# Patient Record
Sex: MALE | Race: BLACK OR AFRICAN AMERICAN | NOT HISPANIC OR LATINO | Employment: OTHER | URBAN - METROPOLITAN AREA
[De-identification: names, ages, dates, MRNs, and addresses within clinical notes are randomized per-mention and may not be internally consistent; named-entity substitution may affect disease eponyms.]

---

## 2017-08-22 ENCOUNTER — LAB VISIT (OUTPATIENT)
Dept: LAB | Facility: HOSPITAL | Age: 52
End: 2017-08-22
Attending: INTERNAL MEDICINE
Payer: MEDICARE

## 2017-08-22 ENCOUNTER — OFFICE VISIT (OUTPATIENT)
Dept: NEPHROLOGY | Facility: CLINIC | Age: 52
End: 2017-08-22
Payer: MEDICARE

## 2017-08-22 VITALS
WEIGHT: 315 LBS | SYSTOLIC BLOOD PRESSURE: 158 MMHG | DIASTOLIC BLOOD PRESSURE: 110 MMHG | HEIGHT: 74 IN | BODY MASS INDEX: 40.43 KG/M2 | OXYGEN SATURATION: 98 % | HEART RATE: 79 BPM

## 2017-08-22 DIAGNOSIS — I15.9 SECONDARY HYPERTENSION: ICD-10-CM

## 2017-08-22 DIAGNOSIS — N18.30 CHRONIC KIDNEY DISEASE, STAGE III (MODERATE): Primary | ICD-10-CM

## 2017-08-22 DIAGNOSIS — N19 RENAL FAILURE, UNSPECIFIED CHRONICITY: ICD-10-CM

## 2017-08-22 DIAGNOSIS — R73.09 BLOOD GLUCOSE ABNORMAL: ICD-10-CM

## 2017-08-22 DIAGNOSIS — I10 HTN (HYPERTENSION), MALIGNANT: ICD-10-CM

## 2017-08-22 DIAGNOSIS — N18.30 CHRONIC KIDNEY DISEASE, STAGE III (MODERATE): ICD-10-CM

## 2017-08-22 DIAGNOSIS — Z13.1 SCREENING FOR DIABETES MELLITUS: ICD-10-CM

## 2017-08-22 LAB
25(OH)D3+25(OH)D2 SERPL-MCNC: 35 NG/ML
ALBUMIN SERPL BCP-MCNC: 3.5 G/DL
ALP SERPL-CCNC: 67 U/L
ALT SERPL W/O P-5'-P-CCNC: 23 U/L
ANION GAP SERPL CALC-SCNC: 8 MMOL/L
AST SERPL-CCNC: 26 U/L
BASOPHILS # BLD AUTO: 0.02 K/UL
BASOPHILS NFR BLD: 0.3 %
BILIRUB SERPL-MCNC: 0.4 MG/DL
BUN SERPL-MCNC: 26 MG/DL
CALCIUM SERPL-MCNC: 9.6 MG/DL
CHLORIDE SERPL-SCNC: 103 MMOL/L
CO2 SERPL-SCNC: 33 MMOL/L
CREAT SERPL-MCNC: 2.4 MG/DL
DIFFERENTIAL METHOD: ABNORMAL
EOSINOPHIL # BLD AUTO: 0.4 K/UL
EOSINOPHIL NFR BLD: 5.4 %
ERYTHROCYTE [DISTWIDTH] IN BLOOD BY AUTOMATED COUNT: 13.2 %
EST. GFR  (AFRICAN AMERICAN): 35.3 ML/MIN/1.73 M^2
EST. GFR  (NON AFRICAN AMERICAN): 30.5 ML/MIN/1.73 M^2
FERRITIN SERPL-MCNC: 249 NG/ML
GLUCOSE SERPL-MCNC: 107 MG/DL
HCT VFR BLD AUTO: 41.9 %
HGB BLD-MCNC: 14.3 G/DL
IRON SERPL-MCNC: 79 UG/DL
LYMPHOCYTES # BLD AUTO: 3.4 K/UL
LYMPHOCYTES NFR BLD: 50.3 %
MAGNESIUM SERPL-MCNC: 1.9 MG/DL
MCH RBC QN AUTO: 32.9 PG
MCHC RBC AUTO-ENTMCNC: 34.1 G/DL
MCV RBC AUTO: 97 FL
MONOCYTES # BLD AUTO: 0.6 K/UL
MONOCYTES NFR BLD: 9 %
NEUTROPHILS # BLD AUTO: 2.3 K/UL
NEUTROPHILS NFR BLD: 34.9 %
PHOSPHATE SERPL-MCNC: 3.1 MG/DL
PLATELET # BLD AUTO: 218 K/UL
PMV BLD AUTO: 10.6 FL
POTASSIUM SERPL-SCNC: 3.8 MMOL/L
PROT SERPL-MCNC: 7.4 G/DL
PTH-INTACT SERPL-MCNC: 78 PG/ML
RBC # BLD AUTO: 4.34 M/UL
SATURATED IRON: 21 %
SODIUM SERPL-SCNC: 144 MMOL/L
TOTAL IRON BINDING CAPACITY: 380 UG/DL
TRANSFERRIN SERPL-MCNC: 257 MG/DL
URATE SERPL-MCNC: 7.7 MG/DL
WBC # BLD AUTO: 6.7 K/UL

## 2017-08-22 PROCEDURE — 82306 VITAMIN D 25 HYDROXY: CPT

## 2017-08-22 PROCEDURE — 84550 ASSAY OF BLOOD/URIC ACID: CPT

## 2017-08-22 PROCEDURE — 83735 ASSAY OF MAGNESIUM: CPT

## 2017-08-22 PROCEDURE — 80053 COMPREHEN METABOLIC PANEL: CPT

## 2017-08-22 PROCEDURE — 85025 COMPLETE CBC W/AUTO DIFF WBC: CPT

## 2017-08-22 PROCEDURE — 83540 ASSAY OF IRON: CPT

## 2017-08-22 PROCEDURE — 36415 COLL VENOUS BLD VENIPUNCTURE: CPT

## 2017-08-22 PROCEDURE — 82728 ASSAY OF FERRITIN: CPT

## 2017-08-22 PROCEDURE — 83970 ASSAY OF PARATHORMONE: CPT

## 2017-08-22 PROCEDURE — 82088 ASSAY OF ALDOSTERONE: CPT

## 2017-08-22 PROCEDURE — 99999 PR PBB SHADOW E&M-NEW PATIENT-LVL III: CPT | Mod: PBBFAC,,, | Performed by: INTERNAL MEDICINE

## 2017-08-22 PROCEDURE — 84466 ASSAY OF TRANSFERRIN: CPT

## 2017-08-22 PROCEDURE — 99204 OFFICE O/P NEW MOD 45 MIN: CPT | Mod: S$PBB,,, | Performed by: INTERNAL MEDICINE

## 2017-08-22 PROCEDURE — 83036 HEMOGLOBIN GLYCOSYLATED A1C: CPT

## 2017-08-22 PROCEDURE — 84100 ASSAY OF PHOSPHORUS: CPT

## 2017-08-22 RX ORDER — HYDRALAZINE HYDROCHLORIDE 50 MG/1
50 TABLET, FILM COATED ORAL 3 TIMES DAILY
COMMUNITY
End: 2018-02-19 | Stop reason: SDUPTHER

## 2017-08-22 RX ORDER — HYDROCHLOROTHIAZIDE 25 MG/1
25 TABLET ORAL DAILY
COMMUNITY
End: 2017-10-18

## 2017-08-22 RX ORDER — AMLODIPINE BESYLATE 10 MG/1
10 TABLET ORAL DAILY
COMMUNITY
End: 2017-09-12 | Stop reason: SDUPTHER

## 2017-08-22 RX ORDER — OLMESARTAN MEDOXOMIL 40 MG/1
40 TABLET ORAL DAILY
COMMUNITY
End: 2017-10-16 | Stop reason: SDUPTHER

## 2017-08-22 NOTE — PROGRESS NOTES
Subjective:       Patient ID: Fausto Pandey is a 51 y.o. Black or  male who presents for new evaluation of CKD    HPI   52 y/o M with HTN who presents for new evaluation of CKD. No labs to review, but per patient, he went to ED last week c/o left leg pain and workup revealed a GFR of 33.  He does not exercise much. He denies any chest pain, shortness of breath on mild to moderate activity. His appetite is good and denies any nausea, vomiting, diarrhea, abdominal pain, melena or bright red bleeding per rectum. His bowel movements are regular and his weight is stable. He urinates regularly and denies any frequency, urgency or hematuria. He does not check his BP.       Past Medical History:   Diagnosis Date    Hypertension        Review of Systems    Constitutional: Negative for fatigue.   Eyes: Negative for discharge.   Respiratory: Negative for cough, shortness of breath and wheezing.   Cardiovascular: Negative for chest pain and palpitations.   Gastrointestinal: Negative for nausea, vomiting, abdominal pain and diarrhea.   Genitourinary: Negative for dysuria, urgency, frequency and hematuria.   Skin: Negative for color change and rash.   Psychiatric/Behavioral: Negative for confusion.    Objective:      Physical Exam    Gen: AAOx3, NAD  HEENT: mmm  Neck: no bruit, no JVD  CV: RRR, no m/r  Resp: CTAx2, normal effort  GI: soft, ND, NTTP, +BS  Extr: no LE edema  Neuro: normal reflexes, no focal deficits    Assessment:       1. Chronic kidney disease, stage III (moderate)    2. Secondary hypertension    3. Renal failure, unspecified chronicity    4. Screening for diabetes mellitus    5. Blood glucose abnormal    6. HTN (hypertension), malignant        Plan:         1. CKD: per patient, GFR 33 on labs done in the ER (not available to review). Will get labs, renal US with doppler.     No results found for: CREATININE  Protein Creatinine Ratios: check UPC    No results found for: UTPCR    Acid-Base: No  results found for: NA, K, CO2 checking labs    2. HTN: Blood pressures not controlled on current meds. Will check renin, dylon, renal US with doppler. Increased hydralazine to 50mg TID    3. Renal osteodystrophy: last PTH No results found for: PTH, CALCIUM, CAION, PHOS, will check PTH, vit D    4. Anemia: will check cbc    Lab Results   Component Value Date    HGB 14.3 08/22/2017        5. DM:  Last HbA1C No results found for: HGBA1C    6. Lipid management: will check lipid panel  No results found for: LDLCALC      Follow up in 2 weeks

## 2017-08-23 LAB
ESTIMATED AVG GLUCOSE: 91 MG/DL
HBA1C MFR BLD HPLC: 4.8 %

## 2017-08-24 LAB — ALDOST SERPL-MCNC: 11.6 NG/DL

## 2017-08-25 ENCOUNTER — HOSPITAL ENCOUNTER (OUTPATIENT)
Dept: RADIOLOGY | Facility: HOSPITAL | Age: 52
Discharge: HOME | End: 2017-08-25
Attending: INTERNAL MEDICINE
Payer: MEDICARE

## 2017-08-25 DIAGNOSIS — N19 RENAL FAILURE, UNSPECIFIED CHRONICITY: ICD-10-CM

## 2017-08-25 DIAGNOSIS — N18.30 CHRONIC KIDNEY DISEASE, STAGE III (MODERATE): ICD-10-CM

## 2017-08-25 DIAGNOSIS — I15.9 SECONDARY HYPERTENSION: ICD-10-CM

## 2017-08-25 LAB — RENIN PLAS-CCNC: 3.9 NG/ML/H

## 2017-08-25 PROCEDURE — 76770 US EXAM ABDO BACK WALL COMP: CPT | Mod: 26,59,GC, | Performed by: RADIOLOGY

## 2017-08-25 PROCEDURE — 76770 US EXAM ABDO BACK WALL COMP: CPT | Mod: TC

## 2017-08-25 PROCEDURE — 93975 VASCULAR STUDY: CPT | Mod: 26,GC,, | Performed by: RADIOLOGY

## 2017-08-29 ENCOUNTER — HOSPITAL ENCOUNTER (OUTPATIENT)
Dept: RADIOLOGY | Facility: HOSPITAL | Age: 52
Discharge: HOME | End: 2017-08-29
Attending: NURSE PRACTITIONER
Payer: MEDICARE

## 2017-08-29 ENCOUNTER — TELEPHONE (OUTPATIENT)
Dept: ORTHOPEDICS | Facility: CLINIC | Age: 52
End: 2017-08-29

## 2017-08-29 ENCOUNTER — OFFICE VISIT (OUTPATIENT)
Dept: ORTHOPEDICS | Facility: CLINIC | Age: 52
End: 2017-08-29
Payer: MEDICARE

## 2017-08-29 VITALS — BODY MASS INDEX: 40.43 KG/M2 | WEIGHT: 315 LBS | HEIGHT: 74 IN

## 2017-08-29 DIAGNOSIS — M25.562 ACUTE PAIN OF LEFT KNEE: Primary | ICD-10-CM

## 2017-08-29 DIAGNOSIS — M25.562 ACUTE BILATERAL KNEE PAIN: ICD-10-CM

## 2017-08-29 DIAGNOSIS — M25.561 ACUTE BILATERAL KNEE PAIN: ICD-10-CM

## 2017-08-29 DIAGNOSIS — M25.562 ACUTE BILATERAL KNEE PAIN: Primary | ICD-10-CM

## 2017-08-29 DIAGNOSIS — M25.561 ACUTE BILATERAL KNEE PAIN: Primary | ICD-10-CM

## 2017-08-29 PROCEDURE — 99203 OFFICE O/P NEW LOW 30 MIN: CPT | Mod: 25,S$PBB,, | Performed by: NURSE PRACTITIONER

## 2017-08-29 PROCEDURE — 73564 X-RAY EXAM KNEE 4 OR MORE: CPT | Mod: 26,50,, | Performed by: RADIOLOGY

## 2017-08-29 PROCEDURE — 99213 OFFICE O/P EST LOW 20 MIN: CPT | Mod: PBBFAC,25,PO | Performed by: NURSE PRACTITIONER

## 2017-08-29 PROCEDURE — 99999 PR PBB SHADOW E&M-EST. PATIENT-LVL III: CPT | Mod: PBBFAC,,, | Performed by: NURSE PRACTITIONER

## 2017-08-29 PROCEDURE — 73564 X-RAY EXAM KNEE 4 OR MORE: CPT | Mod: TC,50

## 2017-08-29 PROCEDURE — 20610 DRAIN/INJ JOINT/BURSA W/O US: CPT | Mod: S$PBB,LT,, | Performed by: NURSE PRACTITIONER

## 2017-08-29 PROCEDURE — 20610 DRAIN/INJ JOINT/BURSA W/O US: CPT | Mod: PBBFAC,PO | Performed by: NURSE PRACTITIONER

## 2017-08-29 RX ORDER — TRIAMCINOLONE ACETONIDE 40 MG/ML
40 INJECTION, SUSPENSION INTRA-ARTICULAR; INTRAMUSCULAR
Status: COMPLETED | OUTPATIENT
Start: 2017-08-29 | End: 2017-08-29

## 2017-08-29 RX ADMIN — TRIAMCINOLONE ACETONIDE 40 MG: 40 INJECTION, SUSPENSION INTRA-ARTICULAR; INTRAMUSCULAR at 07:08

## 2017-08-30 NOTE — PROGRESS NOTES
CC: Pain and Swelling of the Left Knee      HPI: Pt with left knee pain for the past month. The pain started spontaneously and is aching. It is global with radiation to the lateral thigh. He had some associated muscle spasms, but those have resolved with a muscle relaxer. He is unable to take nsaids due to creatinine 2.4. He is currently being worked up by nephrology. 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 There is no catching or locking. He is ambulating independently. There is not a limp.    ROS  General: denies fever and chills  Resp: no c/o sob  CVS: no c/o cp  MSK: c/o left knee pain and swelling worse with activity    PE  General: AAOx3, pleasant and cooperative  Resp: respirations even and unlabored  MSK: left knee exam  0 degrees extension  120 degrees flexion  No warmth or erythema   - effusion    Xray:  Ordered and reviewed by me: joint spaces well preserved with no fracture or dislocation noted    Assessment:  Left knee pain    Plan:  Pain described consistent with arthritis, but xray is normal. Due to patients age and spontaneous onset of pain, will obtain an MRI for further evaluation  Cortisone injection left knee today for pain  RICE  F/u results of MRI by phone    Knee Injection Procedure Note    Pre-operative Diagnosis: left knee paint    Post-operative Diagnosis: same    Indications: left knee pain    Anesthesia: none    Procedure Details     Verbal consent was obtained for the procedure. The injection site was identified and the skin was prepared with alcohol. The left knee was injected from an anterolateral approach with 1 ml of Kenalog and 5 ml Lidocaine under sterile technique using a 22 gauge needle. The needle was removed and the area cleansed and dressed.    Complications:  None; patient tolerated the procedure well.    he was advised to rest the knee today, using ice and elevation as needed for comfort and swelling. he did  receive immediate relief of the knee pain. he was told this would be short lived and is secondary to the lidocaine. he may have an increase in discomfort tonight followed by steady improvement over the next several days. It may take 1-3 weeks following the injection to get the full benefit of the medication.

## 2017-08-31 ENCOUNTER — PATIENT MESSAGE (OUTPATIENT)
Dept: NEPHROLOGY | Facility: CLINIC | Age: 52
End: 2017-08-31

## 2017-09-08 ENCOUNTER — HOSPITAL ENCOUNTER (EMERGENCY)
Facility: OTHER | Age: 52
Discharge: HOME | End: 2017-09-08
Attending: EMERGENCY MEDICINE | Admitting: EMERGENCY MEDICINE
Payer: MEDICARE

## 2017-09-08 VITALS
TEMPERATURE: 98 F | WEIGHT: 315 LBS | OXYGEN SATURATION: 98 % | HEART RATE: 75 BPM | DIASTOLIC BLOOD PRESSURE: 111 MMHG | RESPIRATION RATE: 16 BRPM | BODY MASS INDEX: 40.43 KG/M2 | SYSTOLIC BLOOD PRESSURE: 195 MMHG | HEIGHT: 74 IN

## 2017-09-08 DIAGNOSIS — I10 HYPERTENSION, UNCONTROLLED: ICD-10-CM

## 2017-09-08 DIAGNOSIS — M25.562 ACUTE PAIN OF LEFT KNEE: Primary | ICD-10-CM

## 2017-09-08 PROCEDURE — 25000003 PHARM REV CODE 250: Performed by: PHYSICIAN ASSISTANT

## 2017-09-08 PROCEDURE — 99283 EMERGENCY DEPT VISIT LOW MDM: CPT

## 2017-09-08 RX ORDER — OLMESARTAN MEDOXOMIL AND HYDROCHLOROTHIAZIDE 40/25 40; 25 MG/1; MG/1
1 TABLET ORAL DAILY
Qty: 30 TABLET | Refills: 0 | Status: SHIPPED | OUTPATIENT
Start: 2017-09-08 | End: 2017-09-12 | Stop reason: SDUPTHER

## 2017-09-08 RX ORDER — HYDROCODONE BITARTRATE AND ACETAMINOPHEN 7.5; 325 MG/1; MG/1
1 TABLET ORAL EVERY 4 HOURS PRN
Qty: 5 TABLET | Refills: 0 | Status: SHIPPED | OUTPATIENT
Start: 2017-09-08 | End: 2017-09-08

## 2017-09-08 RX ORDER — HYDROCODONE BITARTRATE AND ACETAMINOPHEN 5; 325 MG/1; MG/1
1 TABLET ORAL
Status: COMPLETED | OUTPATIENT
Start: 2017-09-08 | End: 2017-09-08

## 2017-09-08 RX ORDER — KETOROLAC TROMETHAMINE 30 MG/ML
30 INJECTION, SOLUTION INTRAMUSCULAR; INTRAVENOUS
Status: DISCONTINUED | OUTPATIENT
Start: 2017-09-08 | End: 2017-09-08

## 2017-09-08 RX ORDER — HYDROCODONE BITARTRATE AND ACETAMINOPHEN 7.5; 325 MG/1; MG/1
1 TABLET ORAL EVERY 4 HOURS PRN
Qty: 10 TABLET | Refills: 0 | Status: SHIPPED | OUTPATIENT
Start: 2017-09-08 | End: 2017-09-13 | Stop reason: SDUPTHER

## 2017-09-08 RX ORDER — METHOCARBAMOL 500 MG/1
1000 TABLET, FILM COATED ORAL
Status: COMPLETED | OUTPATIENT
Start: 2017-09-08 | End: 2017-09-08

## 2017-09-08 RX ORDER — OLMESARTAN MEDOXOMIL AND HYDROCHLOROTHIAZIDE 40/25 40; 25 MG/1; MG/1
1 TABLET ORAL DAILY
Qty: 30 TABLET | Refills: 0 | Status: SHIPPED | OUTPATIENT
Start: 2017-09-08 | End: 2017-09-08

## 2017-09-08 RX ADMIN — HYDROCODONE BITARTRATE AND ACETAMINOPHEN 1 TABLET: 5; 325 TABLET ORAL at 10:09

## 2017-09-08 RX ADMIN — METHOCARBAMOL 1000 MG: 500 TABLET ORAL at 10:09

## 2017-09-09 NOTE — ED PROVIDER NOTES
Encounter Date: 9/8/2017       History     Chief Complaint   Patient presents with    Knee Pain     Patient stated that he received a steroid shot to his left knee 2 weeks ago and has been having pain ever since.       Patient is a 50 y/o male with hypertension who presents to the ED with right knee pain. He states this pain began after he received a steroid shot to the right knee on Aug 29. He states he received the shot for his arthritis but denies experiencing any knee pain prior to the injection. He did not get back in contact with the Orthopedist to let her know he is experiencing pain. Upon reviewing these medical records, she did not feel comfortable giving him NSAIDs due to elevated creatinine.    The history is provided by the patient.     Review of patient's allergies indicates:  No Known Allergies  Past Medical History:   Diagnosis Date    Hypertension      History reviewed. No pertinent surgical history.  History reviewed. No pertinent family history.  Social History   Substance Use Topics    Smoking status: Current Every Day Smoker     Packs/day: 0.50     Types: Cigarettes    Smokeless tobacco: Never Used    Alcohol use Yes     Review of Systems   Constitutional: Negative for chills and fever.   HENT: Negative for congestion and sore throat.    Eyes: Negative for pain.   Respiratory: Negative for shortness of breath.    Cardiovascular: Negative for chest pain.   Gastrointestinal: Negative for abdominal pain, diarrhea, nausea and vomiting.   Genitourinary: Negative for dysuria.   Musculoskeletal: Positive for arthralgias and gait problem. Negative for back pain and joint swelling.   Skin: Negative for rash.   Neurological: Negative for headaches.       Physical Exam     Initial Vitals [09/08/17 2143]   BP Pulse Resp Temp SpO2   (!) 217/110 85 16 98.2 °F (36.8 °C) 97 %      MAP       145.67         Vitals:    09/08/17 2143 09/08/17 2256   BP: (!) 217/110 (!) 195/111   Pulse: 85 75   Resp: 16 16  "  Temp: 98.2 °F (36.8 °C)    TempSrc: Oral    SpO2: 97% 98%   Weight: (!) 144.7 kg (319 lb)    Height: 6' 2" (1.88 m)        Physical Exam    Constitutional: He is cooperative.   Obese male in no acute distress accompanied in the ED with his wife. In no acute distress   HENT:   Head: Normocephalic and atraumatic.   Eyes: EOM are normal. Pupils are equal, round, and reactive to light.   Neck: Normal range of motion. Neck supple.   Cardiovascular: Normal rate, regular rhythm and intact distal pulses.   Pulmonary/Chest: Breath sounds normal. He has no wheezes.   Abdominal: Soft. Bowel sounds are normal. There is no tenderness.   Musculoskeletal: Normal range of motion. He exhibits no edema.   No patellar laxity, or effusion. Negative valgus/varus stress, negative anterior and posterior drawer. Pain to anterolateral aspect of left knee with extension of the leg.    Neurological: He is alert. No cranial nerve deficit. GCS eye subscore is 4. GCS verbal subscore is 5. GCS motor subscore is 6.   Skin: Skin is warm and dry. No rash noted.   Psychiatric: He has a normal mood and affect. His behavior is normal.         ED Course   Procedures  Labs Reviewed - No data to display          Medical Decision Making:   Initial Assessment:   Urgent evaluation of a 51 y.o. male with a medical history of HTN, presenting to the emergency department complaining of knee pain. Patient is afebrile, nontoxic appearing and hemodynamically stable.  ED Management:  Given patients symptoms, physical exam, and recent imaging, I do not believe any imaging is warranted at this time. Will give pain meds and recheck blood pressure. Patient's pain decreased on Norco and BP dropped to 195/111. Patient is asymptomatic and there is no evidence of hypertensive urgency/emergency. Will refill Benicar and provide short course of pain meds.   Patient understands he needs to follow up with orthopedist  I have discussed the patient with the attending thoroughly " and he/she agrees to the treatment and plan  Other:   I have discussed this case with another health care provider.       <> Summary of the Discussion: Dr. Coreas              Attending Attestation:     Physician Attestation Statement for NP/PA:   I have conducted a face to face encounter with this patient in addition to the NP/PA, due to NP/PA Request    Other NP/PA Attestation Additions:    History of Present Illness: 51-year-old male presents with complaint of left knee pain since receiving steroid injection a few weeks ago.  There is been no new trauma.   Physical Exam: Left knee has no erythema warmth or induration.  There is full range of motion.  Joint is stable.   Medical Decision Making: No evidence of septic joint.  Although patient reports there was no pain prior to the injection, the injection was given for pain.  Unclear if this has worsened, but no evidence of any emergent issue, and no trauma to necessitate emergent imaging.  He is treated with analgesics with improvement, encouraged close follow-up with orthopedics.  Additionally, he did have significant elevation of blood pressure and admits being out of medication.  We did refill antihypertensives, and review recent labs with borderline kidney insufficiency.  He is advised to follow-up with PCP.  He is asymptomatic from this and it did improve with pain control.  I do not think this necessitates emergent workup at this time.                 ED Course      Clinical Impression:   The encounter diagnosis was Acute pain of left knee.  Hypertension, uncontrolled.                           Salbador Hurst PA-C  09/08/17 9328       Felicia Coreas MD  09/10/17 0260

## 2017-09-09 NOTE — ED TRIAGE NOTES
"+ left knee pain after "they put that shot in my knee on the 29th of August. Since then, I have to bend my knee all the time or it hurts". Denies numbness/tingling to affected extremity. No fever or chills reported.  "

## 2017-09-09 NOTE — ED NOTES
Two patient identifiers have been checked and are correct.      Appearance: Pt awake, alert & oriented to person, place & time. Pt in no acute distress at present time. Pt is clean and well groomed with clothes appropriately fastened.   Skin: Skin warm, dry & intact. Color consistent with ethnicity. Mucous membranes moist. No breakdown or brusing noted.   Musculoskeletal: Patient moving all extremities well, no obvious swelling or deformities noted. + intermittent increased left sided knee pain rated 5/10 on pain scale, pt reports + increased pain upon extension, denies numbness/tingling to site. No swelling or bruising noted.   Respiratory: Respirations spontaneous, even, and non-labored. Visible chest rise noted. Airway is open and patent. No accessory muscle use noted.   Neurologic: Sensation is intact. Speech is clear and appropriate. Eyes open spontaneously, behavior appropriate to situation, follows commands, facial expression symmetrical, bilateral hand grasp equal and even, purposeful motor response noted.  Cardiac: All peripheral pulses present. No Bilateral lower extremity edema. Cap refill is <3 seconds.

## 2017-09-11 ENCOUNTER — TELEPHONE (OUTPATIENT)
Dept: ORTHOPEDICS | Facility: CLINIC | Age: 52
End: 2017-09-11

## 2017-09-11 NOTE — TELEPHONE ENCOUNTER
----- Message from Jess Delaney sent at 9/11/2017  9:44 AM CDT -----  Contact: self/home  Pt went to the ED for lt knee pain and was told by the ED physician that a nerve was hit at the time of his injection. Pt would like to speak with Rubi regarding another appt.

## 2017-09-11 NOTE — TELEPHONE ENCOUNTER
Spoke to pt and he states he received cortisone inj on 8/29 with no relief. He went to ED on 9/8 and is wanting a follow up appt with Rubi. Pt was scheduled 9/13 at 145p. Pt verbalized understanding.

## 2017-09-12 ENCOUNTER — OFFICE VISIT (OUTPATIENT)
Dept: NEPHROLOGY | Facility: CLINIC | Age: 52
End: 2017-09-12
Payer: MEDICARE

## 2017-09-12 VITALS
HEART RATE: 88 BPM | SYSTOLIC BLOOD PRESSURE: 150 MMHG | BODY MASS INDEX: 40.43 KG/M2 | DIASTOLIC BLOOD PRESSURE: 98 MMHG | WEIGHT: 315 LBS | OXYGEN SATURATION: 95 % | HEIGHT: 74 IN

## 2017-09-12 DIAGNOSIS — N18.30 CHRONIC KIDNEY DISEASE, STAGE III (MODERATE): Primary | ICD-10-CM

## 2017-09-12 PROCEDURE — 99213 OFFICE O/P EST LOW 20 MIN: CPT | Mod: S$PBB,,, | Performed by: INTERNAL MEDICINE

## 2017-09-12 PROCEDURE — 99999 PR PBB SHADOW E&M-EST. PATIENT-LVL III: CPT | Mod: PBBFAC,,, | Performed by: INTERNAL MEDICINE

## 2017-09-12 PROCEDURE — 99213 OFFICE O/P EST LOW 20 MIN: CPT | Mod: PBBFAC | Performed by: INTERNAL MEDICINE

## 2017-09-12 RX ORDER — AMLODIPINE BESYLATE 10 MG/1
10 TABLET ORAL DAILY
Qty: 30 TABLET | Refills: 6 | Status: SHIPPED | OUTPATIENT
Start: 2017-09-12 | End: 2018-09-14 | Stop reason: SDUPTHER

## 2017-09-12 RX ORDER — OLMESARTAN MEDOXOMIL AND HYDROCHLOROTHIAZIDE 40/25 40; 25 MG/1; MG/1
1 TABLET ORAL DAILY
Qty: 30 TABLET | Refills: 6 | Status: SHIPPED | OUTPATIENT
Start: 2017-09-12 | End: 2017-10-12 | Stop reason: SDUPTHER

## 2017-09-12 RX ORDER — SPIRONOLACTONE 25 MG/1
25 TABLET ORAL DAILY
Qty: 30 TABLET | Refills: 6 | Status: SHIPPED | OUTPATIENT
Start: 2017-09-12 | End: 2018-08-16 | Stop reason: SDUPTHER

## 2017-09-12 NOTE — PROGRESS NOTES
Subjective:       Patient ID: Fausto Pandey is a 51 y.o. Black or  male who presents for f/u evaluation of CKD    HPI -initial   52 y/o M with HTN who presents for new evaluation of CKD. No labs to review, but per patient, he went to ED last week c/o left leg pain and workup revealed a GFR of 33.  He does not exercise much. He denies any chest pain, shortness of breath on mild to moderate activity. His appetite is good and denies any nausea, vomiting, diarrhea, abdominal pain, melena or bright red bleeding per rectum. His bowel movements are regular and his weight is stable. He urinates regularly and denies any frequency, urgency or hematuria. He does not check his BP.     Interval Hx:  Creatinine 2.4, GFR 35. UPC ratio 0.2. Patient continues to complain of left leg pain, saw ortho who gave steroid injection a couple of weeks ago with no significant improvement. Now c/o of left knee pain. Have f/u appointment tomorrow. His BP is not controlled and reports running out of meds recently. Renal US doppler was limited 2/2 patient body habitus. Aldost/renin ratio normal.       Past Medical History:   Diagnosis Date    Hypertension        Review of Systems    Constitutional: Negative for fatigue.   Eyes: Negative for discharge.   Respiratory: Negative for cough, shortness of breath and wheezing.   Cardiovascular: Negative for chest pain and palpitations.   Gastrointestinal: Negative for nausea, vomiting, abdominal pain and diarrhea.   Genitourinary: Negative for dysuria, urgency, frequency and hematuria.   Skin: Negative for color change and rash.   Psychiatric/Behavioral: Negative for confusion.    Objective:      Physical Exam    Gen: AAOx3, NAD  HEENT: mmm  Neck: no bruit, no JVD  CV: RRR, no m/r  Resp: CTAx2, normal effort  GI: soft, ND, NTTP, +BS  Extr: no LE edema  Neuro: normal reflexes, no focal deficits    Assessment:       1. Chronic kidney disease, stage III (moderate)        Plan:         1. CKD  III: likely secondary to uncontrolled HTN. Creatinine 2.4. Renal US doppler limited 2/2 body habitus.        Lab Results   Component Value Date    CREATININE 2.4 (H) 08/22/2017     Protein Creatinine Ratios: will trend    Prot/Creat Ratio, Ur   Date Value Ref Range Status   08/22/2017 0.22 (H) 0.00 - 0.20 Final       Acid-Base: metabolic alkalosis, possibly 2/2 chronic CO2 retention. Will monitor  Lab Results   Component Value Date     08/22/2017    K 3.8 08/22/2017    CO2 33 (H) 08/22/2017    checking labs    2. HTN: Blood pressures not controlled on current meds. Chalo/renin normal in this obese man on ACEi, etc. Renal US with doppler limited but no obvious evidence of CARI. On Benicar/HCTZ/Amlodipine/Hydralazine. Will start aldactone 25mg Daily. Home BP monitoring.     3. Renal osteodystrophy: last PTH, will monitor  Lab Results   Component Value Date    PTH 78.0 (H) 08/22/2017    CALCIUM 9.6 08/22/2017    PHOS 3.1 08/22/2017       4. Anemia: will check cbc    Lab Results   Component Value Date    HGB 14.3 08/22/2017        6. Lipid management: will check lipid panel  No results found for: LDLCALC      Follow up in 3 months with labs

## 2017-09-13 ENCOUNTER — OFFICE VISIT (OUTPATIENT)
Dept: ORTHOPEDICS | Facility: CLINIC | Age: 52
End: 2017-09-13
Payer: MEDICARE

## 2017-09-13 VITALS — HEIGHT: 74 IN | WEIGHT: 315 LBS | BODY MASS INDEX: 40.43 KG/M2

## 2017-09-13 DIAGNOSIS — M25.569 KNEE PAIN, UNSPECIFIED CHRONICITY, UNSPECIFIED LATERALITY: Primary | ICD-10-CM

## 2017-09-13 PROCEDURE — 99213 OFFICE O/P EST LOW 20 MIN: CPT | Mod: PBBFAC | Performed by: NURSE PRACTITIONER

## 2017-09-13 PROCEDURE — 99999 PR PBB SHADOW E&M-EST. PATIENT-LVL III: CPT | Mod: PBBFAC,,, | Performed by: NURSE PRACTITIONER

## 2017-09-13 PROCEDURE — 99213 OFFICE O/P EST LOW 20 MIN: CPT | Mod: S$PBB,,, | Performed by: NURSE PRACTITIONER

## 2017-09-13 RX ORDER — HYDROCODONE BITARTRATE AND ACETAMINOPHEN 7.5; 325 MG/1; MG/1
1 TABLET ORAL EVERY 6 HOURS PRN
Qty: 30 TABLET | Refills: 0 | Status: SHIPPED | OUTPATIENT
Start: 2017-09-13 | End: 2018-09-14

## 2017-09-16 ENCOUNTER — HOSPITAL ENCOUNTER (OUTPATIENT)
Dept: RADIOLOGY | Facility: OTHER | Age: 52
Discharge: HOME | End: 2017-09-16
Attending: NURSE PRACTITIONER
Payer: MEDICARE

## 2017-09-16 DIAGNOSIS — M25.562 ACUTE PAIN OF LEFT KNEE: ICD-10-CM

## 2017-09-16 PROCEDURE — 73721 MRI JNT OF LWR EXTRE W/O DYE: CPT | Mod: TC,LT

## 2017-09-16 PROCEDURE — 73721 MRI JNT OF LWR EXTRE W/O DYE: CPT | Mod: 26,LT,, | Performed by: RADIOLOGY

## 2017-09-16 NOTE — PROGRESS NOTES
CC: Pain of the Left Knee      HPI: Pt with left knee pain which he reports starting after he had a cortisone injection in the after hours clinic. He presented to clinic 8/29/17 with c/o knee swelling and pain which radiated to the upper thigh. At that time he reported that the pain in the thigh was relieved with a muscle relaxer, but that the knee was still bothering him. 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 He received the injection and now reports that he has had pain in the knee since the injection. He now reports that he wasn't having knee pain when he was seen in the clinic, but only thigh pain. I asked him why he didn't decline the xray and the injection in the knee at the time when he was seen, if he wasn't having knee pain, but he says he doesn't know. He says it feels like the needle is still in his knee. He did not call back to c/o knee pain after the injection, but he reports he cancelled the MRI because he was in too much pain to get it done. On 9/8/17 he reported to the ED with c/o pain in the knee. Evaluation was negative and he was given 10 Norco pills which he reports completely relieved his pain. He requests more Norco pills for the pain today. I explained that the MRI is very important in further evaluating the knee for a cause of his pain. He ambulated to the appointment today without assistive device. There is not a limp. He is accompanied by his wife.    ROS  General: denies fever and chills  Resp: no c/o sob  CVS: no c/o cp  MSK: c/o left knee pain which feels like the needle is still in the knee    PE  General: AAOx3, pleasant and cooperative  Resp: respirations even and unlabored  MSK: left knee exam  0 degrees extension  120 degrees  flexion  No warmth or erythema   - effusion    Assessment:  Left knee pain    Plan:  Patient has agreed to proceed with the MRI for further evaluation  I explained that I can fill a prescription for 7 days of norco only   I will f/u his results by phone

## 2017-09-18 ENCOUNTER — TELEPHONE (OUTPATIENT)
Dept: ORTHOPEDICS | Facility: CLINIC | Age: 52
End: 2017-09-18

## 2017-09-18 NOTE — TELEPHONE ENCOUNTER
Called patient with MRI results. Discussed with Dr. Contreras and he suggested that patient see Dr. Dhaliwal for further evaluation of the synovial cyst. Pt reports that the pain is better. Will schedule an appt with Dr. Dhaliwal for f/u as suggested by Dr. Contreras.

## 2017-10-06 ENCOUNTER — TELEPHONE (OUTPATIENT)
Dept: ORTHOPEDICS | Facility: CLINIC | Age: 52
End: 2017-10-06

## 2017-10-06 NOTE — TELEPHONE ENCOUNTER
Left message informing pt that he is unable to get a refill on pain medication. He was only given the medication to hold him over til he sees Dr. Dhaliwal in sports medicine which is Monday, 10/9. Name and number provided for any questions or concerns.

## 2017-10-09 ENCOUNTER — OFFICE VISIT (OUTPATIENT)
Dept: SPORTS MEDICINE | Facility: CLINIC | Age: 52
End: 2017-10-09
Payer: MEDICARE

## 2017-10-09 VITALS
WEIGHT: 315 LBS | SYSTOLIC BLOOD PRESSURE: 144 MMHG | DIASTOLIC BLOOD PRESSURE: 90 MMHG | BODY MASS INDEX: 40.43 KG/M2 | HEART RATE: 110 BPM | HEIGHT: 74 IN

## 2017-10-09 DIAGNOSIS — M23.92 INTERNAL DERANGEMENT OF LEFT KNEE: ICD-10-CM

## 2017-10-09 DIAGNOSIS — M25.562 ACUTE PAIN OF LEFT KNEE: Primary | ICD-10-CM

## 2017-10-09 DIAGNOSIS — M25.562 ARTHRALGIA OF LEFT LOWER LEG: ICD-10-CM

## 2017-10-09 PROCEDURE — 99214 OFFICE O/P EST MOD 30 MIN: CPT | Mod: PBBFAC,PO | Performed by: ORTHOPAEDIC SURGERY

## 2017-10-09 PROCEDURE — 99204 OFFICE O/P NEW MOD 45 MIN: CPT | Mod: S$PBB,,, | Performed by: ORTHOPAEDIC SURGERY

## 2017-10-09 PROCEDURE — 99999 PR PBB SHADOW E&M-EST. PATIENT-LVL IV: CPT | Mod: PBBFAC,,, | Performed by: ORTHOPAEDIC SURGERY

## 2017-10-09 NOTE — PATIENT INSTRUCTIONS
What is Cosamin® ASU?    Cosamin ASU is an advanced proprietary formulation that combines [id number]® avocado/soybean unsaponifiables (ASU) with Cosamin's FCHG49® glucosamine and pharmaceutical-grade OUX159® sodium chondroitin sulfate.    For over a decade, Cosamin DS has served as the premium joint health supplement on the market. By combining the exclusive ingredients found in Cosamin DS with ASU, [employer], [employer] has made the best even better.    Cosamin ASU is a dual synergistic formula: its specific combination of glucosamine and sodium chondroitin sulfate have demonstrated synergy in stimulating cartilage production,1 while ASU also acts synergistically with glucosamine.  What is ASU and how does it work?    ASU (avocado/soybean unsaponifiables) is obtained from avocados and soybeans. A potent ingredient demonstrated to protect cartilage which leads to improved joint function, ASU complements the effects of the other ingredients. While working through their own primary mechanisms of action, ASU, glucosamine and chondroitin sulfate together deliver comprehensive joint health support. Our trademarked glucosamine hydrochloride, chondroitin sulfate, and avocado/soybean unsaponifiables together were shown in cell studies to be better than the combination of glucosamine and chondroitin sulfate at inhibiting expression of several agents involved in cartilage breakdown.    Cosamin ASU is available at leading pharmacies nationwide ([employer]) and online.    How do I take Cosamin® ASU?        Maximum Protection:      Take 4 capsules per day. Capsules may be taken all at once or divided with meals throughout the day.           Maintenance:      Once desired effect is noticed, you may gradually reduce the number of capsules to maintain comfort level.      Some individuals may respond sooner than others depending on the status of their cartilage and joint health. Once response has been seen, the number of  capsules per day may be decreased to maintain comfort level. Some individuals on this lower level may wish to go back to four capsules a day during the weekends or times of increased activity.      The maintenance level can also be used long-term to help maintain healthy joints. At any time, the number of capsules may be increased back to four capsules per day.      Where to Buy Cosamin® ASU?:    Retail Stores:        [employer]      [employer]      [facility] [facility] [facility]      [facility]      [employer]      [facility]      [facility]      [employer]      [facility] with [facility]      [employer]      William Reyes     Online Stores:        [employer].com      BJs.com      Costco.com      CVS.com      drugstore.com      iherb.com      Luckyvitamin.com      NutramaxStore.com      Valleynaturals.com      Vitacost.com      VitaminShoppe.com      [employer]com

## 2017-10-09 NOTE — LETTER
October 9, 2017      Aníbal Contreras MD  [address]  [facility] [address]           [facility]  [address]  [facility] [address]  Phone: [phone]          Patient: Fausto Pandey   MR Number: [id number]   YOB: 1965   Date of Visit: 10/9/2017       Dear Dr. Aníbal Contreras:    Thank you for referring Fausto Pandey to me for evaluation. Attached you will find relevant portions of my assessment and plan of care.    If you have questions, please do not hesitate to call me. I look forward to following Fausto Pandey along with you.    Sincerely,    Saranya Dhaliwal MD    Enclosure  CC:  No Recipients    If you would like to receive this communication electronically, please contact [email or web address] or [phone] to request more information on [facility] access.    For providers and/or their staff who would like to refer a patient to [facility], please contact us through our one-stop-shop provider referral line, [facility] , at [phone].    If you feel you have received this communication in error or would no longer like to receive these types of communications, please e-mail [email or web address]

## 2017-10-09 NOTE — PROGRESS NOTES
Subjective:          Chief Complaint: Fausto Pandey is a 52 y.o. male who had concerns including Pain of the Left Knee.    Patient reports LLE pain beginning ~ 8/19/17 without inciting incident. Was given a corticosteroid injection 8/29/17 in the left knee and has had pain since.  Pain has gotten worse. Patient currently on disability. Has taken Norco for the pain. Has not had Pt. Locates the pain on anterior and medial/lateral knee. Denies mechanical/instability symptoms.            Review of Systems   Constitution: Negative for fever and night sweats.   HENT: Negative for hearing loss.    Eyes: Negative for blurred vision and visual disturbance.   Cardiovascular: Negative for chest pain and leg swelling.   Respiratory: Negative for shortness of breath.    Endocrine: Negative for polyuria.   Hematologic/Lymphatic: Negative for bleeding problem.   Skin: Negative for rash.   Musculoskeletal: Positive for joint pain. Negative for back pain, joint swelling, muscle cramps and muscle weakness.   Gastrointestinal: Negative for melena.   Genitourinary: Negative for hematuria.   Neurological: Negative for loss of balance, numbness and paresthesias.   Psychiatric/Behavioral: Negative for altered mental status.       Pain Related Questions  Over the past 3 days, what was your average pain during activity? (I.e. running, jogging, walking, climbing stairs, getting dressed, ect.): 8  Over the past 3 days, what was your highest pain level?: 8  Over the past 3 days, what was your lowest pain level? : 0    Other  How many nights a week are you awakened by your affected body part?: 5  Was the patient's HEIGHT measured or patient reported?: Patient Reported  Was the patient's WEIGHT measured or patient reported?: Measured      Objective:        General: Fausto is well-developed, well-nourished, appears stated age, in no acute distress, alert and oriented to time, place and person.     General    Vitals  reviewed.  Constitutional: He is oriented to person, place, and time. He appears well-developed and well-nourished. No distress.   HENT:   Mouth/Throat: No oropharyngeal exudate.   Eyes: Right eye exhibits no discharge. Left eye exhibits no discharge.   Neck: Normal range of motion.   Pulmonary/Chest: Effort normal and breath sounds normal. No respiratory distress.   Neurological: He is alert and oriented to person, place, and time. He has normal reflexes. No cranial nerve deficit. Coordination normal.   Psychiatric: He has a normal mood and affect. His behavior is normal. Judgment and thought content normal.     General Musculoskeletal Exam   Gait: normal       Right Knee Exam     Inspection   Erythema: absent  Scars: absent  Swelling: absent  Effusion: effusion  Deformity: deformity  Bruising: absent    Tenderness   The patient is experiencing no tenderness.         Range of Motion   Extension: 0   Right knee flexion: 135.     Tests   Meniscus   Beatriz:  Medial - negative Lateral - negative  Ligament Examination Lachman: normal (-1 to 2mm) PCL-Posterior Drawer: normal (0 to 2mm)     MCL - Valgus: normal (0 to 2mm)  LCL - Varus: normalPivot Shift: normal (Equal)Reverse Pivot Shift: normal (Equal)Dial Test at 30 degrees: normal (< 5 degrees)Dial Test at 90 degrees: normal (< 5 degrees)  Posterior Sag Test: negative  Posterolateral Corner: unstable (>15 degrees difference)  Patella   Patellar Apprehension: negative  Passive Patellar Tilt: neutral  Patellar Tracking: normal  Patellar Glide (quadrants): Lateral - 1   Medial - 2  Q-Angle at 90 degrees: normal  Patellar Grind: negative  J-Sign: none    Other   Meniscal Cyst: absent  Popliteal (Baker's) Cyst: absent  Sensation: normal    Left Knee Exam     Inspection   Erythema: absent  Scars: absent  Swelling: absent  Effusion: absent  Deformity: deformity  Bruising: absent    Tenderness   The patient is experiencing no tenderness.         Range of Motion   Extension:  0   Left knee flexion: 135.     Tests   Meniscus   Beatriz:  Medial - negative Lateral - negative  Stability Lachman: normal (-1 to 2mm) PCL-Posterior Drawer: normal (0 to 2mm)  MCL - Valgus: normal (0 to 2mm)  LCL - Varus: normal (0 to 2mm)Pivot Shift: normal (Equal)Reverse Pivot Shift: normal (Equal)Dial Test at 30 degrees: normal (< 5 degrees)Dial Test at 90 degrees: normal (< 5 degrees)  Posterior Sag Test: negative  Posterolateral Corner: unstable (>15 degrees difference)  Patella   Patellar Apprehension: negative  Passive Patellar Tilt: neutral  Patellar Tracking: normal  Patellar Glide (Quadrants): Lateral - 1 Medial - 2  Q-Angle at 90 degrees: normal  Patellar Grind: negative  J-Sign: J sign absent    Other   Meniscal Cyst: absent  Popliteal (Baker's) Cyst: absent  Sensation: normal    Comments:  +TTP superolateral patella    Right Hip Exam     Tests   Jacqueline: negative  Left Hip Exam     Tests   Jacqueline: negative          Muscle Strength   Right Lower Extremity   Hip Abduction: 5/5   Quadriceps:  5/5   Hamstrin/5   Left Lower Extremity   Hip Abduction: 5/5   Quadriceps:  5/5   Hamstrin/5     Reflexes     Left Side  Quadriceps:  2+  Achilles:  2+    Right Side   Quadriceps:  2+  Achilles:  2+    Vascular Exam     Right Pulses  Dorsalis Pedis:      2+  Posterior Tibial:      2+        Left Pulses  Dorsalis Pedis:      2+  Posterior Tibial:      2+        Narrative      MRI of the left knee without contrast.    Technique: The following sequences were obtained.  Localizer, coronal PD FS   and T2 FS ; sagittal T1 , PD fat sat, T2 FS, axial PD FS.    Comparison: Correlation with radiographs   17    Findings:      Menisci: The lateral and medial menisci are intact.  No tear    Ligaments: The ACL, PCL, MCL and LCL complex appear normal.     Tendons: The quadricep, patellar and popliteus tendon appear normal.    Cartilage:      -Patellofemoral cartilage:The femoral patellar cartilage is intact.    -Medial  tibiofemoral cartilage:The medial knee compartment cartilage is intact.    -Lateral tibiofemoral cartilage:The lateral knee compartment cartilage is intact.    Bone:No bone marrow edema no fracture, no osseous lesions.    Miscellaneous: There is a small joint effusion.  There is a small elongated lobulated fluid collection with septation underneath the insertion of the the patellar tendon medially, extending medially and posteriorly underneath the MCL near its insertion, likely a small elongated ganglion cyst.  There is a small amount of fluid in the infrapatellar bursa.  There is a nonspecific a small cleavage plane within the mid substance of the Hoffa's fat pad with no significant edema of this Hoffa's fat pad, it is of uncertain clinical significance.   Impression         1.There is a small joint effusion.  There is a small cleavage plane within the mid Hoffa fat pad   substance which is of unclear clinical significance.  2.There is an elongated small synovial cyst seen underneath the insertion of the patellar tendon medially extending around the medial aspect of the tibia underneath the MCL insertion.                 Assessment:       Encounter Diagnoses   Name Primary?    Acute pain of left knee Yes    Arthralgia of left lower leg     Internal derangement of left knee           Plan:       1. IKDC, SF-12 and KOOS was filled out today in clinic.     RTC in 4 weeks with Dr. Saranya Dahliwal Patient will not fill out IKDC, SF-12 and KOOS on return.    2. Will order PT    3. Tylenol PRN as pt cannot tolerate NSAIDs                   [facility] patient questionnaires have been collected today.

## 2017-10-12 RX ORDER — OLMESARTAN MEDOXOMIL AND HYDROCHLOROTHIAZIDE 40/25 40; 25 MG/1; MG/1
1 TABLET ORAL DAILY
Qty: 30 TABLET | Refills: 6 | OUTPATIENT
Start: 2017-10-12 | End: 2017-10-18 | Stop reason: ALTCHOICE

## 2017-10-16 ENCOUNTER — PATIENT MESSAGE (OUTPATIENT)
Dept: NEPHROLOGY | Facility: CLINIC | Age: 52
End: 2017-10-16

## 2017-10-16 RX ORDER — OLMESARTAN MEDOXOMIL 40 MG/1
40 TABLET ORAL DAILY
Qty: 30 TABLET | Refills: 11 | Status: SHIPPED | OUTPATIENT
Start: 2017-10-16 | End: 2017-10-18

## 2017-10-17 ENCOUNTER — CLINICAL SUPPORT (OUTPATIENT)
Dept: REHABILITATION | Facility: HOSPITAL | Age: 52
End: 2017-10-17
Attending: ORTHOPAEDIC SURGERY
Payer: MEDICARE

## 2017-10-17 DIAGNOSIS — R52 PAIN: ICD-10-CM

## 2017-10-17 PROCEDURE — G8979 MOBILITY GOAL STATUS: HCPCS | Mod: CK | Performed by: PHYSICAL THERAPIST

## 2017-10-17 PROCEDURE — 97161 PT EVAL LOW COMPLEX 20 MIN: CPT | Performed by: PHYSICAL THERAPIST

## 2017-10-17 PROCEDURE — G8978 MOBILITY CURRENT STATUS: HCPCS | Mod: CL | Performed by: PHYSICAL THERAPIST

## 2017-10-17 RX ORDER — METHOCARBAMOL 500 MG/1
TABLET, FILM COATED ORAL
Refills: 0 | COMMUNITY
Start: 2017-08-21 | End: 2017-10-18

## 2017-10-17 RX ORDER — KETOROLAC TROMETHAMINE 10 MG/1
TABLET, FILM COATED ORAL
Refills: 0 | COMMUNITY
Start: 2017-08-21 | End: 2017-10-18

## 2017-10-17 NOTE — PROGRESS NOTES
"Physician:Saranya Dhaliwal MD  Diagnosis: L knee pain  Encounter Diagnosis   Name Primary?    Pain       Orders:  Physical Therapy evaluate and treat  Treatment start time: 8:20  Treatment end time: 9:00    Subjective:  Pt states he began having L knee pain of insidious onset on 8-19-17 and it worsened after having a cortisone injection on 8-29-17.  He currently c/o much pain with any wt-bearing activities.  He rates pain as 7/10 presently over anterior knee/thigh. He c/o thigh being "tight."    Chief complaint:  pain  Radicular symptoms:  none  Aggravating factors:   walking  Easing factors:  rest     Current functional status:   independent with ADL    Patients structured exercise routine:    none  Exercise routine prior to onset :     none    Special tests:    MRI:    + for small cyst under patella tendon insertion  Xray:    neg    Past treatment includes:  none    Work:     disabled                             Pts goals:  Decrease pain with activity    OBJECTIVE:  Postural examination:  obese     Functional assessment:   - walking:   Independent without a limp             AROM:  WNL     MMT:   4/5 hip abductors, 4+/5 quads and 5/5 hams    Tone:  Decreased quads; FAIR QS ability; no lag with SLR    Flexibility testing:   Tight hams    Special tests:   Neg varus/valgus stresses; neg Lachman and Beatriz's    Palpation:   No TTP    Joint mobility: good    Swelling:  none    Other: sensation intact to light touch      History  Co-morbidities and personal factors that may impact the plan of care Examination  Body Structures and Functions, activity limitations and participation restrictions that may impact the plan of care    Clinical Presentation   Co-morbidities:   high BMI; DM        Personal Factors:   no deficits Body Regions:   lower extremities    Body Systems:    strength            Participation Restrictions:   No heavy lifting or squatting     Activity limitations:   Learning and applying knowledge  no " deficits    General Tasks and Commands  no deficits    Communication  no deficits    Mobility  no deficits    Self care  no deficits    Domestic Life  no deficits    Interactions/Relationships  no deficits    Life Areas  no deficits    Community and Social Life  no deficits         stable and uncomplicated                      low       TREATMENT:    Today's treatment:  HEP    Pt was provided with a written copy of exercises to perform as tolerated, including: SLR x 3 ways, hams curls, QS, AP, GS, HSS and adductor squeeze.    Exercises were reviewed and pt was able to demonstrate them prior to the end of the session.     Pt was provided educational information, including: icing for pain.    Discussed insurance limitations with pt.     ASSESSMENT:  PT diagnosis: L knee pain with weakness   Patient can benefit from outpatient physical therapy and a home program  Prognosis is Fair.    No cultural, environmental or spiritual barriers identified to treatment or learning.     Medical necessity is demonstrated by the following  IMPAIRMENTS:  Pain limits function of effected part for some activities and Weakness    GOALS:    4_   weeks. Pt agrees with goals set.  1. Independent with HEP.  2. Report decreased    L knee    pain  <   / =  4/10 with adls such as walking  3. Increased MMT  for  L LE to 4+/5 to 5/5 with ADL    4.     PLAN:  Outpatient physical therapy 2 times weekly to include: pt ed, hep, therapeutic exercises, neuromuscular re-education/ balance exercises, joint mobilizations, modalities prn, and aquatic therapy.    Cont PT for  6         weeks.   I certify the need for these services   furnished under this plan of treatment and while under my   care.____________________________________ Physician/Referring Practitioner Date   of Signature

## 2017-10-18 ENCOUNTER — OFFICE VISIT (OUTPATIENT)
Dept: FAMILY MEDICINE | Facility: CLINIC | Age: 52
End: 2017-10-18
Attending: FAMILY MEDICINE
Payer: MEDICARE

## 2017-10-18 ENCOUNTER — LAB VISIT (OUTPATIENT)
Dept: LAB | Facility: HOSPITAL | Age: 52
End: 2017-10-18
Attending: FAMILY MEDICINE
Payer: MEDICARE

## 2017-10-18 VITALS
DIASTOLIC BLOOD PRESSURE: 92 MMHG | HEIGHT: 74 IN | WEIGHT: 315 LBS | OXYGEN SATURATION: 98 % | HEART RATE: 81 BPM | BODY MASS INDEX: 40.43 KG/M2 | SYSTOLIC BLOOD PRESSURE: 140 MMHG

## 2017-10-18 DIAGNOSIS — I10 HTN (HYPERTENSION), BENIGN: ICD-10-CM

## 2017-10-18 DIAGNOSIS — I10 HTN (HYPERTENSION), BENIGN: Primary | ICD-10-CM

## 2017-10-18 LAB
ALBUMIN SERPL BCP-MCNC: 3.2 G/DL
ALP SERPL-CCNC: 67 U/L
ALT SERPL W/O P-5'-P-CCNC: 18 U/L
ANION GAP SERPL CALC-SCNC: 9 MMOL/L
AST SERPL-CCNC: 22 U/L
BILIRUB SERPL-MCNC: 0.4 MG/DL
BUN SERPL-MCNC: 31 MG/DL
CALCIUM SERPL-MCNC: 9.5 MG/DL
CHLORIDE SERPL-SCNC: 105 MMOL/L
CO2 SERPL-SCNC: 28 MMOL/L
CREAT SERPL-MCNC: 2.4 MG/DL
EST. GFR  (AFRICAN AMERICAN): 35 ML/MIN/1.73 M^2
EST. GFR  (NON AFRICAN AMERICAN): 30.3 ML/MIN/1.73 M^2
GLUCOSE SERPL-MCNC: 94 MG/DL
POTASSIUM SERPL-SCNC: 4.3 MMOL/L
PROT SERPL-MCNC: 7.6 G/DL
SODIUM SERPL-SCNC: 142 MMOL/L

## 2017-10-18 PROCEDURE — 99213 OFFICE O/P EST LOW 20 MIN: CPT | Mod: S$PBB,,, | Performed by: FAMILY MEDICINE

## 2017-10-18 PROCEDURE — 99213 OFFICE O/P EST LOW 20 MIN: CPT | Mod: PBBFAC,PO | Performed by: FAMILY MEDICINE

## 2017-10-18 PROCEDURE — 99999 PR PBB SHADOW E&M-EST. PATIENT-LVL III: CPT | Mod: PBBFAC,,, | Performed by: FAMILY MEDICINE

## 2017-10-18 PROCEDURE — 36415 COLL VENOUS BLD VENIPUNCTURE: CPT | Mod: PO

## 2017-10-18 PROCEDURE — 80053 COMPREHEN METABOLIC PANEL: CPT

## 2017-10-18 RX ORDER — OLMESARTAN MEDOXOMIL 40 MG/1
40 TABLET ORAL DAILY
Qty: 90 TABLET | Refills: 3 | Status: SHIPPED | OUTPATIENT
Start: 2017-10-18 | End: 2018-09-14

## 2017-10-18 RX ORDER — HYDROCHLOROTHIAZIDE 25 MG/1
25 TABLET ORAL DAILY
Qty: 90 TABLET | Refills: 3 | Status: SHIPPED | OUTPATIENT
Start: 2017-10-18 | End: 2018-02-19 | Stop reason: SDUPTHER

## 2017-10-18 NOTE — PATIENT INSTRUCTIONS
Your test results will be communicated to you via : [facility], Telephone or Letter.   If you have not received your test results in one week, please contact the clinic at [phone].

## 2017-10-20 DIAGNOSIS — Z12.11 COLON CANCER SCREENING: ICD-10-CM

## 2017-10-24 ENCOUNTER — CLINICAL SUPPORT (OUTPATIENT)
Dept: REHABILITATION | Facility: HOSPITAL | Age: 52
End: 2017-10-24
Attending: ORTHOPAEDIC SURGERY
Payer: MEDICARE

## 2017-10-24 DIAGNOSIS — R52 PAIN: ICD-10-CM

## 2017-10-24 PROCEDURE — 97110 THERAPEUTIC EXERCISES: CPT | Performed by: PHYSICAL THERAPIST

## 2017-10-24 NOTE — PROGRESS NOTES
"Subjective:       Patient ID: Fausto Pandey is a 52 y.o. male.    Chief Complaint: Hypertension    HPI   Pt is here for follow up of htn stable on hydralazine and diuretic combo no sob/cp mildly elevated systolic today otherwise pt is well   Review of Systems   Constitutional: Positive for activity change. Negative for chills, fatigue and unexpected weight change.   HENT: Negative for hearing loss, rhinorrhea and trouble swallowing.    Eyes: Negative for discharge and visual disturbance.   Respiratory: Negative for cough, chest tightness and wheezing.    Cardiovascular: Negative for chest pain and palpitations.   Gastrointestinal: Negative for blood in stool, constipation, diarrhea and vomiting.   Endocrine: Negative for polydipsia and polyuria.   Genitourinary: Negative for difficulty urinating, hematuria and urgency.   Musculoskeletal: Positive for arthralgias. Negative for joint swelling and neck pain.   Neurological: Negative for weakness and headaches.   Psychiatric/Behavioral: Negative for confusion and dysphoric mood.       Objective:      Physical Exam   Constitutional: He appears well-developed and well-nourished. No distress.   Cardiovascular: Normal rate and regular rhythm.  Exam reveals no gallop.    Pulmonary/Chest: Effort normal and breath sounds normal. No respiratory distress. He has no rales.   Abdominal: Soft. Bowel sounds are normal. He exhibits no distension and no mass. There is no tenderness.   Musculoskeletal: Normal range of motion. He exhibits no deformity.       Assessment:       1. HTN (hypertension), benign        Plan:     orders cmp lipid  Cont meds  Low salt diet  Graded exercise  Increase water intake rtc 1 month bp check        "This note will not be shared with the patient."   "

## 2017-10-24 NOTE — PROGRESS NOTES
" Outpatient Physical Therapy Progress Note   Time in: 10:45  Time out: 11:40  Ther ex time: 30 min    Visit # 2    Subjective:  Pt states L knee feels "good" at rest and rates pain as 0/10, 8/10 with walking.  States doing HEP as instructed.    Objective:  No swelling.  No TTP.  Tight quads.    Treatment:  There ex and modalities for increased ROM/strength and improved ADL to include:  Bike x 10 min, HSS, SLR x 3 ways with 3#, SAQ with 3#, STM quads with rolling stick x 8 min, manual quad stretch, HEP review and CP x 10 min.    Assessment:  Tolerated exercises well.     Will the patient continue to benefit from skilled PT intervention?     yes    Medical necessity is demonstrated by:   Pain limits function of effected part for all activities  Unable to participate fully in daily activities      Progress towards goals: fair    New/Revised Goals:    Plan:  Continue with established Plan of Care toward PT goals.        "

## 2017-10-27 ENCOUNTER — CLINICAL SUPPORT (OUTPATIENT)
Dept: REHABILITATION | Facility: HOSPITAL | Age: 52
End: 2017-10-27
Attending: ORTHOPAEDIC SURGERY
Payer: MEDICARE

## 2017-10-27 DIAGNOSIS — R52 PAIN: Primary | ICD-10-CM

## 2017-10-27 PROCEDURE — 97140 MANUAL THERAPY 1/> REGIONS: CPT

## 2017-10-27 PROCEDURE — 97110 THERAPEUTIC EXERCISES: CPT

## 2017-10-27 NOTE — PROGRESS NOTES
" Outpatient Physical Therapy Progress Note   Time in: 1020  Time out: 1120  Ther ex time: 30 min    Visit # 3    Subjective:  Pt reporting min pain in L knee when arrived for tx. Pain scale 3/10.  States compliant with HEP     Objective:  No swelling.  No TTP.  Tight quads.    Treatment:  There ex and modalities for increased ROM/strength and improved ADL to include:  Bike x 10 min for increased ROM, manual therapy - L LE -calf, HS, hip flexor stretch and PROM x 10' GSS w/strap 3x/30" HSS w/strap 3x/30", SLR x 3 ways with 3#, Prone L knee flex 3#, SAQ with 3#, STM quads with rolling stick x 5 min, HEP review and CP x 10 min.    Assessment:  Tolerated exercises fair. Min increased L quad pain with therex but decreased with STM roller.  L quad fatigue and weakness after tx. VC/TC for correcting form and technique with exercise and quad activation. Cont to progress as tolerated.      Will the patient continue to benefit from skilled PT intervention?     yes    Medical necessity is demonstrated by:   Pain limits function of effected part for all activities  Unable to participate fully in daily activities      Progress towards goals: fair    New/Revised Goals:    Plan:  Continue with established Plan of Care toward PT goals.        "

## 2017-10-31 ENCOUNTER — CLINICAL SUPPORT (OUTPATIENT)
Dept: REHABILITATION | Facility: HOSPITAL | Age: 52
End: 2017-10-31
Attending: ORTHOPAEDIC SURGERY
Payer: MEDICARE

## 2017-10-31 DIAGNOSIS — R52 PAIN: Primary | ICD-10-CM

## 2017-10-31 PROCEDURE — 97140 MANUAL THERAPY 1/> REGIONS: CPT

## 2017-10-31 PROCEDURE — 97110 THERAPEUTIC EXERCISES: CPT

## 2017-10-31 NOTE — PROGRESS NOTES
" Outpatient Physical Therapy Progress Note   Time in: 0825  Time out: 0925  Ther ex time: 30 min    Visit # 3    Subjective:  Pt reporting "its feeling better".  Min pain in L knee when arrived for tx. Pain scale 2-3/10.  States compliant with HEP     Objective:  No swelling.  No TTP.  Tight quads.    Treatment:  There ex and modalities for increased ROM/strength and improved ADL to include:  Bike x 10 min for increased ROM, manual therapy - L LE -calf, HS, hip flexor stretch and PROM x 10', GSS w/strap 3x/30" HSS w/strap 3x/30", SLR x 3 ways with 3#, Prone L knee flex 3#, SAQ with 3#, STM quads with rolling stick x 5 min, HEP review and CP x 10 min.    Assessment:  Tolerated exercises well.  L quad fatigue and weakness after tx. VC/TC for correcting form and technique with exercise and quad activation. Cont to progress as tolerated.      Will the patient continue to benefit from skilled PT intervention?     yes    Medical necessity is demonstrated by:   Pain limits function of effected part for all activities  Unable to participate fully in daily activities      Progress towards goals: fair    New/Revised Goals:    Plan:  Continue with established Plan of Care toward PT goals.        "

## 2017-11-03 ENCOUNTER — CLINICAL SUPPORT (OUTPATIENT)
Dept: REHABILITATION | Facility: HOSPITAL | Age: 52
End: 2017-11-03
Attending: ORTHOPAEDIC SURGERY
Payer: MEDICARE

## 2017-11-03 DIAGNOSIS — R52 PAIN: Primary | ICD-10-CM

## 2017-11-03 PROCEDURE — 97140 MANUAL THERAPY 1/> REGIONS: CPT

## 2017-11-03 PROCEDURE — 97110 THERAPEUTIC EXERCISES: CPT

## 2017-11-03 NOTE — PROGRESS NOTES
" Outpatient Physical Therapy Progress Note   Time in: 0835  Time out: 0930  Ther ex time: 30 min    Visit # 5    Subjective:  Pt reporting "feeling good".   Min pain in L knee when arrived for tx. Pain scale 2/10.  States compliant with HEP     Objective:  No swelling.  No TTP.  Tight quads.    Treatment:  There ex and modalities for increased ROM/strength and improved ADL to include:  Bike x 10 min for increased ROM, manual therapy - L LE -calf, HS, hip flexor stretch and PROM x 10', GSS w/strap 3x/30" HSS w/strap 3x/30", SLR x 3 ways with 3#, Prone L knee flex 3#, LAQ with 3#, BTB hip abd walk,  STM quads with rolling stick x 5 min np, HEP review and CP x 10 min.    Assessment:  Tolerated exercises well.  L quad fatigue and weakness after tx. VC/TC for correcting form and technique with exercise and quad activation. Cont to progress as tolerated.      Will the patient continue to benefit from skilled PT intervention?     yes    Medical necessity is demonstrated by:   Pain limits function of effected part for all activities  Unable to participate fully in daily activities      Progress towards goals: fair    New/Revised Goals:    Plan:  Continue with established Plan of Care toward PT goals.        "

## 2017-11-15 ENCOUNTER — OFFICE VISIT (OUTPATIENT)
Dept: SPORTS MEDICINE | Facility: CLINIC | Age: 52
End: 2017-11-15
Payer: MEDICARE

## 2017-11-15 VITALS — HEIGHT: 74 IN | WEIGHT: 315 LBS | BODY MASS INDEX: 40.43 KG/M2

## 2017-11-15 DIAGNOSIS — M23.92 INTERNAL DERANGEMENT OF LEFT KNEE: Primary | ICD-10-CM

## 2017-11-15 DIAGNOSIS — M25.562 ARTHRALGIA OF LEFT LOWER LEG: ICD-10-CM

## 2017-11-15 PROCEDURE — 99499 UNLISTED E&M SERVICE: CPT | Mod: S$PBB,,, | Performed by: ORTHOPAEDIC SURGERY

## 2017-11-15 PROCEDURE — 99213 OFFICE O/P EST LOW 20 MIN: CPT | Mod: PBBFAC,PO | Performed by: ORTHOPAEDIC SURGERY

## 2017-11-15 PROCEDURE — 99999 PR PBB SHADOW E&M-EST. PATIENT-LVL III: CPT | Mod: PBBFAC,,, | Performed by: ORTHOPAEDIC SURGERY

## 2017-11-22 ENCOUNTER — PATIENT OUTREACH (OUTPATIENT)
Dept: ADMINISTRATIVE | Facility: HOSPITAL | Age: 52
End: 2017-11-22

## 2017-11-22 NOTE — PROGRESS NOTES
[facility] is committed to your overall health.  To help you get the most out of each of your visits, we will review your information to make sure you are up to date on all of your recommended tests and/or procedures.      Dr. Lynn has found that your chart shows you may be due for:    Health Maintenance Due   Topic Date Due    Lipid Panel  1965    TETANUS VACCINE  09/30/1985    Pneumococcal PPSV23 (Medium Risk) (1) 09/30/1985    Colonoscopy  09/30/2017        If you have had any of the above done at another facility, please bring the records or information with you so that your record at [facility] will be complete. You may also fax your records in advance to [phone].   If you would like to schedule any of these, please contact me.    Cherelle Duran LPN  Clinic Care Coordinator  Internal Medicine  [facility]  [address] [address]  [phone]

## 2018-02-19 ENCOUNTER — PATIENT MESSAGE (OUTPATIENT)
Dept: NEPHROLOGY | Facility: CLINIC | Age: 53
End: 2018-02-19

## 2018-02-19 RX ORDER — HYDRALAZINE HYDROCHLORIDE 50 MG/1
50 TABLET, FILM COATED ORAL EVERY 8 HOURS
Qty: 90 TABLET | Refills: 11 | Status: SHIPPED | OUTPATIENT
Start: 2018-02-19 | End: 2019-12-10 | Stop reason: SDUPTHER

## 2018-02-19 RX ORDER — HYDROCHLOROTHIAZIDE 25 MG/1
25 TABLET ORAL DAILY
Qty: 90 TABLET | Refills: 3 | Status: SHIPPED | OUTPATIENT
Start: 2018-02-19 | End: 2018-12-13 | Stop reason: SDUPTHER

## 2018-08-16 DIAGNOSIS — N18.30 CHRONIC KIDNEY DISEASE, STAGE III (MODERATE): Primary | ICD-10-CM

## 2018-09-04 RX ORDER — SPIRONOLACTONE 25 MG/1
25 TABLET ORAL DAILY
Qty: 30 TABLET | Refills: 6 | Status: SHIPPED | OUTPATIENT
Start: 2018-09-04 | End: 2018-12-13 | Stop reason: SDUPTHER

## 2018-09-14 ENCOUNTER — HOSPITAL ENCOUNTER (OUTPATIENT)
Dept: RADIOLOGY | Facility: OTHER | Age: 53
Discharge: HOME | End: 2018-09-14
Attending: INTERNAL MEDICINE
Payer: MEDICARE

## 2018-09-14 ENCOUNTER — OFFICE VISIT (OUTPATIENT)
Dept: INTERNAL MEDICINE | Facility: CLINIC | Age: 53
End: 2018-09-14
Attending: INTERNAL MEDICINE
Payer: MEDICARE

## 2018-09-14 VITALS
HEART RATE: 90 BPM | HEIGHT: 74 IN | WEIGHT: 315 LBS | DIASTOLIC BLOOD PRESSURE: 82 MMHG | SYSTOLIC BLOOD PRESSURE: 140 MMHG | BODY MASS INDEX: 40.43 KG/M2 | OXYGEN SATURATION: 97 %

## 2018-09-14 DIAGNOSIS — G89.29 CHRONIC BILATERAL LOW BACK PAIN WITHOUT SCIATICA: ICD-10-CM

## 2018-09-14 DIAGNOSIS — I10 ESSENTIAL HYPERTENSION: ICD-10-CM

## 2018-09-14 DIAGNOSIS — M54.50 CHRONIC BILATERAL LOW BACK PAIN WITHOUT SCIATICA: ICD-10-CM

## 2018-09-14 DIAGNOSIS — N18.30 CHRONIC KIDNEY DISEASE, STAGE III (MODERATE): ICD-10-CM

## 2018-09-14 DIAGNOSIS — M54.50 CHRONIC BILATERAL LOW BACK PAIN WITHOUT SCIATICA: Primary | ICD-10-CM

## 2018-09-14 DIAGNOSIS — G89.29 CHRONIC BILATERAL LOW BACK PAIN WITHOUT SCIATICA: Primary | ICD-10-CM

## 2018-09-14 PROCEDURE — 72100 X-RAY EXAM L-S SPINE 2/3 VWS: CPT | Mod: 26,,, | Performed by: RADIOLOGY

## 2018-09-14 PROCEDURE — 72120 X-RAY BEND ONLY L-S SPINE: CPT | Mod: TC,FY

## 2018-09-14 PROCEDURE — 72120 X-RAY BEND ONLY L-S SPINE: CPT | Mod: 26,,, | Performed by: RADIOLOGY

## 2018-09-14 PROCEDURE — 99214 OFFICE O/P EST MOD 30 MIN: CPT | Mod: S$PBB,,, | Performed by: INTERNAL MEDICINE

## 2018-09-14 PROCEDURE — 99999 PR PBB SHADOW E&M-EST. PATIENT-LVL IV: CPT | Mod: PBBFAC,,, | Performed by: INTERNAL MEDICINE

## 2018-09-14 PROCEDURE — 99214 OFFICE O/P EST MOD 30 MIN: CPT | Mod: PBBFAC,25 | Performed by: INTERNAL MEDICINE

## 2018-09-14 RX ORDER — CYCLOBENZAPRINE HCL 10 MG
10 TABLET ORAL 3 TIMES DAILY PRN
Qty: 30 TABLET | Refills: 0 | Status: SHIPPED | OUTPATIENT
Start: 2018-09-14 | End: 2018-09-24

## 2018-09-14 RX ORDER — OLMESARTAN MEDOXOMIL 40 MG/1
40 TABLET ORAL DAILY
Qty: 90 TABLET | Refills: 0 | Status: SHIPPED | OUTPATIENT
Start: 2018-09-14 | End: 2018-12-06 | Stop reason: SDUPTHER

## 2018-09-14 RX ORDER — DICLOFENAC SODIUM 75 MG/1
75 TABLET, DELAYED RELEASE ORAL 2 TIMES DAILY
Qty: 60 TABLET | Refills: 1 | Status: SHIPPED | OUTPATIENT
Start: 2018-09-14 | End: 2019-06-29

## 2018-09-14 RX ORDER — AMLODIPINE BESYLATE 10 MG/1
10 TABLET ORAL DAILY
Qty: 90 TABLET | Refills: 0 | Status: SHIPPED | OUTPATIENT
Start: 2018-09-14 | End: 2018-12-06 | Stop reason: SDUPTHER

## 2018-09-14 RX ORDER — METHYLPREDNISOLONE 4 MG/1
TABLET ORAL
Qty: 1 PACKAGE | Refills: 0 | Status: SHIPPED | OUTPATIENT
Start: 2018-09-14 | End: 2018-12-13

## 2018-09-14 RX ORDER — DICLOFENAC SODIUM 75 MG/1
TABLET, DELAYED RELEASE ORAL
Qty: 180 TABLET | Refills: 1 | OUTPATIENT
Start: 2018-09-14

## 2018-09-14 NOTE — PROGRESS NOTES
"Subjective:       Patient ID: Fausto Pandey is a 52 y.o. male.    Chief Complaint: Tailbone Pain    Here for urgent visit    53 yo gentleman whom is new to me presents with PMHx of HTN, CKD 3, chronic low back pain with hx of sciatica who woke up 1.5 weeks prior with back pain lower than his normal back pain. Pain is 8/10, radidates to bilateral thigh medially and laterally described as a shocking, shooting sensation. Does not radiate past knee. Patient denies numbness//tingling of lower ext, weakness of LE, saddle anesthesia, bowel/bladder incontinence, or F/C. Not taking anything for symptoms.     He is out of 2 of his BP meds today and requesting refill        Review of Systems    Objective:      Vitals:    09/14/18 1411   BP: (!) 140/82   Pulse: 90   SpO2: 97%   Weight: (!) 151.3 kg (333 lb 8.9 oz)   Height: 6' 2" (1.88 m)      Physical Exam   Constitutional: He is oriented to person, place, and time. He appears well-developed and well-nourished. He does not have a sickly appearance. No distress.   HENT:   Head: Normocephalic and atraumatic.   Eyes: Conjunctivae and EOM are normal. Right eye exhibits no discharge. Left eye exhibits no discharge. No scleral icterus.   Pulmonary/Chest: Effort normal. No respiratory distress.   Abdominal: Normal appearance. He exhibits no distension.   Musculoskeletal:        Lumbar back: He exhibits decreased range of motion. He exhibits no tenderness, no bony tenderness and no deformity.   SLR (-) bilaterally. Sensation to light touch intact bilaterally.   Neurological: He is alert and oriented to person, place, and time.   Skin: Skin is warm and dry. He is not diaphoretic.   Psychiatric: He has a normal mood and affect. His speech is normal.       Assessment:       1. Chronic bilateral low back pain without sciatica    2. Chronic kidney disease, stage III (moderate)    3. Essential hypertension        Plan:       Fausto was seen today for tailbone pain.    Diagnoses and " all orders for this visit:    Chronic bilateral low back pain without sciatica  -     cyclobenzaprine (FLEXERIL) 10 MG tablet; Take 1 tablet (10 mg total) by mouth 3 (three) times daily as needed for Muscle spasms.  -     methylPREDNISolone (MEDROL DOSEPACK) 4 mg tablet; use as directed  -     X-Ray Lumbar Spine Ap Lateral w/Flex Ext; Future  -     Ambulatory Referral to [facility]    Chronic kidney disease, stage III (moderate)   -diclofenac ordered in error. Pt instructed not to fill he voiced understanding.     Essential hypertension  -     olmesartan (BENICAR) 40 MG tablet; Take 1 tablet (40 mg total) by mouth once daily.  -     amLODIPine (NORVASC) 10 MG tablet; Take 1 tablet (10 mg total) by mouth once daily.     -f/u in 3-4 weeks for nurse visit for BP check                   Side effects of medication(s) were discussed in detail and patient voiced understanding.  Patient will call back for any issues or complications.

## 2018-09-27 ENCOUNTER — TELEPHONE (OUTPATIENT)
Dept: SPINE | Facility: CLINIC | Age: 53
End: 2018-09-27

## 2018-09-28 ENCOUNTER — OFFICE VISIT (OUTPATIENT)
Dept: SPINE | Facility: CLINIC | Age: 53
End: 2018-09-28
Attending: INTERNAL MEDICINE
Payer: MEDICARE

## 2018-09-28 VITALS
DIASTOLIC BLOOD PRESSURE: 83 MMHG | HEIGHT: 74 IN | HEART RATE: 95 BPM | BODY MASS INDEX: 40.43 KG/M2 | WEIGHT: 315 LBS | SYSTOLIC BLOOD PRESSURE: 137 MMHG

## 2018-09-28 DIAGNOSIS — M51.36 DDD (DEGENERATIVE DISC DISEASE), LUMBAR: ICD-10-CM

## 2018-09-28 DIAGNOSIS — M54.50 ACUTE BILATERAL LOW BACK PAIN WITHOUT SCIATICA: Primary | ICD-10-CM

## 2018-09-28 DIAGNOSIS — M47.816 SPONDYLOSIS OF LUMBAR REGION WITHOUT MYELOPATHY OR RADICULOPATHY: ICD-10-CM

## 2018-09-28 PROCEDURE — 99999 PR PBB SHADOW E&M-EST. PATIENT-LVL IV: CPT | Mod: PBBFAC,,, | Performed by: PHYSICIAN ASSISTANT

## 2018-09-28 PROCEDURE — 99204 OFFICE O/P NEW MOD 45 MIN: CPT | Mod: S$PBB,,, | Performed by: PHYSICIAN ASSISTANT

## 2018-09-28 PROCEDURE — 99214 OFFICE O/P EST MOD 30 MIN: CPT | Mod: PBBFAC | Performed by: PHYSICIAN ASSISTANT

## 2018-09-28 RX ORDER — HYDROCODONE BITARTRATE AND ACETAMINOPHEN 7.5; 325 MG/1; MG/1
1 TABLET ORAL EVERY 8 HOURS PRN
Qty: 21 TABLET | Refills: 0 | Status: SHIPPED | OUTPATIENT
Start: 2018-09-28 | End: 2019-06-29

## 2018-09-28 RX ORDER — TIZANIDINE 4 MG/1
4 TABLET ORAL EVERY 8 HOURS PRN
Qty: 90 TABLET | Refills: 2 | Status: SHIPPED | OUTPATIENT
Start: 2018-09-28 | End: 2019-01-16 | Stop reason: SDUPTHER

## 2018-09-28 NOTE — PROGRESS NOTES
Subjective:     Patient ID:  Fausto Pandey is a 52 y.o. male.    [facility]    Chief Complaint: Low back pain    HPI    Fausto Pandey is a 52 y.o. male who presents with the above CC.  Patient states that about one month ago his kids were playing on him and his back and the next morning he woke up with severe low back pain across the low back and buttock region.  Pain is rated 9/10 that comes and goes and is worse with laying down and better with standing and walking.  No leg pain or paresthesias.    Patient has not had PT or ESIs.  Chiropractic therapy in the past has helped for back pain he has had on and off. No spine surgery.  Patient is currently not taking any medication.  Flexeril gave him a headache and the medrol pack did help.    Patient denies any recent accidents or trauma, no saddle anesthesias, and no bowel or bladder incontinence.      Review of Systems:  Please refer to page three of the spine center intake form for a complete review of systems.      Past Medical History:   Diagnosis Date    Hypertension      History reviewed. No pertinent surgical history.  Current Outpatient Medications on File Prior to Visit   Medication Sig Dispense Refill    amLODIPine (NORVASC) 10 MG tablet Take 1 tablet (10 mg total) by mouth once daily. 90 tablet 0    diclofenac (VOLTAREN) 75 MG EC tablet Take 1 tablet (75 mg total) by mouth 2 (two) times daily. 60 tablet 1    hydrALAZINE (APRESOLINE) 50 MG tablet Take 1 tablet (50 mg total) by mouth every 8 (eight) hours. 90 tablet 11    hydroCHLOROthiazide (HYDRODIURIL) 25 MG tablet Take 1 tablet (25 mg total) by mouth once daily. 90 tablet 3    methylPREDNISolone (MEDROL DOSEPACK) 4 mg tablet use as directed 1 Package 0    olmesartan (BENICAR) 40 MG tablet Take 1 tablet (40 mg total) by mouth once daily. 90 tablet 0    spironolactone (ALDACTONE) 25 MG tablet Take 1 tablet (25 mg total) by mouth once daily. 30 tablet 6     No current  "facility-administered medications on file prior to visit.      Review of patient's allergies indicates:  No Known Allergies  Social History     Socioeconomic History    Marital status: Single     Spouse name: Not on file    Number of children: Not on file    Years of education: Not on file    Highest education level: Not on file   Social Needs    Financial resource strain: Not on file    Food insecurity - worry: Not on file    Food insecurity - inability: Not on file    Transportation needs - medical: Not on file    Transportation needs - non-medical: Not on file   Occupational History    Not on file   Tobacco Use    Smoking status: Current Every Day Smoker     Packs/day: 0.50     Types: Cigarettes    Smokeless tobacco: Never Used   Substance and Sexual Activity    Alcohol use: Yes    Drug use: Not on file    Sexual activity: Not on file   Other Topics Concern    Not on file   Social History Narrative    Not on file     History reviewed. No pertinent family history.    Objective:      Vitals:    09/28/18 0841   BP: 137/83   Pulse: 95   Weight: (!) 151 kg (333 lb)   Height: 6' 2" (1.88 m)   PainSc:   9   PainLoc: Back         Physical Exam:    General:  Fausto Pandey is well-developed, well-nourished, appears stated age, in no acute distress, alert and oriented to person, place, and time.    Pulmonary/Chest:  Respiratory effort normal  Abdominal: Exhibits no distension  Psychiatric:  Normal mood and affect.  Behavior is normal.  Judgement and thought content normal    Musculoskeletal:    Patient arises from a sitting to standing position without difficulty.  Patient walks to the door without evidence of limp, pain, or abnormality of gait.     Lumbar ROM:   Pain in lumbar flexion, extension, right lateral bending, and left lateral bending.  Worse in extension.    Lumbar Spine Inspection:  Normal with no surgical scars and no visible rashes.    Lumbar Spine Palpation:  Moderate tenderness to low " back palpation.    SI Joint Palpation:  Moderate tenderness to bilateral SI Joint palpation.    Straight Leg Raise:  Negative right and left SLR.    Neurological: Alert and oriented to person, place, and time    Muscle strength against resistance:     Right Left   Hip flexion  5 / 5 5 / 5   Hip extension 5 / 5 5 / 5   Hip abduction 5 / 5 5 / 5   Hip adduction  5 / 5 5 / 5   Knee extension  5 / 5 5 / 5   Knee flexion 5 / 5 5 / 5   Dorsiflexion  5 / 5 5 / 5   EHL  5 / 5 5 / 5   Plantar flexion  5 / 5 5 / 5   Inversion of the feet 5 / 5 5 / 5   Eversion of the feet  5 / 5 5 / 5     Reflexes:     Right Left   Patellar 1+ 1+   Achilles 1+ 1+     Clonus:  Negative bilaterally    On gross examination of the bilateral upper extremities, patient has full painfree ROM with no signs of clubbing, cyanosis, edema, or weakness.     XRAY Interpretation:     Lumbar spine ap/lateral/flexion/extension xrays were personally reviewed today.  No fractures.  No movement on flexion and extension.  DDD and spondylosis at L4-5 and L5-S1 worse at L5-S1.      Assessment:          1. Acute bilateral low back pain without sciatica    2. DDD (degenerative disc disease), lumbar    3. Spondylosis of lumbar region without myelopathy or radiculopathy            Plan:          Orders Placed This Encounter    Ambulatory Referral to Physical/Occupational Therapy    tiZANidine (ZANAFLEX) 4 MG tablet    HYDROcodone-acetaminophen (NORCO) 7.5-325 mg per tablet       L4-5, L5-S1 DDD/spondylosis worse at L5-S1 with acute low back pain    -Zanaflex PRN now  -One time RX for Comins  -PT through [facility]  -FU in three months and if no relief will get MRI lumbar spine      Follow-Up:  Follow-up in about 3 months (around 12/28/2018). If there are any questions prior to this, the patient was instructed to contact the office.       Krys Hawkins, ILANA, PA-C  Neurosurgery  [facility]  [facility]

## 2018-09-28 NOTE — LETTER
September 28, 2018      Alvaro Nesbitt MD  [address]  [address]  [facility] [address]           [facility]  [address], [address]  [facility] [address]  Phone: [phone]  Fax: [phone]          Patient: Fausto Pandey   MR Number: [id number]   YOB: 1965   Date of Visit: 9/28/2018       Dear Dr. Alvaro Nesbitt:    Thank you for referring Fausto Pandey to me for evaluation. Attached you will find relevant portions of my assessment and plan of care.    If you have questions, please do not hesitate to call me. I look forward to following Fausto Pandey along with you.    Sincerely,    Krys Hawkins PA-C    Enclosure  CC:  No Recipients    If you would like to receive this communication electronically, please contact [email or web address] or [phone] to request more information on [employer] Link access.    For providers and/or their staff who would like to refer a patient to [facility], please contact us through our one-stop-shop provider referral line, [facility] [place], at [phone].    If you feel you have received this communication in error or would no longer like to receive these types of communications, please e-mail [email or web address]

## 2018-10-22 DIAGNOSIS — Z12.11 COLON CANCER SCREENING: ICD-10-CM

## 2018-12-06 ENCOUNTER — TELEPHONE (OUTPATIENT)
Dept: NEPHROLOGY | Facility: CLINIC | Age: 53
End: 2018-12-06

## 2018-12-06 RX ORDER — AMLODIPINE BESYLATE 10 MG/1
TABLET ORAL
Qty: 90 TABLET | Refills: 0 | Status: SHIPPED | OUTPATIENT
Start: 2018-12-06 | End: 2018-12-13 | Stop reason: SDUPTHER

## 2018-12-06 NOTE — TELEPHONE ENCOUNTER
----- Message from Karson Cody sent at 12/6/2018  3:08 PM CST -----  Contact: Wife/Valencia Birmingham called in regarding the attached patient () and stated patient needs to schedule his annual check up which was due in 9/2018.  System would not allow us to schedule.  Patient call back number is [phone]

## 2018-12-10 RX ORDER — OLMESARTAN MEDOXOMIL 40 MG/1
40 TABLET ORAL DAILY
Qty: 90 TABLET | Refills: 0 | Status: SHIPPED | OUTPATIENT
Start: 2018-12-10 | End: 2019-12-10 | Stop reason: SDUPTHER

## 2018-12-12 DIAGNOSIS — N18.30 CHRONIC KIDNEY DISEASE, STAGE III (MODERATE): Primary | ICD-10-CM

## 2018-12-13 ENCOUNTER — OFFICE VISIT (OUTPATIENT)
Dept: INTERNAL MEDICINE | Facility: CLINIC | Age: 53
End: 2018-12-13
Payer: MEDICARE

## 2018-12-13 VITALS
SYSTOLIC BLOOD PRESSURE: 165 MMHG | OXYGEN SATURATION: 98 % | HEIGHT: 73 IN | WEIGHT: 315 LBS | HEART RATE: 91 BPM | BODY MASS INDEX: 41.75 KG/M2 | DIASTOLIC BLOOD PRESSURE: 101 MMHG

## 2018-12-13 DIAGNOSIS — T78.40XA ALLERGIC REACTION, INITIAL ENCOUNTER: ICD-10-CM

## 2018-12-13 DIAGNOSIS — N18.30 CHRONIC KIDNEY DISEASE, STAGE III (MODERATE): ICD-10-CM

## 2018-12-13 DIAGNOSIS — I10 BENIGN ESSENTIAL HTN: Primary | ICD-10-CM

## 2018-12-13 PROBLEM — M25.562 ARTHRALGIA OF LEFT LOWER LEG: Status: RESOLVED | Noted: 2017-10-09 | Resolved: 2018-12-13

## 2018-12-13 PROBLEM — R52 PAIN: Status: RESOLVED | Noted: 2017-10-17 | Resolved: 2018-12-13

## 2018-12-13 PROBLEM — M25.562 ACUTE PAIN OF LEFT KNEE: Status: RESOLVED | Noted: 2017-10-09 | Resolved: 2018-12-13

## 2018-12-13 PROBLEM — M23.92 INTERNAL DERANGEMENT OF LEFT KNEE: Status: RESOLVED | Noted: 2017-10-09 | Resolved: 2018-12-13

## 2018-12-13 PROCEDURE — 99999 PR PBB SHADOW E&M-EST. PATIENT-LVL IV: CPT | Mod: PBBFAC,,, | Performed by: INTERNAL MEDICINE

## 2018-12-13 PROCEDURE — 99214 OFFICE O/P EST MOD 30 MIN: CPT | Mod: PBBFAC | Performed by: INTERNAL MEDICINE

## 2018-12-13 PROCEDURE — 99214 OFFICE O/P EST MOD 30 MIN: CPT | Mod: S$PBB,,, | Performed by: INTERNAL MEDICINE

## 2018-12-13 RX ORDER — DEXAMETHASONE 4 MG/1
4 TABLET ORAL EVERY 12 HOURS
Qty: 10 TABLET | Refills: 0 | Status: SHIPPED | OUTPATIENT
Start: 2018-12-13 | End: 2018-12-18

## 2018-12-13 RX ORDER — HYDROCHLOROTHIAZIDE 25 MG/1
25 TABLET ORAL DAILY
Qty: 90 TABLET | Refills: 4 | Status: SHIPPED | OUTPATIENT
Start: 2018-12-13 | End: 2019-12-10 | Stop reason: SDUPTHER

## 2018-12-13 RX ORDER — AMLODIPINE BESYLATE 10 MG/1
10 TABLET ORAL DAILY
Qty: 90 TABLET | Refills: 4 | Status: SHIPPED | OUTPATIENT
Start: 2018-12-13 | End: 2019-12-10 | Stop reason: SDUPTHER

## 2018-12-13 RX ORDER — CETIRIZINE HYDROCHLORIDE 10 MG/1
10 TABLET ORAL 2 TIMES DAILY
Qty: 10 TABLET | Refills: 0 | COMMUNITY
Start: 2018-12-13

## 2018-12-13 RX ORDER — SPIRONOLACTONE 25 MG/1
25 TABLET ORAL DAILY
Qty: 90 TABLET | Refills: 4 | Status: SHIPPED | OUTPATIENT
Start: 2018-12-13 | End: 2019-12-10 | Stop reason: SDUPTHER

## 2018-12-13 NOTE — PROGRESS NOTES
INTERNAL MEDICINE CLINIC - SAME DAY APPOINTMENT  Progress Note    PRESENTING HISTORY     PCP: Brisa Lynn MD  Chief Complaint/Reason for Visit:     Chief Complaint   Patient presents with    Rash     and refills      History of Present Illness & ROS : Mr. Fausto Pandey is a 53 y.o. male.      Had hair dye on his beard Monday. First time doing it.  Then he developed swelling to the area with itching.    He also needs refill of BP meds.    PAST HISTORY:     Past Medical History:   Diagnosis Date    Benign essential HTN 8/22/2017    Chronic kidney disease, stage III (moderate) 8/22/2017    Internal derangement of left knee 10/9/2017       No past surgical history on file.    No family history on file.    Social History     Socioeconomic History    Marital status: Single     Spouse name: None    Number of children: None    Years of education: None    Highest education level: None   Social Needs    Financial resource strain: None    Food insecurity - worry: None    Food insecurity - inability: None    Transportation needs - medical: None    Transportation needs - non-medical: None   Occupational History    None   Tobacco Use    Smoking status: Current Every Day Smoker     Packs/day: 0.50     Types: Cigarettes    Smokeless tobacco: Never Used   Substance and Sexual Activity    Alcohol use: Yes    Drug use: None    Sexual activity: None   Other Topics Concern    None   Social History Narrative    None       MEDICATIONS & ALLERGIES:     Current Outpatient Medications on File Prior to Visit   Medication Sig Dispense Refill    diclofenac (VOLTAREN) 75 MG EC tablet Take 1 tablet (75 mg total) by mouth 2 (two) times daily. 60 tablet 1    hydrALAZINE (APRESOLINE) 50 MG tablet Take 1 tablet (50 mg total) by mouth every 8 (eight) hours. 90 tablet 11    HYDROcodone-acetaminophen (NORCO) 7.5-325 mg per tablet Take 1 tablet by mouth every 8 (eight) hours as needed for Pain. 21 tablet 0     olmesartan (BENICAR) 40 MG tablet Take 1 tablet (40 mg total) by mouth once daily. 90 tablet 0    tiZANidine (ZANAFLEX) 4 MG tablet Take 1 tablet (4 mg total) by mouth every 8 (eight) hours as needed. 90 tablet 2     amLODIPine (NORVASC) 10 MG tablet TAKE 1 TABLET(10 MG) BY MOUTH EVERY DAY 90 tablet 0     hydroCHLOROthiazide (HYDRODIURIL) 25 MG tablet Take 1 tablet (25 mg total) by mouth once daily. 90 tablet 3            spironolactone (ALDACTONE) 25 MG tablet Take 1 tablet (25 mg total) by mouth once daily. 30 tablet 6     No current facility-administered medications on file prior to visit.         Review of patient's allergies indicates:  No Known Allergies    Medications Reconciliation:   I have reconciled the patient's home medications with the patient/family. I have updated all changes.  Refer to After-Visit Medication List.    OBJECTIVE:     Vital Signs:  Vitals:    12/13/18 1449   BP: (!) 165/101   Pulse: 91     Wt Readings from Last 1 Encounters:   12/13/18 1449 (!) 151.2 kg (333 lb 5.4 oz)     Body mass index is 43.98 kg/m².     Physical Exam:  General: Well developed, well nourished. No distress.  HEENT: Head is normocephalic, atraumatic;   Eyes: Clear conjunctiva.  Neck: Supple, symmetrical neck; trachea midline.  Lungs: Clear to auscultation bilaterally and normal respiratory effort.  Cardiovascular: Heart with regular rate and rhythm.    Abdomen: Abdomen is soft, non-tender non-distended with normal bowel sounds.  Skin:       Swelling along the bearded area. No evidence of infection.      Laboratory  Lab Results   Component Value Date    WBC 6.70 08/22/2017    HGB 14.3 08/22/2017    HCT 41.9 08/22/2017     08/22/2017    ALT 18 10/18/2017    AST 22 10/18/2017     10/18/2017    K 4.3 10/18/2017     10/18/2017    CREATININE 2.4 (H) 10/18/2017    BUN 31 (H) 10/18/2017    CO2 28 10/18/2017    HGBA1C 4.8 08/22/2017       ASSESSMENT & PLAN:     Benign essential HTN  Chronic kidney  disease, stage III (moderate)  - Refilled:  -     spironolactone (ALDACTONE) 25 MG tablet; Take 1 tablet (25 mg total) by mouth once daily.  Dispense: 90 tablet; Refill: 4  -     amLODIPine (NORVASC) 10 MG tablet; Take 1 tablet (10 mg total) by mouth once daily.  Dispense: 90 tablet; Refill: 4  -     hydroCHLOROthiazide (HYDRODIURIL) 25 MG tablet; Take 1 tablet (25 mg total) by mouth once daily.  Dispense: 90 tablet; Refill: 4    Allergic reaction, initial encounter  - Due to hair color.     Plan:  -     dexamethasone (DECADRON) 4 MG Tab; Take 1 tablet (4 mg total) by mouth every 12 (twelve) hours. for 5 days  Dispense: 10 tablet; Refill: 0  -     cetirizine (ZYRTEC) 10 MG tablet; Take 1 tablet (10 mg total) by mouth 2 (two) times daily.  Dispense: 10 tablet; Refill: 0    Scheduled Follow-up :  Future Appointments   Date Time Provider Department Center   1/4/2019  8:00 AM Krys Hawkins PA-C [facility]   1/7/2019 10:35 AM LAB, APPOINTMENT [facility] VNP [facility]   1/7/2019 10:55 AM SPECIMEN, [facility] SPECLAB [facility]   1/10/2019  9:30 AM Ivan Barger MD [facility]       After Visit Medication List :     Medication List           Accurate as of 12/13/18  3:06 PM. If you have any questions, ask your nurse or doctor.               START taking these medications    cetirizine 10 MG tablet  Commonly known as:  ZYRTEC  Take 1 tablet (10 mg total) by mouth 2 (two) times daily.  Started by:  Enio Sanchez MD     dexamethasone 4 MG Tab  Commonly known as:  DECADRON  Take 1 tablet (4 mg total) by mouth every 12 (twelve) hours. for 5 days  Started by:  Enio Sanchez MD        CHANGE how you take these medications    amLODIPine 10 MG tablet  Commonly known as:  NORVASC  Take 1 tablet (10 mg total) by mouth once daily.  What changed:  See the new instructions.  Changed by:  Enio Sanchez MD        CONTINUE taking these medications    diclofenac 75 MG EC  tablet  Commonly known as:  VOLTAREN  Take 1 tablet (75 mg total) by mouth 2 (two) times daily.     hydrALAZINE 50 MG tablet  Commonly known as:  APRESOLINE  Take 1 tablet (50 mg total) by mouth every 8 (eight) hours.     hydroCHLOROthiazide 25 MG tablet  Commonly known as:  HYDRODIURIL  Take 1 tablet (25 mg total) by mouth once daily.     HYDROcodone-acetaminophen 7.5-325 mg per tablet  Commonly known as:  NORCO  Take 1 tablet by mouth every 8 (eight) hours as needed for Pain.     olmesartan 40 MG tablet  Commonly known as:  BENICAR  Take 1 tablet (40 mg total) by mouth once daily.     spironolactone 25 MG tablet  Commonly known as:  ALDACTONE  Take 1 tablet (25 mg total) by mouth once daily.     tiZANidine 4 MG tablet  Commonly known as:  ZANAFLEX  Take 1 tablet (4 mg total) by mouth every 8 (eight) hours as needed.        STOP taking these medications    methylPREDNISolone 4 mg tablet  Commonly known as:  MEDROL DOSEPACK  Stopped by:  Enio Sanchez MD           Where to Get Your Medications      These medications were sent to [facility] [address] [place] [address] AT SEC OF [address] [address]    Phone:  [phone]   · amLODIPine 10 MG tablet  · dexamethasone 4 MG Tab  · hydroCHLOROthiazide 25 MG tablet  · spironolactone 25 MG tablet     You can get these medications from any pharmacy    You don't need a prescription for these medications  · cetirizine 10 MG tablet         Signing Physician:  Enio Sanchez MD

## 2019-01-03 ENCOUNTER — PATIENT OUTREACH (OUTPATIENT)
Dept: ADMINISTRATIVE | Facility: HOSPITAL | Age: 54
End: 2019-01-03

## 2019-01-03 NOTE — PROGRESS NOTES
Dear Fausto Pandey        [facility] is committed to your overall health and would like to ensure that you are up to date on all of your health maintenance testing. Our records indicate that you are overdue for your colorectal cancer screening. After reviewing your chart, it has been documented that a FIT KIT (colorectal cancer screening kit) was given at a clinic visit or mailed to you, but the lab has yet to receive the kit so that we may update your chart. This is a friendly reminder to complete this test (the instructions will tell you how) and then return your sample in the postage-paid return envelope within 24 hours of collection or return to ANY [facility] Facility . Please remember to put the collection date on your sample!    If your test results are negative, you won't need testing again for another year. If results show you need more testing, we will call you with next steps.  Also, if you have misplaced the FITKIT let us know and we will mail you another one.  Sincerely,        Brisa Lynn MD and your [facility]

## 2019-01-16 RX ORDER — TIZANIDINE 4 MG/1
TABLET ORAL
Qty: 270 TABLET | Refills: 0 | Status: SHIPPED | OUTPATIENT
Start: 2019-01-16 | End: 2021-04-15

## 2019-02-07 ENCOUNTER — OFFICE VISIT (OUTPATIENT)
Dept: INTERNAL MEDICINE | Facility: CLINIC | Age: 54
End: 2019-02-07
Attending: INTERNAL MEDICINE
Payer: MEDICARE

## 2019-02-07 VITALS
DIASTOLIC BLOOD PRESSURE: 94 MMHG | TEMPERATURE: 98 F | BODY MASS INDEX: 40.43 KG/M2 | RESPIRATION RATE: 12 BRPM | WEIGHT: 315 LBS | HEIGHT: 74 IN | SYSTOLIC BLOOD PRESSURE: 162 MMHG | HEART RATE: 85 BPM | OXYGEN SATURATION: 95 %

## 2019-02-07 DIAGNOSIS — I10 BENIGN ESSENTIAL HTN: ICD-10-CM

## 2019-02-07 DIAGNOSIS — J06.9 UPPER RESPIRATORY TRACT INFECTION, UNSPECIFIED TYPE: Primary | ICD-10-CM

## 2019-02-07 DIAGNOSIS — N18.30 CHRONIC KIDNEY DISEASE, STAGE III (MODERATE): ICD-10-CM

## 2019-02-07 PROCEDURE — 99214 PR OFFICE/OUTPT VISIT, EST, LEVL IV, 30-39 MIN: ICD-10-PCS | Mod: S$PBB,,, | Performed by: INTERNAL MEDICINE

## 2019-02-07 PROCEDURE — 99214 OFFICE O/P EST MOD 30 MIN: CPT | Mod: S$PBB,,, | Performed by: INTERNAL MEDICINE

## 2019-02-07 PROCEDURE — 99999 PR PBB SHADOW E&M-EST. PATIENT-LVL IV: ICD-10-PCS | Mod: PBBFAC,,, | Performed by: INTERNAL MEDICINE

## 2019-02-07 PROCEDURE — 99214 OFFICE O/P EST MOD 30 MIN: CPT | Mod: PBBFAC | Performed by: INTERNAL MEDICINE

## 2019-02-07 PROCEDURE — 99999 PR PBB SHADOW E&M-EST. PATIENT-LVL IV: CPT | Mod: PBBFAC,,, | Performed by: INTERNAL MEDICINE

## 2019-02-07 NOTE — PATIENT INSTRUCTIONS
1)Mucinex DM  2)Nasal Steroids (Nasocort, Rhinocort, Flonase)  3)Distilled salt water sinus rinses via neti pots or products such as Isidoro Med Sinus Rinse or Sinugator. Must wash container or device and use bottled water to avoid introducing infection.   You can can use (as directed) any combination of these three things every day of your life if needed in order to treat or control your symptoms. Brand name use of medications is not necessary  Tylenol

## 2019-02-07 NOTE — PROGRESS NOTES
"Subjective:       Patient ID: Fausto Pandey is a 53 y.o. male.    Chief Complaint: Influenza    Here for urgent visit      Approx 36 hrs prior developed rhinorrhea, myalgias, throat discomfort, wheezing, SOB, productive cough. Patient denies F/C, chest tightness, eye pain, severe headache, inability to maintain adequate PO intake, abdominal pain, nausea/vomiting, or diarrhea. BP elevated today. Adherent with BP meds.           Review of Systems   Constitutional: Negative for appetite change, chills, fever and unexpected weight change.   HENT: Positive for congestion and rhinorrhea. Negative for hearing loss, sore throat and trouble swallowing.    Eyes: Negative for visual disturbance.   Respiratory: Positive for cough and shortness of breath. Negative for chest tightness.    Cardiovascular: Negative for chest pain and leg swelling.   Gastrointestinal: Negative for abdominal pain, blood in stool, constipation, diarrhea, nausea and vomiting.   Endocrine: Negative for polydipsia and polyuria.   Genitourinary: Negative for decreased urine volume, difficulty urinating, dysuria, frequency and urgency.   Musculoskeletal: Negative for gait problem.   Skin: Negative for rash.   Neurological: Negative for dizziness and numbness.   Psychiatric/Behavioral: The patient is not nervous/anxious.        Objective:      Vitals:    02/07/19 1425   BP: (!) 162/94   BP Location: Left arm   Patient Position: Sitting   BP Method: Large (Manual)   Pulse: 85   Resp: 12   Temp: 98.1 °F (36.7 °C)   SpO2: 95%   Weight: (!) 151 kg (332 lb 14.3 oz)   Height: 6' 2" (1.88 m)      Physical Exam   Constitutional: He is oriented to person, place, and time. He appears well-developed and well-nourished.   HENT:   Head: Normocephalic and atraumatic.   Right Ear: Tympanic membrane, external ear and ear canal normal.   Left Ear: Tympanic membrane, external ear and ear canal normal.   Nose: No mucosal edema or rhinorrhea.   Mouth/Throat: No " oropharyngeal exudate, posterior oropharyngeal edema or posterior oropharyngeal erythema.   Eyes: Conjunctivae and EOM are normal.   Pulmonary/Chest: Effort normal and breath sounds normal. No respiratory distress. He has no wheezes.   Abdominal: He exhibits no distension.   Musculoskeletal: He exhibits no edema.   Lymphadenopathy:     He has no cervical adenopathy.   Neurological: He is alert and oriented to person, place, and time.   Skin: Skin is warm and dry. No rash noted.       Assessment:       1. Upper respiratory tract infection, unspecified type    2. Benign essential HTN    3. Chronic kidney disease, stage III (moderate)        Plan:       Fausto was seen today for influenza.    Diagnoses and all orders for this visit:    Upper respiratory tract infection, unspecified type  Presumed viral. OTC meds discussed.  Prompts to call office discussed.    Benign essential HTN   Elevated today. Pt with acute infection.   Chronic kidney disease, stage III (moderate)  Avoid NSAIDS or any OTC cold meds that contain NSAIDS               Side effects of medication(s) were discussed in detail and patient voiced understanding.  Patient will call back for any issues or complications.

## 2019-06-25 ENCOUNTER — OFFICE VISIT (OUTPATIENT)
Dept: PODIATRY | Facility: CLINIC | Age: 54
End: 2019-06-25
Payer: MEDICARE

## 2019-06-25 VITALS — DIASTOLIC BLOOD PRESSURE: 89 MMHG | SYSTOLIC BLOOD PRESSURE: 138 MMHG | HEART RATE: 85 BPM

## 2019-06-25 DIAGNOSIS — M79.675 PAIN OF TOE OF LEFT FOOT: ICD-10-CM

## 2019-06-25 DIAGNOSIS — B35.1 DERMATOPHYTOSIS OF NAIL: Primary | ICD-10-CM

## 2019-06-25 PROCEDURE — 99213 OFFICE O/P EST LOW 20 MIN: CPT | Mod: PBBFAC,PN | Performed by: PODIATRIST

## 2019-06-25 PROCEDURE — 99203 PR OFFICE/OUTPT VISIT, NEW, LEVL III, 30-44 MIN: ICD-10-PCS | Mod: S$PBB,25,, | Performed by: PODIATRIST

## 2019-06-25 PROCEDURE — 99999 PR PBB SHADOW E&M-EST. PATIENT-LVL III: CPT | Mod: PBBFAC,,, | Performed by: PODIATRIST

## 2019-06-25 PROCEDURE — 99203 OFFICE O/P NEW LOW 30 MIN: CPT | Mod: S$PBB,25,, | Performed by: PODIATRIST

## 2019-06-25 PROCEDURE — 99999 PR PBB SHADOW E&M-EST. PATIENT-LVL III: ICD-10-PCS | Mod: PBBFAC,,, | Performed by: PODIATRIST

## 2019-06-25 PROCEDURE — 11730 AVULSION NAIL PLATE SIMPLE 1: CPT | Mod: PBBFAC,PN | Performed by: PODIATRIST

## 2019-06-25 PROCEDURE — 11730 NAIL REMOVAL: ICD-10-PCS | Mod: S$PBB,TA,, | Performed by: PODIATRIST

## 2019-06-25 NOTE — PROGRESS NOTES
Chief Complaint   Patient presents with    Ingrown Toenail     left big toe, pain           HPI:   Fausto Pandey is a 53 y.o. male who presents to clinic with complaints of painful thickened left hallux nail, present for a long time.  All nails are mycotic appearing. He reports no acute trauma to the toes.  Reports no drainage noted.         Patient Active Problem List   Diagnosis    Benign essential HTN    Chronic kidney disease, stage III (moderate)           Current Outpatient Medications on File Prior to Visit   Medication Sig Dispense Refill    amLODIPine (NORVASC) 10 MG tablet Take 1 tablet (10 mg total) by mouth once daily. 90 tablet 4    hydrALAZINE (APRESOLINE) 50 MG tablet Take 1 tablet (50 mg total) by mouth every 8 (eight) hours. 90 tablet 11    hydroCHLOROthiazide (HYDRODIURIL) 25 MG tablet Take 1 tablet (25 mg total) by mouth once daily. 90 tablet 4    olmesartan (BENICAR) 40 MG tablet Take 1 tablet (40 mg total) by mouth once daily. 90 tablet 0    spironolactone (ALDACTONE) 25 MG tablet Take 1 tablet (25 mg total) by mouth once daily. 90 tablet 4    cetirizine (ZYRTEC) 10 MG tablet Take 1 tablet (10 mg total) by mouth 2 (two) times daily. 10 tablet 0    diclofenac (VOLTAREN) 75 MG EC tablet Take 1 tablet (75 mg total) by mouth 2 (two) times daily. 60 tablet 1    HYDROcodone-acetaminophen (NORCO) 7.5-325 mg per tablet Take 1 tablet by mouth every 8 (eight) hours as needed for Pain. 21 tablet 0    tiZANidine (ZANAFLEX) 4 MG tablet TAKE 1 TABLET BY MOUTH EVERY 8 HOURS AS NEEDED 270 tablet 0     No current facility-administered medications on file prior to visit.             Review of patient's allergies indicates:  No Known Allergies        Social History     Socioeconomic History    Marital status: Single     Spouse name: Not on file    Number of children: Not on file    Years of education: Not on file    Highest education level: Not on file   Occupational History    Not on file    Social Needs    Financial resource strain: Not on file    Food insecurity:     Worry: Not on file     Inability: Not on file    Transportation needs:     Medical: Not on file     Non-medical: Not on file   Tobacco Use    Smoking status: Current Every Day Smoker     Packs/day: 0.50     Types: Cigarettes    Smokeless tobacco: Never Used   Substance and Sexual Activity    Alcohol use: Yes    Drug use: Not on file    Sexual activity: Not on file   Lifestyle    Physical activity:     Days per week: Not on file     Minutes per session: Not on file    Stress: Not on file   Relationships    Social connections:     Talks on phone: Not on file     Gets together: Not on file     Attends Sabianist service: Not on file     Active member of club or organization: Not on file     Attends meetings of clubs or organizations: Not on file     Relationship status: Not on file   Other Topics Concern    Not on file   Social History Narrative    Not on file             ROS:    General ROS: negative for - chills, fatigue or fever  Cardiovascular ROS: no chest pain or dyspnea on exertion  Musculoskeletal ROS: negative for - joint pain or joint stiffness   Skin: Negative for rash, Positive for nail or hair changes.  no itching.       EXAM:   Vitals:    06/25/19 1150   BP: 138/89   BP Location: Right arm   Pulse: 85        General:  alert, no distress    Vasc:    Pedal pulses: DP 2/4 - L and PT 2/4 - L  Capillary Refill Time: Normal - B/L  Temperature: Normal - B/L  Hair Growth: Decreased - B/L  Edema:  Non-pitting, Present bilaterally and Mild      Neuro Motor:   [address] present bilaterally,   Reflexes normal bilaterally,   Tinels absent  Mulders absent      Derm:   Thickened mycotic discolored toenails x 10 with subungual debris   No open wounds  Skin xerotic  No bruising.   No cellulitis.       Msk:    tenderness absent bilaterally, masses absent bilaterally, range of movement non-painful and equal, crepitation absent  bilaterally, strength normal bilaterally and gait normal          ASSESSMENT / PLAN:     Problem List Items Addressed This Visit     None      Visit Diagnoses     Dermatophytosis of nail    -  Primary    Pain of toe of left foot                · I counseled the patient on the patient's conditions, their implications and medical management.     Nail Removal  Date/Time: 6/25/2019 2:30 PM  Performed by: Regina Cardona DPM  Authorized by: Regina Cardona DPM     Consent Done?:  Yes (Written)    Location:  Left foot  Location detail:  Left big toe  Anesthesia:  Local infiltration  Local anesthetic: lidocaine 1% without epinephrine and bupivacaine 0.5% without epinephrine  Preparation:  Skin prepped with Betadine    Amount removed:  Complete  Wedge excision of skin of nail fold: No    Nail bed sutured?: No    Nail matrix removed:  None  Dressing applied:  Antibiotic ointment and dressing applied  Patient tolerance:  Patient tolerated the procedure well with no immediate complications

## 2019-06-29 ENCOUNTER — HOSPITAL ENCOUNTER (EMERGENCY)
Facility: OTHER | Age: 54
Discharge: HOME | End: 2019-06-29
Attending: EMERGENCY MEDICINE
Payer: MEDICARE

## 2019-06-29 VITALS
RESPIRATION RATE: 18 BRPM | HEIGHT: 73 IN | BODY MASS INDEX: 41.75 KG/M2 | WEIGHT: 315 LBS | SYSTOLIC BLOOD PRESSURE: 133 MMHG | OXYGEN SATURATION: 97 % | DIASTOLIC BLOOD PRESSURE: 79 MMHG | TEMPERATURE: 98 F | HEART RATE: 107 BPM

## 2019-06-29 DIAGNOSIS — M79.675 PAIN AROUND TOENAIL, LEFT FOOT: Primary | ICD-10-CM

## 2019-06-29 DIAGNOSIS — B35.1 ONYCHOMYCOSIS OF TOENAIL: ICD-10-CM

## 2019-06-29 PROCEDURE — 90471 IMMUNIZATION ADMIN: CPT | Performed by: EMERGENCY MEDICINE

## 2019-06-29 PROCEDURE — 63600175 PHARM REV CODE 636 W HCPCS: Performed by: EMERGENCY MEDICINE

## 2019-06-29 PROCEDURE — 99282 EMERGENCY DEPT VISIT SF MDM: CPT | Mod: 59

## 2019-06-29 PROCEDURE — 25000003 PHARM REV CODE 250: Performed by: EMERGENCY MEDICINE

## 2019-06-29 PROCEDURE — 90715 TDAP VACCINE 7 YRS/> IM: CPT | Performed by: EMERGENCY MEDICINE

## 2019-06-29 RX ORDER — ACETAMINOPHEN 500 MG
1000 TABLET ORAL
Status: COMPLETED | OUTPATIENT
Start: 2019-06-29 | End: 2019-06-29

## 2019-06-29 RX ORDER — ACETAMINOPHEN 500 MG
500 TABLET ORAL EVERY 6 HOURS PRN
Qty: 30 TABLET | Refills: 0 | Status: SHIPPED | OUTPATIENT
Start: 2019-06-29

## 2019-06-29 RX ADMIN — CLOSTRIDIUM TETANI TOXOID ANTIGEN (FORMALDEHYDE INACTIVATED), CORYNEBACTERIUM DIPHTHERIAE TOXOID ANTIGEN (FORMALDEHYDE INACTIVATED), BORDETELLA PERTUSSIS TOXOID ANTIGEN (GLUTARALDEHYDE INACTIVATED), BORDETELLA PERTUSSIS FILAMENTOUS HEMAGGLUTININ ANTIGEN (FORMALDEHYDE INACTIVATED), BORDETELLA PERTUSSIS PERTACTIN ANTIGEN, AND BORDETELLA PERTUSSIS FIMBRIAE 2/3 ANTIGEN 0.5 ML: 5; 2; 2.5; 5; 3; 5 INJECTION, SUSPENSION INTRAMUSCULAR at 12:06

## 2019-06-29 RX ADMIN — ACETAMINOPHEN 1000 MG: 500 TABLET ORAL at 12:06

## 2019-06-29 RX ADMIN — BACITRACIN ZINC, NEOMYCIN SULFATE, AND POLYMYXIN B SULFATE 1 EACH: 400; 3.5; 5 OINTMENT TOPICAL at 12:06

## 2019-06-29 NOTE — ED NOTES
Two patient identifiers have been checked and are correct.      Appearance: Pt awake, alert & oriented to person, place & time. Pt in no acute distress at present time. Pt is clean and well groomed with clothes appropriately fastened.   Skin: Skin warm, dry & intact. Color consistent with ethnicity. Mucous membranes moist. No breakdown or brusing noted. + left sided great toe apins reported, missing toe nail noted, no drainage noted to site.   Musculoskeletal: Patient moving all extremities well, no obvious swelling or deformities noted.   Respiratory: Respirations spontaneous, even, and non-labored. Visible chest rise noted. Airway is open and patent. No accessory muscle use noted.   Neurologic: Sensation is intact. Speech is clear and appropriate. Eyes open spontaneously, behavior appropriate to situation, follows commands, facial expression symmetrical, bilateral hand grasp equal and even, purposeful motor response noted.  Cardiac: All peripheral pulses present. No Bilateral lower extremity edema. Cap refill is <3 seconds.

## 2019-06-29 NOTE — ED PROVIDER NOTES
Encounter Date: 6/29/2019    SCRIBE #1 NOTE: I, Kelsea Sahu, am scribing for, and in the presence of, Dr. Mitchell .       History     Chief Complaint   Patient presents with    Toe Pain     pt  had toenail removed on tue and now with foot pain. pt denies swelling redness to area.      Time seen by provider: 12:31 PM    This is a 53 y.o. male who presents with complaint of left great toe pain since yesterday. He reports having an ingrown toe nail causing him to see a podiatrist who took the nail off. The patient mentions that he was told to keep the nail clean, use neosporin three times a day and bandage toe nail. Last night the toe became painful. He took ibuprofen however symptoms were unchanged. He denies having a fever, chills or sob. He has no hx of blood clots. The patient is supposed to report back to the podiatrist on July 10th.        The history is provided by the patient.     Review of patient's allergies indicates:  No Known Allergies  Past Medical History:   Diagnosis Date    Benign essential HTN 8/22/2017    Chronic kidney disease, stage III (moderate) 8/22/2017    Internal derangement of left knee 10/9/2017     History reviewed. No pertinent surgical history.  Family History   Problem Relation Age of Onset    Hypertension Mother     Hypertension Father     Hypertension Brother      Social History     Tobacco Use    Smoking status: Current Every Day Smoker     Packs/day: 0.50     Types: Cigarettes    Smokeless tobacco: Never Used   Substance Use Topics    Alcohol use: Yes    Drug use: Not on file     Review of Systems   Constitutional: Negative for chills and fever.   HENT: Negative for congestion, rhinorrhea and sore throat.    Eyes: Negative for visual disturbance.   Respiratory: Negative for cough and shortness of breath.    Cardiovascular: Negative for chest pain.   Gastrointestinal: Negative for abdominal pain, diarrhea, nausea and vomiting.   Genitourinary: Negative for dysuria.    Musculoskeletal: Negative for back pain.   Skin: Negative for rash.        Left great toe pain .   Neurological: Negative for dizziness, weakness and light-headedness.   Psychiatric/Behavioral: Negative for confusion.       Physical Exam     Initial Vitals [06/29/19 1225]   BP Pulse Resp Temp SpO2   133/79 107 18 98.4 °F (36.9 °C) 97 %      MAP       --         Physical Exam    Nursing note and vitals reviewed.  Constitutional: He appears well-developed and well-nourished. He is not diaphoretic. No distress.   Eyes: Right eye exhibits no discharge. Left eye exhibits no discharge.   Neck: Normal range of motion.   Pulmonary/Chest: No respiratory distress.   Musculoskeletal: Normal range of motion.   No calf tenderness.   Neurological: He is alert and oriented to person, place, and time. No sensory deficit.   Skin: Skin is warm and dry. No rash noted. No erythema.   Hypertrophic thickening to all toe nails.  Left great toe with absent of nail- no tenderness along nail bed. Granulation tissue present along nail bed. No purlence at nail folds, no bony tenderness, no warmth, able to fully flex digit.    Psychiatric: He has a normal mood and affect. His behavior is normal.         ED Course   Procedures  Labs Reviewed - No data to display       Imaging Results    None          Medical Decision Making:   Initial Assessment:   Urgent evaluation of 53-year-old gentleman here with complaint of left great toe pain.  Patient had removal of the left toenail performed 4 days previously by Podiatry, has since been applying antibiotic ointment, but notes acute onset of pain last night.  On exam patient has diffuse onychomycosis to all toenails, with absence of the left great toenail with healing granulation tissue present long nail bed. There no evidence of acute topical infection nor paronychia.  Patient has no bony tenderness, proximal calf tenderness, and is able to fully range, and no crepitance to suggest serious deep  infection.  I do not suspect gout, osteomyelitis, or septic arthritis at this time.  Patient was given further wound care education, elevation instructions, follow up Podiatry, and tetanus administered.            Scribe Attestation:   Scribe #1: I performed the above scribed service and the documentation accurately describes the services I performed. I attest to the accuracy of the note.    Attending Attestation:           Physician Attestation for Scribe:  Physician Attestation Statement for Scribe #1: I, Dr. Mitchell , reviewed documentation, as scribed by Kelsea Sahu  in my presence, and it is both accurate and complete.                    Clinical Impression:     1. Pain around toenail, left foot    2. Onychomycosis of toenail            Disposition:   Disposition: Discharged  Condition: Stable                        Shayna Mitchell MD  06/29/19 7298

## 2019-07-16 ENCOUNTER — PATIENT OUTREACH (OUTPATIENT)
Dept: ADMINISTRATIVE | Facility: OTHER | Age: 54
End: 2019-07-16

## 2019-11-12 RX ORDER — OLMESARTAN MEDOXOMIL 40 MG/1
TABLET ORAL
Qty: 90 TABLET | Refills: 0 | OUTPATIENT
Start: 2019-11-12

## 2019-12-10 ENCOUNTER — OFFICE VISIT (OUTPATIENT)
Dept: INTERNAL MEDICINE | Facility: CLINIC | Age: 54
End: 2019-12-10
Attending: INTERNAL MEDICINE
Payer: MEDICARE

## 2019-12-10 VITALS
HEART RATE: 84 BPM | DIASTOLIC BLOOD PRESSURE: 80 MMHG | HEIGHT: 74 IN | WEIGHT: 315 LBS | BODY MASS INDEX: 40.43 KG/M2 | OXYGEN SATURATION: 95 % | SYSTOLIC BLOOD PRESSURE: 114 MMHG

## 2019-12-10 DIAGNOSIS — I10 BENIGN ESSENTIAL HTN: ICD-10-CM

## 2019-12-10 DIAGNOSIS — Z28.21 REFUSED INFLUENZA VACCINE: ICD-10-CM

## 2019-12-10 DIAGNOSIS — N18.30 CHRONIC KIDNEY DISEASE, STAGE III (MODERATE): ICD-10-CM

## 2019-12-10 DIAGNOSIS — Z12.11 COLON CANCER SCREENING: Primary | ICD-10-CM

## 2019-12-10 DIAGNOSIS — R79.9 ABNORMAL FINDING OF BLOOD CHEMISTRY, UNSPECIFIED: ICD-10-CM

## 2019-12-10 PROCEDURE — 99214 OFFICE O/P EST MOD 30 MIN: CPT | Mod: S$PBB,,, | Performed by: INTERNAL MEDICINE

## 2019-12-10 PROCEDURE — 99999 PR PBB SHADOW E&M-EST. PATIENT-LVL III: ICD-10-PCS | Mod: PBBFAC,,, | Performed by: INTERNAL MEDICINE

## 2019-12-10 PROCEDURE — 99999 PR PBB SHADOW E&M-EST. PATIENT-LVL III: CPT | Mod: PBBFAC,,, | Performed by: INTERNAL MEDICINE

## 2019-12-10 PROCEDURE — 99214 PR OFFICE/OUTPT VISIT, EST, LEVL IV, 30-39 MIN: ICD-10-PCS | Mod: S$PBB,,, | Performed by: INTERNAL MEDICINE

## 2019-12-10 PROCEDURE — 99213 OFFICE O/P EST LOW 20 MIN: CPT | Mod: PBBFAC | Performed by: INTERNAL MEDICINE

## 2019-12-10 RX ORDER — SPIRONOLACTONE 25 MG/1
25 TABLET ORAL DAILY
Qty: 90 TABLET | Refills: 1 | Status: SHIPPED | OUTPATIENT
Start: 2019-12-10 | End: 2020-06-08 | Stop reason: SDUPTHER

## 2019-12-10 RX ORDER — HYDRALAZINE HYDROCHLORIDE 50 MG/1
50 TABLET, FILM COATED ORAL EVERY 8 HOURS
Qty: 270 TABLET | Refills: 1 | Status: SHIPPED | OUTPATIENT
Start: 2019-12-10 | End: 2020-06-02 | Stop reason: SDUPTHER

## 2019-12-10 RX ORDER — OLMESARTAN MEDOXOMIL 40 MG/1
40 TABLET ORAL DAILY
Qty: 90 TABLET | Refills: 1 | Status: SHIPPED | OUTPATIENT
Start: 2019-12-10 | End: 2020-06-08 | Stop reason: SDUPTHER

## 2019-12-10 RX ORDER — HYDROCHLOROTHIAZIDE 25 MG/1
25 TABLET ORAL DAILY
Qty: 90 TABLET | Refills: 1 | Status: SHIPPED | OUTPATIENT
Start: 2019-12-10 | End: 2020-06-08 | Stop reason: SDUPTHER

## 2019-12-10 RX ORDER — AMLODIPINE BESYLATE 10 MG/1
10 TABLET ORAL DAILY
Qty: 90 TABLET | Refills: 1 | Status: SHIPPED | OUTPATIENT
Start: 2019-12-10 | End: 2020-06-08 | Stop reason: SDUPTHER

## 2019-12-10 NOTE — PROGRESS NOTES
"Subjective:       Patient ID: Fausto Pandey is a 54 y.o. male.    Chief Complaint: Medication Refill    Here for annual exam  Pt normally cared for by my colleague Dr. Alejandro. I have reviewed patient's past medical, surgical, and social history in addition to MAR and allergies.     Pt would like refills of BP meds. BP controlled on current regimen. Denies frequent or current HA, intermittent blurry vision, dizziness, CP, or SOB.    Refuses colonoscopy. Amenable to FIT kit.           Review of Systems   Constitutional: Negative for activity change and unexpected weight change.   HENT: Negative for hearing loss, rhinorrhea and trouble swallowing.    Eyes: Negative for discharge and visual disturbance.   Respiratory: Negative for chest tightness and wheezing.    Cardiovascular: Negative for chest pain and palpitations.   Gastrointestinal: Negative for blood in stool, constipation, diarrhea and vomiting.   Endocrine: Negative for polydipsia and polyuria.   Genitourinary: Negative for difficulty urinating, hematuria and urgency.   Musculoskeletal: Negative for arthralgias, joint swelling and neck pain.   Neurological: Negative for weakness and headaches.   Psychiatric/Behavioral: Negative for confusion and dysphoric mood.       Objective:      Vitals:    12/10/19 0903   BP: 114/80   Pulse: 84   SpO2: 95%   Weight: (!) 151.7 kg (334 lb 7 oz)   Height: 6' 2" (1.88 m)      Physical Exam   Constitutional: He is oriented to person, place, and time. He appears well-developed and well-nourished. No distress.   HENT:   Head: Normocephalic and atraumatic.   Mouth/Throat: Oropharynx is clear and moist. No oropharyngeal exudate.   Eyes: Pupils are equal, round, and reactive to light. Conjunctivae and EOM are normal. No scleral icterus.   Neck: No thyromegaly present.   Cardiovascular: Normal rate, regular rhythm and normal heart sounds.   No murmur heard.  Pulmonary/Chest: Effort normal and breath sounds normal. He has no " wheezes. He has no rales.   Abdominal: Soft. He exhibits no distension. There is no tenderness.   Musculoskeletal: He exhibits no edema or tenderness.   Lymphadenopathy:     He has no cervical adenopathy.   Neurological: He is alert and oriented to person, place, and time.   Skin: Skin is warm and dry.   Psychiatric: He has a normal mood and affect. His behavior is normal.       Assessment:       1. Colon cancer screening    2. Chronic kidney disease, stage III (moderate)    3. Benign essential HTN    4. Abnormal finding of blood chemistry, unspecified     5. Refused influenza vaccine        Plan:       Fausto was seen today for medication refill.    Diagnoses and all orders for this visit:    Colon cancer screening  -     Fecal Immunochemical Test (iFOBT); Future    Chronic kidney disease, stage III (moderate)  -     Comprehensive metabolic panel; Future    Benign essential HTN   controlled. Continue current regimen  -     Comprehensive metabolic panel; Future  -     Lipid panel; Future  -     TSH; Future  -     CBC auto differential; Future  -     Hemoglobin A1c; Future    Abnormal finding of blood chemistry, unspecified   -     Hemoglobin A1c; Future    Refused influenza vaccine   Despite discussion of r/b    RTC in 6 months c/ PCP or sooner prnahomi Merchant MD  Internal Medicine-[facility]        Side effects of medication(s) were discussed in detail and patient voiced understanding.  Patient will call back for any issues or complications.

## 2019-12-23 ENCOUNTER — PATIENT MESSAGE (OUTPATIENT)
Dept: INTERNAL MEDICINE | Facility: CLINIC | Age: 54
End: 2019-12-23

## 2019-12-23 ENCOUNTER — OFFICE VISIT (OUTPATIENT)
Dept: INTERNAL MEDICINE | Facility: CLINIC | Age: 54
End: 2019-12-23
Payer: MEDICARE

## 2019-12-23 ENCOUNTER — LAB VISIT (OUTPATIENT)
Dept: LAB | Facility: HOSPITAL | Age: 54
End: 2019-12-23
Attending: FAMILY MEDICINE
Payer: MEDICARE

## 2019-12-23 VITALS
DIASTOLIC BLOOD PRESSURE: 70 MMHG | WEIGHT: 315 LBS | BODY MASS INDEX: 43.65 KG/M2 | SYSTOLIC BLOOD PRESSURE: 100 MMHG | OXYGEN SATURATION: 97 % | HEART RATE: 91 BPM

## 2019-12-23 DIAGNOSIS — Z11.4 ENCOUNTER FOR SCREENING FOR HUMAN IMMUNODEFICIENCY VIRUS (HIV): ICD-10-CM

## 2019-12-23 DIAGNOSIS — Z20.2 EXPOSURE TO STD: Primary | ICD-10-CM

## 2019-12-23 DIAGNOSIS — N53.9 UNSPECIFIED MALE SEXUAL DYSFUNCTION: ICD-10-CM

## 2019-12-23 DIAGNOSIS — Z20.2 EXPOSURE TO STD: ICD-10-CM

## 2019-12-23 PROCEDURE — 87661 TRICHOMONAS VAGINALIS AMPLIF: CPT

## 2019-12-23 PROCEDURE — 99214 OFFICE O/P EST MOD 30 MIN: CPT | Mod: S$PBB,,, | Performed by: FAMILY MEDICINE

## 2019-12-23 PROCEDURE — 36415 COLL VENOUS BLD VENIPUNCTURE: CPT

## 2019-12-23 PROCEDURE — 99999 PR PBB SHADOW E&M-EST. PATIENT-LVL III: ICD-10-PCS | Mod: PBBFAC,,, | Performed by: FAMILY MEDICINE

## 2019-12-23 PROCEDURE — 86703 HIV-1/HIV-2 1 RESULT ANTBDY: CPT

## 2019-12-23 PROCEDURE — 99214 PR OFFICE/OUTPT VISIT, EST, LEVL IV, 30-39 MIN: ICD-10-PCS | Mod: S$PBB,,, | Performed by: FAMILY MEDICINE

## 2019-12-23 PROCEDURE — 99213 OFFICE O/P EST LOW 20 MIN: CPT | Mod: PBBFAC | Performed by: FAMILY MEDICINE

## 2019-12-23 PROCEDURE — 86592 SYPHILIS TEST NON-TREP QUAL: CPT

## 2019-12-23 PROCEDURE — 99999 PR PBB SHADOW E&M-EST. PATIENT-LVL III: CPT | Mod: PBBFAC,,, | Performed by: FAMILY MEDICINE

## 2019-12-23 RX ORDER — METRONIDAZOLE 500 MG/1
500 TABLET ORAL 2 TIMES DAILY WITH MEALS
Qty: 14 TABLET | Refills: 0 | Status: SHIPPED | OUTPATIENT
Start: 2019-12-23 | End: 2019-12-30

## 2019-12-23 NOTE — PROGRESS NOTES
Subjective:       Patient ID: Fausto Pandey is a 54 y.o. male.    Chief Complaint: No chief complaint on file.    HPI    54yoM with PMHx HTN, CKD for same day visit. PCP Dr. Lynn. LOV 12/10/19 with Dr. Nesbitt for annual exam.    Patient reports exposure to trichmonas. Wife recently dx with trichmonas. Patient denies history of STDs himself, no recent STD labs however. Denies symptoms incl rash, itching, dysuria, abnormal discharge, hematuria, diarrhea, constipation, fever, chills, abd pain, n/v. Ok for labwork today.    Review of Systems   Constitutional: Negative for fever.   Gastrointestinal: Negative for abdominal pain.   Genitourinary: Negative for decreased urine volume, difficulty urinating, discharge, dysuria, flank pain, frequency, hematuria, penile pain, testicular pain and urgency.   Neurological: Negative for weakness.         Past Medical History:   Diagnosis Date    Benign essential HTN 8/22/2017    Chronic kidney disease, stage III (moderate) 8/22/2017    Internal derangement of left knee 10/9/2017        Prior to Admission medications    Medication Sig Start Date End Date Taking? Authorizing Provider   acetaminophen (TYLENOL) 500 MG tablet Take 1 tablet (500 mg total) by mouth every 6 (six) hours as needed for Pain. 6/29/19   Shayna Mitchell MD   amLODIPine (NORVASC) 10 MG tablet Take 1 tablet (10 mg total) by mouth once daily. 12/10/19   Alvaro Nesbitt MD   cetirizine (ZYRTEC) 10 MG tablet Take 1 tablet (10 mg total) by mouth 2 (two) times daily. 12/13/18   Enio Sanchez MD   hydrALAZINE (APRESOLINE) 50 MG tablet Take 1 tablet (50 mg total) by mouth every 8 (eight) hours. 12/10/19   Alvaro Nesbitt MD   hydroCHLOROthiazide (HYDRODIURIL) 25 MG tablet Take 1 tablet (25 mg total) by mouth once daily. 12/10/19   Alvaro Nesbitt MD   olmesartan (BENICAR) 40 MG tablet Take 1 tablet (40 mg total) by mouth once daily. 12/10/19 12/9/20  Alvaro Nesbitt MD   spironolactone  (ALDACTONE) 25 MG tablet Take 1 tablet (25 mg total) by mouth once daily. 12/10/19   Alvaro Nesbitt MD   tiZANidine (ZANAFLEX) 4 MG tablet TAKE 1 TABLET BY MOUTH EVERY 8 HOURS AS NEEDED 1/16/19   Krys Hawkins PA-C        Past medical history, surgical history, and family medical history reviewed and updated as appropriate.    Medications and allergies reviewed.     Objective:          Vitals:    12/23/19 1617   BP: 100/70   Pulse: 91   SpO2: 97%   Weight: (!) 154.2 kg (339 lb 15.2 oz)     Body mass index is 43.65 kg/m².  Physical Exam   Constitutional: He is oriented to person, place, and time. He appears well-developed and well-nourished.   HENT:   Head: Normocephalic and atraumatic.   Eyes: EOM are normal.   Neck: Normal range of motion.   Cardiovascular: Normal rate.   Pulmonary/Chest: No respiratory distress.   Neurological: He is alert and oriented to person, place, and time.   Psychiatric: He has a normal mood and affect. His behavior is normal.       Lab Results   Component Value Date    WBC 6.70 08/22/2017    HGB 14.3 08/22/2017    HCT 41.9 08/22/2017     08/22/2017    ALT 18 10/18/2017    AST 22 10/18/2017     10/18/2017    K 4.3 10/18/2017     10/18/2017    CREATININE 2.4 (H) 10/18/2017    BUN 31 (H) 10/18/2017    CO2 28 10/18/2017    HGBA1C 4.8 08/22/2017       Assessment:       1. Exposure to STD    2. Encounter for screening for human immunodeficiency virus (HIV)     3. Unspecified male sexual dysfunction         Plan:   Diagnoses and all orders for this visit:    Labs today and f/u results. Will test blood and urine for std panel given exposure. Unsure of wife's history. Will empirically treat with flagyl for 1 week. F/u as needed.    Exposure to STD  -     metroNIDAZOLE (FLAGYL) 500 MG tablet; Take 1 tablet (500 mg total) by mouth 2 (two) times daily with meals. Caution. It is very important that you do not drink any type of alcohol while using this medication. for 7  days  -     HIV 1/2 Ag/Ab (4th Gen); Future  -     RPR; Future  -     C. trachomatis/N. gonorrhoeae by AMP DNA [facility]; Urine  -     Trichomonas vaginalis, RNA, Qual, Urine    Encounter for screening for human immunodeficiency virus (HIV)   -     HIV 1/2 Ag/Ab (4th Gen); Future  -     C. trachomatis/N. gonorrhoeae by AMP DNA [facility]; Urine    Unspecified male sexual dysfunction   -     C. trachomatis/N. gonorrhoeae by AMP DNA [facility]; Urine    No follow-ups on file.    Hema Mayes MD  Family Medicine  [facility]  12/23/2019

## 2019-12-24 ENCOUNTER — TELEPHONE (OUTPATIENT)
Dept: INTERNAL MEDICINE | Facility: CLINIC | Age: 54
End: 2019-12-24

## 2019-12-24 DIAGNOSIS — Z20.2 EXPOSURE TO STD: Primary | ICD-10-CM

## 2019-12-24 DIAGNOSIS — N53.9 UNSPECIFIED MALE SEXUAL DYSFUNCTION: ICD-10-CM

## 2019-12-24 LAB
HIV 1+2 AB+HIV1 P24 AG SERPL QL IA: NEGATIVE
RPR SER QL: NORMAL

## 2019-12-24 NOTE — TELEPHONE ENCOUNTER
Correction to last note I made, pt needs to do a home collect URINE sample the lab could not process the specimen given.

## 2019-12-24 NOTE — TELEPHONE ENCOUNTER
----- Message from Colton GIANNA Kassi sent at 12/24/2019  8:53 AM CST -----  Regarding: Lab Client Services  Contact: [phone]  Good Morning,    My name is Colton Solitario I work in the Lab Client Services. We had a problem with some lab work on this patient. If someone from your office could call us at [phone] or ext. [phone] that would be great. Anyone in my department can help.      Thank you

## 2019-12-27 LAB
T VAGINALIS RRNA SPEC QL NAA+PROBE: DETECTED
TRICHOMONAS VAGINALIS RNA, QUAL, SOURCE: ABNORMAL

## 2020-06-03 RX ORDER — HYDRALAZINE HYDROCHLORIDE 50 MG/1
50 TABLET, FILM COATED ORAL EVERY 8 HOURS
Qty: 270 TABLET | Refills: 1 | Status: SHIPPED | OUTPATIENT
Start: 2020-06-03 | End: 2020-06-08 | Stop reason: SDUPTHER

## 2020-06-08 ENCOUNTER — TELEPHONE (OUTPATIENT)
Dept: FAMILY MEDICINE | Facility: CLINIC | Age: 55
End: 2020-06-08

## 2020-06-08 DIAGNOSIS — I10 BENIGN ESSENTIAL HTN: ICD-10-CM

## 2020-06-08 RX ORDER — HYDROCHLOROTHIAZIDE 25 MG/1
25 TABLET ORAL DAILY
Qty: 30 TABLET | Refills: 0 | Status: SHIPPED | OUTPATIENT
Start: 2020-06-08 | End: 2021-02-21

## 2020-06-08 RX ORDER — HYDRALAZINE HYDROCHLORIDE 50 MG/1
50 TABLET, FILM COATED ORAL EVERY 8 HOURS
Qty: 90 TABLET | Refills: 0 | Status: SHIPPED | OUTPATIENT
Start: 2020-06-08 | End: 2020-10-20 | Stop reason: SDUPTHER

## 2020-06-08 RX ORDER — OLMESARTAN MEDOXOMIL 40 MG/1
40 TABLET ORAL DAILY
Qty: 30 TABLET | Refills: 0 | Status: SHIPPED | OUTPATIENT
Start: 2020-06-08 | End: 2020-09-28 | Stop reason: SDUPTHER

## 2020-06-08 RX ORDER — AMLODIPINE BESYLATE 10 MG/1
10 TABLET ORAL DAILY
Qty: 30 TABLET | Refills: 0 | Status: SHIPPED | OUTPATIENT
Start: 2020-06-08 | End: 2020-10-20 | Stop reason: SDUPTHER

## 2020-06-08 RX ORDER — SPIRONOLACTONE 25 MG/1
25 TABLET ORAL DAILY
Qty: 30 TABLET | Refills: 0 | Status: SHIPPED | OUTPATIENT
Start: 2020-06-08 | End: 2020-09-28 | Stop reason: SDUPTHER

## 2020-06-08 RX ORDER — OLMESARTAN MEDOXOMIL 40 MG/1
40 TABLET ORAL DAILY
Qty: 90 TABLET | Refills: 1 | OUTPATIENT
Start: 2020-06-08 | End: 2021-06-08

## 2020-06-08 NOTE — TELEPHONE ENCOUNTER
----- Message from Elena Mo sent at 6/8/2020  2:26 PM CDT -----  Contact: Pt  Pt was contacted for an appointment he stated that Dr. Montes is his new Pcp.

## 2020-06-08 NOTE — TELEPHONE ENCOUNTER
----- Message from Brisa Lynn MD sent at 6/8/2020  1:06 PM CDT -----  Please have pt see dr perry he has not been seen in 2 years

## 2020-07-31 ENCOUNTER — PES CALL (OUTPATIENT)
Dept: ADMINISTRATIVE | Facility: CLINIC | Age: 55
End: 2020-07-31

## 2020-09-28 ENCOUNTER — OFFICE VISIT (OUTPATIENT)
Dept: INTERNAL MEDICINE | Facility: CLINIC | Age: 55
End: 2020-09-28
Attending: INTERNAL MEDICINE
Payer: MEDICARE

## 2020-09-28 DIAGNOSIS — Z91.199 FAILURE TO ATTEND APPOINTMENT: Primary | ICD-10-CM

## 2020-09-28 PROCEDURE — 99499 NO LOS: ICD-10-PCS | Mod: 95,,, | Performed by: INTERNAL MEDICINE

## 2020-09-28 PROCEDURE — 99499 UNLISTED E&M SERVICE: CPT | Mod: 95,,, | Performed by: INTERNAL MEDICINE

## 2020-09-29 ENCOUNTER — PATIENT MESSAGE (OUTPATIENT)
Dept: OTHER | Facility: OTHER | Age: 55
End: 2020-09-29

## 2020-10-20 ENCOUNTER — OFFICE VISIT (OUTPATIENT)
Dept: INTERNAL MEDICINE | Facility: CLINIC | Age: 55
End: 2020-10-20
Attending: INTERNAL MEDICINE
Payer: MEDICARE

## 2020-10-20 ENCOUNTER — LAB VISIT (OUTPATIENT)
Dept: LAB | Facility: OTHER | Age: 55
End: 2020-10-20
Attending: INTERNAL MEDICINE
Payer: MEDICARE

## 2020-10-20 VITALS
BODY MASS INDEX: 40.43 KG/M2 | SYSTOLIC BLOOD PRESSURE: 120 MMHG | DIASTOLIC BLOOD PRESSURE: 74 MMHG | WEIGHT: 315 LBS | HEART RATE: 86 BPM | OXYGEN SATURATION: 95 % | HEIGHT: 74 IN

## 2020-10-20 DIAGNOSIS — Z12.11 COLON CANCER SCREENING: ICD-10-CM

## 2020-10-20 DIAGNOSIS — I10 BENIGN ESSENTIAL HTN: ICD-10-CM

## 2020-10-20 DIAGNOSIS — R79.9 ABNORMAL FINDING OF BLOOD CHEMISTRY, UNSPECIFIED: ICD-10-CM

## 2020-10-20 DIAGNOSIS — N18.32 STAGE 3B CHRONIC KIDNEY DISEASE: ICD-10-CM

## 2020-10-20 DIAGNOSIS — Z11.59 NEED FOR HEPATITIS C SCREENING TEST: ICD-10-CM

## 2020-10-20 DIAGNOSIS — R23.4 CHANGES IN SKIN TEXTURE: ICD-10-CM

## 2020-10-20 DIAGNOSIS — Z00.00 ANNUAL PHYSICAL EXAM: ICD-10-CM

## 2020-10-20 DIAGNOSIS — Z00.00 ANNUAL PHYSICAL EXAM: Primary | ICD-10-CM

## 2020-10-20 LAB
ALBUMIN SERPL BCP-MCNC: 3.8 G/DL (ref 3.5–5.2)
ALP SERPL-CCNC: 70 U/L (ref 55–135)
ALT SERPL W/O P-5'-P-CCNC: 19 U/L (ref 10–44)
ANION GAP SERPL CALC-SCNC: 13 MMOL/L (ref 8–16)
AST SERPL-CCNC: 22 U/L (ref 10–40)
BASOPHILS # BLD AUTO: 0.05 K/UL (ref 0–0.2)
BASOPHILS NFR BLD: 0.6 % (ref 0–1.9)
BILIRUB SERPL-MCNC: 0.3 MG/DL (ref 0.1–1)
BUN SERPL-MCNC: 28 MG/DL (ref 6–20)
CALCIUM SERPL-MCNC: 9.1 MG/DL (ref 8.7–10.5)
CHLORIDE SERPL-SCNC: 105 MMOL/L (ref 95–110)
CHOLEST SERPL-MCNC: 227 MG/DL (ref 120–199)
CHOLEST/HDLC SERPL: 9.1 {RATIO} (ref 2–5)
CO2 SERPL-SCNC: 22 MMOL/L (ref 23–29)
CREAT SERPL-MCNC: 2.7 MG/DL (ref 0.5–1.4)
DIFFERENTIAL METHOD: ABNORMAL
EOSINOPHIL # BLD AUTO: 0.7 K/UL (ref 0–0.5)
EOSINOPHIL NFR BLD: 9.3 % (ref 0–8)
ERYTHROCYTE [DISTWIDTH] IN BLOOD BY AUTOMATED COUNT: 12.9 % (ref 11.5–14.5)
EST. GFR  (AFRICAN AMERICAN): 30 ML/MIN/1.73 M^2
EST. GFR  (NON AFRICAN AMERICAN): 26 ML/MIN/1.73 M^2
GLUCOSE SERPL-MCNC: 92 MG/DL (ref 70–110)
HCT VFR BLD AUTO: 39.7 % (ref 40–54)
HDLC SERPL-MCNC: 25 MG/DL (ref 40–75)
HDLC SERPL: 11 % (ref 20–50)
HGB BLD-MCNC: 13.1 G/DL (ref 14–18)
IMM GRANULOCYTES # BLD AUTO: 0.01 K/UL (ref 0–0.04)
IMM GRANULOCYTES NFR BLD AUTO: 0.1 % (ref 0–0.5)
LDLC SERPL CALC-MCNC: ABNORMAL MG/DL (ref 63–159)
LYMPHOCYTES # BLD AUTO: 3.3 K/UL (ref 1–4.8)
LYMPHOCYTES NFR BLD: 42.4 % (ref 18–48)
MCH RBC QN AUTO: 33.6 PG (ref 27–31)
MCHC RBC AUTO-ENTMCNC: 33 G/DL (ref 32–36)
MCV RBC AUTO: 102 FL (ref 82–98)
MONOCYTES # BLD AUTO: 0.7 K/UL (ref 0.3–1)
MONOCYTES NFR BLD: 8.9 % (ref 4–15)
NEUTROPHILS # BLD AUTO: 3.1 K/UL (ref 1.8–7.7)
NEUTROPHILS NFR BLD: 38.7 % (ref 38–73)
NONHDLC SERPL-MCNC: 202 MG/DL
NRBC BLD-RTO: 0 /100 WBC
PLATELET # BLD AUTO: 228 K/UL (ref 150–350)
PMV BLD AUTO: 11 FL (ref 9.2–12.9)
POTASSIUM SERPL-SCNC: 4.5 MMOL/L (ref 3.5–5.1)
PROT SERPL-MCNC: 7.8 G/DL (ref 6–8.4)
RBC # BLD AUTO: 3.9 M/UL (ref 4.6–6.2)
SODIUM SERPL-SCNC: 140 MMOL/L (ref 136–145)
TRIGL SERPL-MCNC: 623 MG/DL (ref 30–150)
TSH SERPL DL<=0.005 MIU/L-ACNC: 1.71 UIU/ML (ref 0.4–4)
WBC # BLD AUTO: 7.88 K/UL (ref 3.9–12.7)

## 2020-10-20 PROCEDURE — 80053 COMPREHEN METABOLIC PANEL: CPT

## 2020-10-20 PROCEDURE — 99213 OFFICE O/P EST LOW 20 MIN: CPT | Mod: PBBFAC | Performed by: INTERNAL MEDICINE

## 2020-10-20 PROCEDURE — 99999 PR PBB SHADOW E&M-EST. PATIENT-LVL III: ICD-10-PCS | Mod: PBBFAC,,, | Performed by: INTERNAL MEDICINE

## 2020-10-20 PROCEDURE — 86803 HEPATITIS C AB TEST: CPT

## 2020-10-20 PROCEDURE — 99214 PR OFFICE/OUTPT VISIT, EST, LEVL IV, 30-39 MIN: ICD-10-PCS | Mod: S$PBB,,, | Performed by: INTERNAL MEDICINE

## 2020-10-20 PROCEDURE — 80061 LIPID PANEL: CPT

## 2020-10-20 PROCEDURE — 99999 PR PBB SHADOW E&M-EST. PATIENT-LVL III: CPT | Mod: PBBFAC,,, | Performed by: INTERNAL MEDICINE

## 2020-10-20 PROCEDURE — 84443 ASSAY THYROID STIM HORMONE: CPT

## 2020-10-20 PROCEDURE — 36415 COLL VENOUS BLD VENIPUNCTURE: CPT

## 2020-10-20 PROCEDURE — 85025 COMPLETE CBC W/AUTO DIFF WBC: CPT

## 2020-10-20 PROCEDURE — 83036 HEMOGLOBIN GLYCOSYLATED A1C: CPT

## 2020-10-20 PROCEDURE — 99214 OFFICE O/P EST MOD 30 MIN: CPT | Mod: S$PBB,,, | Performed by: INTERNAL MEDICINE

## 2020-10-20 RX ORDER — METOPROLOL SUCCINATE 25 MG/1
25 TABLET, EXTENDED RELEASE ORAL DAILY
Qty: 90 TABLET | Refills: 1 | Status: SHIPPED | OUTPATIENT
Start: 2020-10-20 | End: 2021-04-27 | Stop reason: SDUPTHER

## 2020-10-20 RX ORDER — HYDROCHLOROTHIAZIDE 25 MG/1
25 TABLET ORAL DAILY
Qty: 30 TABLET | Refills: 0 | Status: CANCELLED | OUTPATIENT
Start: 2020-10-20

## 2020-10-20 RX ORDER — AMLODIPINE BESYLATE 10 MG/1
10 TABLET ORAL DAILY
Qty: 90 TABLET | Refills: 2 | Status: SHIPPED | OUTPATIENT
Start: 2020-10-20 | End: 2021-04-27 | Stop reason: SDUPTHER

## 2020-10-20 RX ORDER — HYDRALAZINE HYDROCHLORIDE 50 MG/1
50 TABLET, FILM COATED ORAL EVERY 8 HOURS
Qty: 270 TABLET | Refills: 1 | Status: SHIPPED | OUTPATIENT
Start: 2020-10-20 | End: 2021-04-27 | Stop reason: SDUPTHER

## 2020-10-20 RX ORDER — OLMESARTAN MEDOXOMIL 40 MG/1
40 TABLET ORAL DAILY
Qty: 90 TABLET | Refills: 2 | Status: SHIPPED | OUTPATIENT
Start: 2020-10-20 | End: 2021-04-27 | Stop reason: SDUPTHER

## 2020-10-20 NOTE — PROGRESS NOTES
"Subjective:       Patient ID: Fausto Pandey is a 55 y.o. male.    Chief Complaint: Medication Refill    Here for annual exam    Needs refills of meds. BP controlled today. He is on HCTZ with last GFR of 35. Has not seen nephrology since 2017. BMI 43.79. Continues to smoke cigarettes.       Review of Systems   Constitutional: Negative for activity change and unexpected weight change.   HENT: Negative for hearing loss, rhinorrhea and trouble swallowing.    Eyes: Negative for discharge and visual disturbance.   Respiratory: Negative for chest tightness and wheezing.    Cardiovascular: Negative for chest pain and palpitations.   Gastrointestinal: Negative for blood in stool, constipation, diarrhea and vomiting.   Endocrine: Negative for polydipsia and polyuria.   Genitourinary: Negative for difficulty urinating, hematuria and urgency.   Musculoskeletal: Positive for arthralgias. Negative for joint swelling and neck pain.   Neurological: Negative for weakness and headaches.   Psychiatric/Behavioral: Negative for confusion and dysphoric mood.       Objective:      Vitals:    10/20/20 1350   BP: 120/74   Pulse: 86   SpO2: 95%   Weight: (!) 154.7 kg (341 lb 0.8 oz)   Height: 6' 2" (1.88 m)      Physical Exam  Constitutional:       General: He is not in acute distress.     Appearance: He is well-developed.   HENT:      Head: Normocephalic and atraumatic.      Mouth/Throat:      Pharynx: No oropharyngeal exudate.   Eyes:      General: No scleral icterus.     Conjunctiva/sclera: Conjunctivae normal.      Pupils: Pupils are equal, round, and reactive to light.   Neck:      Thyroid: No thyromegaly.   Cardiovascular:      Rate and Rhythm: Normal rate and regular rhythm.      Heart sounds: Normal heart sounds. No murmur.   Pulmonary:      Effort: Pulmonary effort is normal.      Breath sounds: Normal breath sounds. No wheezing or rales.   Abdominal:      General: There is no distension.      Palpations: Abdomen is soft.     "  Tenderness: There is no abdominal tenderness.   Musculoskeletal:         General: No tenderness.   Lymphadenopathy:      Cervical: No cervical adenopathy.   Skin:     General: Skin is warm and dry.   Neurological:      Mental Status: He is alert and oriented to person, place, and time.   Psychiatric:         Behavior: Behavior normal.         Assessment:       1. Annual physical exam    2. Benign essential HTN    3. Stage 3b chronic kidney disease    4. Need for hepatitis C screening test    5. Abnormal finding of blood chemistry, unspecified     6. Changes in skin texture         Plan:       Fausto was seen today for medication refill.    Diagnoses and all orders for this visit:    Annual physical exam  -     Comprehensive Metabolic Panel; Future  -     Lipid Panel; Future  -     TSH; Future  -     CBC auto differential; Future  -     Hemoglobin A1C; Future    Benign essential HTN  -     CONTINUE  amLODIPine (NORVASC) 10 MG tablet; Take 1 tablet (10 mg total) by mouth once daily.  -     CONTINUE  olmesartan (BENICAR) 40 MG tablet; Take 1 tablet (40 mg total) by mouth once daily.  -    CONTINUE  hydrALAZINE (APRESOLINE) 50 MG tablet; Take 1 tablet (50 mg total) by mouth every 8 (eight) hours.  -     START metoprolol succinate (TOPROL-XL) 25 MG 24 hr tablet; Take 1 tablet (25 mg total) by mouth once daily.    Stage 3b chronic kidney disease   Stop smoking, stop HCTZ. Low salt diet, weight loss and f/u nephrology  -     Ambulatory referral/consult to Nephrology; Future    Need for hepatitis C screening test  -     Hepatitis C Antibody; Future    Colon Can screening  FIT kit ordered    RTC in 3 months or sooner gale Merchant MD  Internal Medicine-[facility]        Side effects of medication(s) were discussed in detail and patient voiced understanding.  Patient will call back for any issues or complications.

## 2020-10-21 LAB
ESTIMATED AVG GLUCOSE: 94 MG/DL (ref 68–131)
HBA1C MFR BLD HPLC: 4.9 % (ref 4–5.6)
HCV AB SERPL QL IA: NEGATIVE

## 2020-10-25 ENCOUNTER — PATIENT OUTREACH (OUTPATIENT)
Dept: ADMINISTRATIVE | Facility: OTHER | Age: 55
End: 2020-10-25

## 2020-10-26 ENCOUNTER — OFFICE VISIT (OUTPATIENT)
Dept: NEPHROLOGY | Facility: CLINIC | Age: 55
End: 2020-10-26
Payer: MEDICARE

## 2020-10-26 VITALS
HEIGHT: 74 IN | SYSTOLIC BLOOD PRESSURE: 118 MMHG | DIASTOLIC BLOOD PRESSURE: 78 MMHG | BODY MASS INDEX: 40.43 KG/M2 | OXYGEN SATURATION: 98 % | WEIGHT: 315 LBS | HEART RATE: 92 BPM

## 2020-10-26 DIAGNOSIS — R80.9 PROTEINURIA, UNSPECIFIED TYPE: ICD-10-CM

## 2020-10-26 DIAGNOSIS — I10 BENIGN ESSENTIAL HTN: ICD-10-CM

## 2020-10-26 DIAGNOSIS — E21.3 HYPERPARATHYROIDISM: ICD-10-CM

## 2020-10-26 DIAGNOSIS — N18.32 STAGE 3B CHRONIC KIDNEY DISEASE: Primary | ICD-10-CM

## 2020-10-26 PROCEDURE — 99204 PR OFFICE/OUTPT VISIT, NEW, LEVL IV, 45-59 MIN: ICD-10-PCS | Mod: S$PBB,,, | Performed by: NURSE PRACTITIONER

## 2020-10-26 PROCEDURE — 99214 OFFICE O/P EST MOD 30 MIN: CPT | Mod: PBBFAC | Performed by: NURSE PRACTITIONER

## 2020-10-26 PROCEDURE — 99204 OFFICE O/P NEW MOD 45 MIN: CPT | Mod: S$PBB,,, | Performed by: NURSE PRACTITIONER

## 2020-10-26 PROCEDURE — 99999 PR PBB SHADOW E&M-EST. PATIENT-LVL IV: ICD-10-PCS | Mod: PBBFAC,,, | Performed by: NURSE PRACTITIONER

## 2020-10-26 PROCEDURE — 99999 PR PBB SHADOW E&M-EST. PATIENT-LVL IV: CPT | Mod: PBBFAC,,, | Performed by: NURSE PRACTITIONER

## 2020-10-26 RX ORDER — ATORVASTATIN CALCIUM 80 MG/1
80 TABLET, FILM COATED ORAL DAILY
Qty: 90 TABLET | Refills: 3 | Status: SHIPPED | OUTPATIENT
Start: 2020-10-26 | End: 2021-10-14 | Stop reason: SDUPTHER

## 2020-10-26 NOTE — LETTER
October 26, 2020      Alvaro Nesbitt MD  [address]  [address]  [facility] [address]           [facility] - Nephrology 5th Fl  [address]  [facility] [address]  Phone: [phone]  Fax: [phone]          Patient: Fausto Pandey   MR Number: [id number]   YOB: 1965   Date of Visit: 10/26/2020       Dear Dr. Alvaro Nesbitt:    Thank you for referring Fausto Pandey to me for evaluation. Attached you will find relevant portions of my assessment and plan of care.    If you have questions, please do not hesitate to call me. I look forward to following Fausto Pandey along with you.    Sincerely,    Roxanna Ibarra, NIRU    Enclosure  CC:  No Recipients    If you would like to receive this communication electronically, please contact [email or web address] or [phone] to request more information on [employer] access.    For providers and/or their staff who would like to refer a patient to [facility], please contact us through our one-stop-shop provider referral line, [facility], at [phone].    If you feel you have received this communication in error or would no longer like to receive these types of communications, please e-mail [email or web address]

## 2020-10-26 NOTE — Clinical Note
Jordi Nesbitt,  Thank you for your referral of Mr. Pandey back to the nephrology dept. Mr. Pandey told me he already messaged you about his cholesterol results.  I will defer to you for treatment of this, but I would like to recommend you avoid fenofibrate, as it has been associated with increased sCr.  Thanks,  Roxanna

## 2020-10-26 NOTE — PATIENT INSTRUCTIONS
Urine tests today  Continue low sodium diet.  Avoid NSAID (ibuprofen, advil, motrin, aleve, naprosyn, naproxen, Stanback, Goody's Powder). Tylenol is okay.  Avoid bactrim/ IV contrast/ gadolinium/ aminoglycoside where possible.  Avoid herbal supplements.  Stay hydrated.  Return to clinic in 3 months with labs (bloodwork and urine) or sooner if needed.  Go to the ER with any emergency concerns.        Chronic Kidney Disease (CKD)     The role of the kidneys is to remove waste products and extra water from the blood.  When the kidneys do not work as they should, waste products begin to build up in the blood. This is called chronic kidney disease (CKD). CKD means that you have kidney damage or a decrease in kidney function lasting at least 3 months. CKD allows extra water, waste, and toxins to build up in the body. This can eventually become life-threatening. You might need dialysis or a kidney transplant to stay alive. This most severe form is called end stage renal disease.  Diabetes is the leading causes of chronic renal failure. Other causes include high blood pressure, hardening of the arteries (atherosclerosis), lupus, inflammation of the blood vessels (vasculitis), and past viral or bacterial infections. Certain over-the-counter pain medicines can cause renal failure when taken often over a long period of time. These include aspirin, ibuprofen, and related anti-inflammatory medicines called NSAIDs (nonsteroidal anti-inflammatory drugs).  Home care  The following guidelines will help you care for yourself at home:  · If you have diabetes, talk with your healthcare provider about keeping your blood sugar under control. Ask if you need to make and changes to your diet, lifestyle, or medicines.  · If you have high blood pressure:  ¨ Take prescribed medicine to lower your blood pressure to the recommended goal of less than 130/80.  ¨ Start a regular exercise program that you enjoy. Check with your healthcare provider  to be sure your planned exercise program is right for you.  ¨ Eat less salt (sodium). Your healthcare provider can tell you how much salt per day is safe for you.  · If you are overweight, talk with your healthcare provider about a weight loss plan.  · If you smoke, you must quit. Smoking makes kidney disease worse. Talk with your healthcare provider about ways to help you quit.  For more information, visit the following links:  ¨ www.[employer].gov/sites/default/files/pdf/clearing-the-air-accessible.pdf  ¨ www.smokefree.gov  ¨ www.cancer.org/healthy/stayawayfromtobacco/guidetoquittingsmoking/  · Most people with CKD need to follow a special diet.  Be sure you understand yours. In general, you will need to limit protein, salt, potassium, and phosphorus. You also need to limit how much fluid you drink.   · CKD is a risk factor for heart disease. Talk with your healthcare provider about any other risk factors you might have and what you can do to lessen them.  · Talk with your healthcare provider about any medicines you are taking to find out if they need to be reduced or stopped.  · Don't use the following over-the-counter medicines, or consult your healthcare provider before using:  ¨ Aspirin and NSAIDs such as ibuprofen or naproxen. Using acetaminophen for fever or pain is OK.  ¨ Laxatives and antacids containing magnesium or aluminum  ¨ Fleet or phospho soda enemas containing phosphorus  ¨ Certain stomach acid-blocking medicine such as cimetidine or ranitidine   ¨ Decongestants containing pseudoephedrine   ¨ Herbal supplements  Follow-up care  Follow up with your healthcare provider, or as advised. Contact one of the following for more information:  · American Association of Kidney Patients 159-849-3127 www.aakp.org  · National Kidney Foundation 557-020-7605 www.kidney.org  · American Kidney Fund 705-537-4691 www.kidneyfund.org  · National Kidney Disease Education Program 866-4KIDNEY www.nkdep.nih.gov  If an X-ray,  ECG (cardiogram), or other diagnostic test was taken, you will be told of any new findings that may affect your care.  Call 911  Call 911 if you have any of the following:  · Severe weakness, dizziness, fainting, drowsiness, or confusion  · Chest pain or shortness of breath  · Heart beating fast, slow, or irregularly  When to seek medical advice  Call your healthcare provider right away if any of these occur:  · Nausea or vomiting  · Fever of 100.4°F (38°C) or higher, or as directed by your healthcare provider  · Unexpected weight gain or swelling in the legs, ankles, or around the eyes  · Decrease or absent urine output  Date Last Reviewed: 9/1/2016  © 0021-9153 [employer]. [address] [place] [address], [place] [address]. All rights reserved. This information is not intended as a substitute for professional medical care. Always follow your healthcare professional's instructions.

## 2020-10-26 NOTE — PROGRESS NOTES
"Subjective:       Patient ID: Fausto Pandey is a 55 y.o. AA male who presents for new evaluation of   No chief complaint on file.      HPI     Patient is new to me. Previously seen by Dr. Barger in Sept 2017. Lost to f/u and is returning today as a pt.  Prior pertinent chart reviewed since this is patient's first appointment with me.    Patient presents for new evaluation of CKD.  Baseline creatinine difficult to determine due to infrequency of available labs. sCr was 2.4 in 2017; now is 2.7 mg/dL.    Significant hx includes HTN.    Social history: smokes ~4 cigarettes daily; no ETOH    The patient denies taking NSAIDs, herbal supplements, or new antibiotics, recent episode of dehydration, diarrhea, nausea or vomiting, acute illness, hospitalization or exposure to IV radiocontrast. Uses marijuana.    Significant family hx includes: HTN; no known renal disease    Last renal doppler US: August 2017, reviewed.    Review of Systems   Constitutional: Negative for fatigue.   Respiratory: Negative for shortness of breath.    Cardiovascular: Negative for chest pain, palpitations and leg swelling.   Gastrointestinal: Negative for diarrhea, nausea and vomiting.   Genitourinary: Negative for difficulty urinating, dysuria, flank pain, frequency, hematuria and urgency.   Musculoskeletal: Positive for back pain (lower).   Neurological: Negative for dizziness.       Objective:       Blood pressure 118/78, pulse 92, height 6' 2" (1.88 m), weight (!) 155.2 kg (342 lb 2.5 oz), SpO2 98 %.  Physical Exam  Vitals signs reviewed.   Constitutional:       General: He is not in acute distress.     Appearance: Normal appearance. He is well-developed. He is obese. He is not toxic-appearing or diaphoretic.   Eyes:      Conjunctiva/sclera: Conjunctivae normal.   Neck:      Musculoskeletal: Neck supple.   Cardiovascular:      Rate and Rhythm: Normal rate and regular rhythm.      Heart sounds: Normal heart sounds. No murmur. No friction " rub. No gallop.    Pulmonary:      Effort: Pulmonary effort is normal. No respiratory distress.      Breath sounds: Normal breath sounds. No wheezing or rales.   Abdominal:      General: Bowel sounds are normal. There is no distension.      Palpations: Abdomen is soft.      Tenderness: There is no abdominal tenderness. There is no right CVA tenderness or left CVA tenderness.   Musculoskeletal:      Right lower leg: No edema.      Left lower leg: No edema.   Skin:     General: Skin is warm and dry.   Neurological:      Mental Status: He is alert and oriented to person, place, and time.   Psychiatric:         Mood and Affect: Mood normal.         Behavior: Behavior normal.         Thought Content: Thought content normal.         Judgment: Judgment normal.           Lab Results   Component Value Date    CREATININE 2.7 (H) 10/20/2020    URICACID 7.7 (H) 08/22/2017     Prot/Creat Ratio, Ur   Date Value Ref Range Status   08/22/2017 0.22 (H) 0.00 - 0.20 Final     Lab Results   Component Value Date     10/20/2020    K 4.5 10/20/2020    CO2 22 (L) 10/20/2020     10/20/2020     Lab Results   Component Value Date    PTH 78.0 (H) 08/22/2017    CALCIUM 9.1 10/20/2020    PHOS 3.1 08/22/2017     Lab Results   Component Value Date    HGB 13.1 (L) 10/20/2020    WBC 7.88 10/20/2020    HCT 39.7 (L) 10/20/2020      Lab Results   Component Value Date    HGBA1C 4.9 10/20/2020     10/20/2020    BUN 28 (H) 10/20/2020     Lab Results   Component Value Date    LDLCALC Invalid, Trig>400.0 10/20/2020         Assessment:       1. Stage 3b chronic kidney disease    2. Benign essential HTN    3. Proteinuria, unspecified type    4. Hyperparathyroidism        Plan:   CKD stage 3B c eGFR 30-35 mL/min - likely 2/2 longstanding poorly controlled HTN. Need to trend labs to determine if current sCr is LYNNETTE vs progression from 3 years ago.  Pt states that he has not gotten labs other places.  Educated patient to control BP, BG, remain  well-hydrated, and avoid NSAIDs to prevent progression of CKD. Encouraged weight loss; pt says he has been walking for exercise.    UPCR Previously proteinuric. Need repeat.   Acid-base Mildly low serum bicarb x1.   Renal osteodystrophy Ca okay. PTH previously elevated. Will repeat for next visit.   Anemia Hgb at goal for CKD.   DM N/a   Lipid Management Defer to PCP. Pt states he has already messaged PCP about this. Recommend avoiding fenofibrate.   ESRD planning Anticipatory guidance provided about timing of dialysis. Start discussions and planning when eGFR is about 20 mL/min; most patients start dialysis between 5-10 mL/min.     HTN - WNL on spironolactone 25 mg, amlodipine 10 mg, hydralazine 50 mg q 8 hours, olmesartan 40 mg; has still been taking Hctz but has not taken metoprolol  - if sCr remains stable on repeat labs, will consider refilling Hctz vs. Have pt start taking the metoprolol he was unaware of    All questions patient had were answered.  Asked if further questions. None. F/u in clinic in 3 mos with labs and urine prior to next visit or sooner if needed.  ER for emergency concerns.    Summary of Plan:  1. UA, UPCR, BMP  2. avoid NSAID/ fenofibrate/ bactrim/ IV contrast/ gadolinium/ aminoglycoside where possible  3. RTC in 3 mos c CBC, RFP, UA, UPCR, PTH, Vit D

## 2020-10-27 ENCOUNTER — TELEPHONE (OUTPATIENT)
Dept: NEPHROLOGY | Facility: CLINIC | Age: 55
End: 2020-10-27

## 2020-10-27 ENCOUNTER — PATIENT MESSAGE (OUTPATIENT)
Dept: NEPHROLOGY | Facility: CLINIC | Age: 55
End: 2020-10-27

## 2020-10-27 DIAGNOSIS — N17.9 AKI (ACUTE KIDNEY INJURY): ICD-10-CM

## 2020-10-27 DIAGNOSIS — I10 HYPERTENSION, UNSPECIFIED TYPE: Primary | ICD-10-CM

## 2020-10-27 NOTE — TELEPHONE ENCOUNTER
Called pt and notified him sCr has increased to 3.0. eGFR has further declined.    He has still been taking Hctz but has not started metoprolol. Notified him to stop Hctz and  the metoprolol (start tomorrow) ordered by PCP at the time PCP had told him to stop.    Also recommended increasing hydration.    Renal US ASAP. BMP in the next 10 days.

## 2020-11-06 ENCOUNTER — HOSPITAL ENCOUNTER (OUTPATIENT)
Dept: RADIOLOGY | Facility: OTHER | Age: 55
Discharge: HOME | End: 2020-11-06
Attending: NURSE PRACTITIONER
Payer: MEDICARE

## 2020-11-06 ENCOUNTER — PATIENT MESSAGE (OUTPATIENT)
Dept: ALLERGY | Facility: CLINIC | Age: 55
End: 2020-11-06

## 2020-11-06 DIAGNOSIS — N17.9 AKI (ACUTE KIDNEY INJURY): ICD-10-CM

## 2020-11-06 PROCEDURE — 76770 US EXAM ABDO BACK WALL COMP: CPT | Mod: TC

## 2020-11-06 PROCEDURE — 76770 US RETROPERITONEAL COMPLETE: ICD-10-PCS | Mod: 26,,, | Performed by: RADIOLOGY

## 2020-11-06 PROCEDURE — 76770 US EXAM ABDO BACK WALL COMP: CPT | Mod: 26,,, | Performed by: RADIOLOGY

## 2020-11-09 ENCOUNTER — LAB VISIT (OUTPATIENT)
Dept: LAB | Facility: OTHER | Age: 55
End: 2020-11-09
Attending: NURSE PRACTITIONER
Payer: MEDICARE

## 2020-11-09 ENCOUNTER — PATIENT MESSAGE (OUTPATIENT)
Dept: NEPHROLOGY | Facility: CLINIC | Age: 55
End: 2020-11-09

## 2020-11-09 DIAGNOSIS — N17.9 AKI (ACUTE KIDNEY INJURY): ICD-10-CM

## 2020-11-09 LAB
ANION GAP SERPL CALC-SCNC: 11 MMOL/L (ref 8–16)
BUN SERPL-MCNC: 26 MG/DL (ref 6–20)
CALCIUM SERPL-MCNC: 8.8 MG/DL (ref 8.7–10.5)
CHLORIDE SERPL-SCNC: 105 MMOL/L (ref 95–110)
CO2 SERPL-SCNC: 24 MMOL/L (ref 23–29)
CREAT SERPL-MCNC: 2.5 MG/DL (ref 0.5–1.4)
EST. GFR  (AFRICAN AMERICAN): 33 ML/MIN/1.73 M^2
EST. GFR  (NON AFRICAN AMERICAN): 28 ML/MIN/1.73 M^2
GLUCOSE SERPL-MCNC: 148 MG/DL (ref 70–110)
POTASSIUM SERPL-SCNC: 4 MMOL/L (ref 3.5–5.1)
SODIUM SERPL-SCNC: 140 MMOL/L (ref 136–145)

## 2020-11-09 PROCEDURE — 36415 COLL VENOUS BLD VENIPUNCTURE: CPT

## 2020-11-09 PROCEDURE — 80048 BASIC METABOLIC PNL TOTAL CA: CPT

## 2020-12-11 ENCOUNTER — PATIENT MESSAGE (OUTPATIENT)
Dept: OTHER | Facility: OTHER | Age: 55
End: 2020-12-11

## 2021-01-05 ENCOUNTER — PATIENT MESSAGE (OUTPATIENT)
Dept: ADMINISTRATIVE | Facility: HOSPITAL | Age: 56
End: 2021-01-05

## 2021-01-23 ENCOUNTER — PATIENT OUTREACH (OUTPATIENT)
Dept: ADMINISTRATIVE | Facility: OTHER | Age: 56
End: 2021-01-23

## 2021-01-26 ENCOUNTER — LAB VISIT (OUTPATIENT)
Dept: LAB | Facility: HOSPITAL | Age: 56
End: 2021-01-26
Payer: MEDICARE

## 2021-01-26 ENCOUNTER — RESEARCH ENCOUNTER (OUTPATIENT)
Dept: RESEARCH | Facility: HOSPITAL | Age: 56
End: 2021-01-26

## 2021-01-26 ENCOUNTER — OFFICE VISIT (OUTPATIENT)
Dept: NEPHROLOGY | Facility: CLINIC | Age: 56
End: 2021-01-26
Payer: MEDICARE

## 2021-01-26 VITALS
WEIGHT: 315 LBS | BODY MASS INDEX: 40.43 KG/M2 | OXYGEN SATURATION: 95 % | HEIGHT: 74 IN | HEART RATE: 76 BPM | DIASTOLIC BLOOD PRESSURE: 88 MMHG | SYSTOLIC BLOOD PRESSURE: 138 MMHG

## 2021-01-26 DIAGNOSIS — I10 BENIGN ESSENTIAL HTN: ICD-10-CM

## 2021-01-26 DIAGNOSIS — N18.32 STAGE 3B CHRONIC KIDNEY DISEASE: ICD-10-CM

## 2021-01-26 DIAGNOSIS — D64.9 ANEMIA, UNSPECIFIED TYPE: ICD-10-CM

## 2021-01-26 DIAGNOSIS — N18.32 STAGE 3B CHRONIC KIDNEY DISEASE: Primary | ICD-10-CM

## 2021-01-26 DIAGNOSIS — E21.3 HYPERPARATHYROIDISM: ICD-10-CM

## 2021-01-26 LAB
25(OH)D3+25(OH)D2 SERPL-MCNC: 15 NG/ML (ref 30–96)
ALBUMIN SERPL BCP-MCNC: 3.5 G/DL (ref 3.5–5.2)
ANION GAP SERPL CALC-SCNC: 10 MMOL/L (ref 8–16)
BASOPHILS # BLD AUTO: 0.05 K/UL (ref 0–0.2)
BASOPHILS NFR BLD: 0.7 % (ref 0–1.9)
BUN SERPL-MCNC: 24 MG/DL (ref 6–20)
CALCIUM SERPL-MCNC: 9.1 MG/DL (ref 8.7–10.5)
CHLORIDE SERPL-SCNC: 104 MMOL/L (ref 95–110)
CO2 SERPL-SCNC: 27 MMOL/L (ref 23–29)
CREAT SERPL-MCNC: 2.7 MG/DL (ref 0.5–1.4)
DIFFERENTIAL METHOD: ABNORMAL
EOSINOPHIL # BLD AUTO: 0.6 K/UL (ref 0–0.5)
EOSINOPHIL NFR BLD: 7.8 % (ref 0–8)
ERYTHROCYTE [DISTWIDTH] IN BLOOD BY AUTOMATED COUNT: 13.5 % (ref 11.5–14.5)
EST. GFR  (AFRICAN AMERICAN): 29.8 ML/MIN/1.73 M^2
EST. GFR  (NON AFRICAN AMERICAN): 25.7 ML/MIN/1.73 M^2
GLUCOSE SERPL-MCNC: 90 MG/DL (ref 70–110)
HCT VFR BLD AUTO: 39.1 % (ref 40–54)
HGB BLD-MCNC: 12.5 G/DL (ref 14–18)
IMM GRANULOCYTES # BLD AUTO: 0.02 K/UL (ref 0–0.04)
IMM GRANULOCYTES NFR BLD AUTO: 0.3 % (ref 0–0.5)
LYMPHOCYTES # BLD AUTO: 3.8 K/UL (ref 1–4.8)
LYMPHOCYTES NFR BLD: 51.5 % (ref 18–48)
MCH RBC QN AUTO: 33 PG (ref 27–31)
MCHC RBC AUTO-ENTMCNC: 32 G/DL (ref 32–36)
MCV RBC AUTO: 103 FL (ref 82–98)
MONOCYTES # BLD AUTO: 0.6 K/UL (ref 0.3–1)
MONOCYTES NFR BLD: 7.9 % (ref 4–15)
NEUTROPHILS # BLD AUTO: 2.4 K/UL (ref 1.8–7.7)
NEUTROPHILS NFR BLD: 31.8 % (ref 38–73)
NRBC BLD-RTO: 0 /100 WBC
PHOSPHATE SERPL-MCNC: 3.3 MG/DL (ref 2.7–4.5)
PLATELET # BLD AUTO: 216 K/UL (ref 150–350)
PMV BLD AUTO: 10.6 FL (ref 9.2–12.9)
POTASSIUM SERPL-SCNC: 4.2 MMOL/L (ref 3.5–5.1)
PTH-INTACT SERPL-MCNC: 131 PG/ML (ref 9–77)
RBC # BLD AUTO: 3.79 M/UL (ref 4.6–6.2)
SODIUM SERPL-SCNC: 141 MMOL/L (ref 136–145)
WBC # BLD AUTO: 7.45 K/UL (ref 3.9–12.7)

## 2021-01-26 PROCEDURE — 99214 OFFICE O/P EST MOD 30 MIN: CPT | Mod: S$PBB,,, | Performed by: NURSE PRACTITIONER

## 2021-01-26 PROCEDURE — 99999 PR PBB SHADOW E&M-EST. PATIENT-LVL IV: ICD-10-PCS | Mod: PBBFAC,,, | Performed by: NURSE PRACTITIONER

## 2021-01-26 PROCEDURE — 36415 COLL VENOUS BLD VENIPUNCTURE: CPT

## 2021-01-26 PROCEDURE — 99999 PR PBB SHADOW E&M-EST. PATIENT-LVL IV: CPT | Mod: PBBFAC,,, | Performed by: NURSE PRACTITIONER

## 2021-01-26 PROCEDURE — 80069 RENAL FUNCTION PANEL: CPT

## 2021-01-26 PROCEDURE — 99214 PR OFFICE/OUTPT VISIT, EST, LEVL IV, 30-39 MIN: ICD-10-PCS | Mod: S$PBB,,, | Performed by: NURSE PRACTITIONER

## 2021-01-26 PROCEDURE — 83970 ASSAY OF PARATHORMONE: CPT

## 2021-01-26 PROCEDURE — 82306 VITAMIN D 25 HYDROXY: CPT

## 2021-01-26 PROCEDURE — 85025 COMPLETE CBC W/AUTO DIFF WBC: CPT

## 2021-01-26 PROCEDURE — 99214 OFFICE O/P EST MOD 30 MIN: CPT | Mod: PBBFAC | Performed by: NURSE PRACTITIONER

## 2021-01-26 RX ORDER — SPIRONOLACTONE 25 MG/1
25 TABLET ORAL DAILY
Qty: 90 TABLET | Refills: 3 | Status: SHIPPED | OUTPATIENT
Start: 2021-01-26 | End: 2021-03-17 | Stop reason: SDUPTHER

## 2021-01-27 ENCOUNTER — PATIENT MESSAGE (OUTPATIENT)
Dept: NEPHROLOGY | Facility: CLINIC | Age: 56
End: 2021-01-27

## 2021-01-27 DIAGNOSIS — E55.9 HYPOVITAMINOSIS D: Primary | ICD-10-CM

## 2021-01-27 DIAGNOSIS — E21.3 HYPERPARATHYROIDISM: ICD-10-CM

## 2021-01-27 RX ORDER — ERGOCALCIFEROL 1.25 MG/1
50000 CAPSULE ORAL
Qty: 12 CAPSULE | Refills: 1 | Status: SHIPPED | OUTPATIENT
Start: 2021-01-27 | End: 2021-04-27 | Stop reason: SDUPTHER

## 2021-02-03 ENCOUNTER — PATIENT MESSAGE (OUTPATIENT)
Dept: NEPHROLOGY | Facility: CLINIC | Age: 56
End: 2021-02-03

## 2021-02-24 ENCOUNTER — PATIENT MESSAGE (OUTPATIENT)
Dept: NEPHROLOGY | Facility: CLINIC | Age: 56
End: 2021-02-24

## 2021-03-03 ENCOUNTER — PATIENT MESSAGE (OUTPATIENT)
Dept: ADMINISTRATIVE | Facility: HOSPITAL | Age: 56
End: 2021-03-03

## 2021-03-03 ENCOUNTER — PATIENT OUTREACH (OUTPATIENT)
Dept: ADMINISTRATIVE | Facility: HOSPITAL | Age: 56
End: 2021-03-03

## 2021-03-14 ENCOUNTER — IMMUNIZATION (OUTPATIENT)
Dept: INTERNAL MEDICINE | Facility: CLINIC | Age: 56
End: 2021-03-14
Payer: MEDICARE

## 2021-03-14 DIAGNOSIS — Z23 NEED FOR VACCINATION: Primary | ICD-10-CM

## 2021-03-14 PROCEDURE — 0031A COVID-19,VECTOR-NR,RS-AD26,PF,0.5 ML DOSE VACCINE (JANSSEN): CPT | Mod: PBBFAC

## 2021-03-17 ENCOUNTER — PATIENT MESSAGE (OUTPATIENT)
Dept: ADMINISTRATIVE | Facility: OTHER | Age: 56
End: 2021-03-17

## 2021-03-17 ENCOUNTER — OFFICE VISIT (OUTPATIENT)
Dept: INTERNAL MEDICINE | Facility: CLINIC | Age: 56
End: 2021-03-17
Attending: INTERNAL MEDICINE
Payer: MEDICARE

## 2021-03-17 VITALS
WEIGHT: 315 LBS | SYSTOLIC BLOOD PRESSURE: 142 MMHG | HEIGHT: 73 IN | BODY MASS INDEX: 41.75 KG/M2 | DIASTOLIC BLOOD PRESSURE: 86 MMHG

## 2021-03-17 DIAGNOSIS — D75.89 MACROCYTOSIS: Primary | ICD-10-CM

## 2021-03-17 DIAGNOSIS — I10 BENIGN ESSENTIAL HTN: ICD-10-CM

## 2021-03-17 DIAGNOSIS — R20.0 ANESTHESIA OF SKIN: ICD-10-CM

## 2021-03-17 DIAGNOSIS — Z12.11 COLON CANCER SCREENING: ICD-10-CM

## 2021-03-17 PROCEDURE — 99214 OFFICE O/P EST MOD 30 MIN: CPT | Mod: S$PBB,,, | Performed by: INTERNAL MEDICINE

## 2021-03-17 PROCEDURE — 99999 PR PBB SHADOW E&M-EST. PATIENT-LVL III: CPT | Mod: PBBFAC,,, | Performed by: INTERNAL MEDICINE

## 2021-03-17 PROCEDURE — 99999 PR PBB SHADOW E&M-EST. PATIENT-LVL III: ICD-10-PCS | Mod: PBBFAC,,, | Performed by: INTERNAL MEDICINE

## 2021-03-17 PROCEDURE — 99214 PR OFFICE/OUTPT VISIT, EST, LEVL IV, 30-39 MIN: ICD-10-PCS | Mod: S$PBB,,, | Performed by: INTERNAL MEDICINE

## 2021-03-17 PROCEDURE — 99213 OFFICE O/P EST LOW 20 MIN: CPT | Mod: PBBFAC | Performed by: INTERNAL MEDICINE

## 2021-03-17 RX ORDER — SPIRONOLACTONE 25 MG/1
25 TABLET ORAL DAILY
Qty: 90 TABLET | Refills: 3 | Status: SHIPPED | OUTPATIENT
Start: 2021-03-17 | End: 2021-04-27

## 2021-03-19 ENCOUNTER — PES CALL (OUTPATIENT)
Dept: ADMINISTRATIVE | Facility: CLINIC | Age: 56
End: 2021-03-19

## 2021-04-05 ENCOUNTER — PATIENT MESSAGE (OUTPATIENT)
Dept: ADMINISTRATIVE | Facility: HOSPITAL | Age: 56
End: 2021-04-05

## 2021-04-09 LAB — NONINV COLON CA DNA+OCC BLD SCRN STL QL: NEGATIVE

## 2021-04-17 ENCOUNTER — PATIENT MESSAGE (OUTPATIENT)
Dept: INTERNAL MEDICINE | Facility: CLINIC | Age: 56
End: 2021-04-17

## 2021-04-20 ENCOUNTER — OFFICE VISIT (OUTPATIENT)
Dept: INTERNAL MEDICINE | Facility: CLINIC | Age: 56
End: 2021-04-20
Attending: FAMILY MEDICINE
Payer: MEDICARE

## 2021-04-20 ENCOUNTER — LAB VISIT (OUTPATIENT)
Dept: LAB | Facility: OTHER | Age: 56
End: 2021-04-20
Attending: UROLOGY
Payer: MEDICARE

## 2021-04-20 VITALS
WEIGHT: 315 LBS | HEART RATE: 83 BPM | HEIGHT: 73 IN | OXYGEN SATURATION: 95 % | BODY MASS INDEX: 41.75 KG/M2 | SYSTOLIC BLOOD PRESSURE: 126 MMHG | DIASTOLIC BLOOD PRESSURE: 80 MMHG

## 2021-04-20 DIAGNOSIS — D75.89 MACROCYTOSIS: ICD-10-CM

## 2021-04-20 DIAGNOSIS — I10 BENIGN ESSENTIAL HTN: ICD-10-CM

## 2021-04-20 DIAGNOSIS — R07.89 ATYPICAL CHEST PAIN: Primary | ICD-10-CM

## 2021-04-20 DIAGNOSIS — N18.32 STAGE 3B CHRONIC KIDNEY DISEASE: ICD-10-CM

## 2021-04-20 DIAGNOSIS — R20.0 ANESTHESIA OF SKIN: ICD-10-CM

## 2021-04-20 LAB
ALBUMIN SERPL BCP-MCNC: 3.2 G/DL (ref 3.5–5.2)
ANION GAP SERPL CALC-SCNC: 10 MMOL/L (ref 8–16)
BASOPHILS # BLD AUTO: 0.05 K/UL (ref 0–0.2)
BASOPHILS NFR BLD: 0.7 % (ref 0–1.9)
BUN SERPL-MCNC: 23 MG/DL (ref 6–20)
CALCIUM SERPL-MCNC: 8.9 MG/DL (ref 8.7–10.5)
CHLORIDE SERPL-SCNC: 104 MMOL/L (ref 95–110)
CO2 SERPL-SCNC: 26 MMOL/L (ref 23–29)
CREAT SERPL-MCNC: 2.7 MG/DL (ref 0.5–1.4)
DIFFERENTIAL METHOD: ABNORMAL
EOSINOPHIL # BLD AUTO: 0.6 K/UL (ref 0–0.5)
EOSINOPHIL NFR BLD: 7.9 % (ref 0–8)
ERYTHROCYTE [DISTWIDTH] IN BLOOD BY AUTOMATED COUNT: 13.5 % (ref 11.5–14.5)
EST. GFR  (AFRICAN AMERICAN): 30 ML/MIN/1.73 M^2
EST. GFR  (NON AFRICAN AMERICAN): 26 ML/MIN/1.73 M^2
GLUCOSE SERPL-MCNC: 147 MG/DL (ref 70–110)
HCT VFR BLD AUTO: 38.6 % (ref 40–54)
HGB BLD-MCNC: 12.3 G/DL (ref 14–18)
IMM GRANULOCYTES # BLD AUTO: 0.01 K/UL (ref 0–0.04)
IMM GRANULOCYTES NFR BLD AUTO: 0.1 % (ref 0–0.5)
LYMPHOCYTES # BLD AUTO: 3.2 K/UL (ref 1–4.8)
LYMPHOCYTES NFR BLD: 46 % (ref 18–48)
MCH RBC QN AUTO: 32.6 PG (ref 27–31)
MCHC RBC AUTO-ENTMCNC: 31.9 G/DL (ref 32–36)
MCV RBC AUTO: 102 FL (ref 82–98)
MONOCYTES # BLD AUTO: 0.6 K/UL (ref 0.3–1)
MONOCYTES NFR BLD: 8 % (ref 4–15)
NEUTROPHILS # BLD AUTO: 2.6 K/UL (ref 1.8–7.7)
NEUTROPHILS NFR BLD: 37.3 % (ref 38–73)
NRBC BLD-RTO: 0 /100 WBC
PHOSPHATE SERPL-MCNC: 3.4 MG/DL (ref 2.7–4.5)
PLATELET # BLD AUTO: 223 K/UL (ref 150–450)
PMV BLD AUTO: 10.9 FL (ref 9.2–12.9)
POTASSIUM SERPL-SCNC: 3.9 MMOL/L (ref 3.5–5.1)
RBC # BLD AUTO: 3.77 M/UL (ref 4.6–6.2)
SODIUM SERPL-SCNC: 140 MMOL/L (ref 136–145)
VIT B12 SERPL-MCNC: 465 PG/ML (ref 210–950)
WBC # BLD AUTO: 7 K/UL (ref 3.9–12.7)

## 2021-04-20 PROCEDURE — 99999 PR PBB SHADOW E&M-EST. PATIENT-LVL IV: ICD-10-PCS | Mod: PBBFAC,,, | Performed by: FAMILY MEDICINE

## 2021-04-20 PROCEDURE — 82607 VITAMIN B-12: CPT | Performed by: INTERNAL MEDICINE

## 2021-04-20 PROCEDURE — 99214 PR OFFICE/OUTPT VISIT, EST, LEVL IV, 30-39 MIN: ICD-10-PCS | Mod: S$PBB,,, | Performed by: FAMILY MEDICINE

## 2021-04-20 PROCEDURE — 99214 OFFICE O/P EST MOD 30 MIN: CPT | Mod: PBBFAC | Performed by: FAMILY MEDICINE

## 2021-04-20 PROCEDURE — 99999 PR PBB SHADOW E&M-EST. PATIENT-LVL IV: CPT | Mod: PBBFAC,,, | Performed by: FAMILY MEDICINE

## 2021-04-20 PROCEDURE — 80069 RENAL FUNCTION PANEL: CPT | Performed by: NURSE PRACTITIONER

## 2021-04-20 PROCEDURE — 85025 COMPLETE CBC W/AUTO DIFF WBC: CPT | Performed by: NURSE PRACTITIONER

## 2021-04-20 PROCEDURE — 36415 COLL VENOUS BLD VENIPUNCTURE: CPT | Performed by: INTERNAL MEDICINE

## 2021-04-20 PROCEDURE — 99214 OFFICE O/P EST MOD 30 MIN: CPT | Mod: S$PBB,,, | Performed by: FAMILY MEDICINE

## 2021-04-20 RX ORDER — OMEPRAZOLE 40 MG/1
40 CAPSULE, DELAYED RELEASE ORAL DAILY
Qty: 30 CAPSULE | Refills: 11 | Status: SHIPPED | OUTPATIENT
Start: 2021-04-20 | End: 2022-02-15

## 2021-04-23 ENCOUNTER — OFFICE VISIT (OUTPATIENT)
Dept: CARDIOLOGY | Facility: CLINIC | Age: 56
End: 2021-04-23
Attending: FAMILY MEDICINE
Payer: MEDICARE

## 2021-04-23 ENCOUNTER — PATIENT OUTREACH (OUTPATIENT)
Dept: ADMINISTRATIVE | Facility: OTHER | Age: 56
End: 2021-04-23

## 2021-04-23 VITALS
OXYGEN SATURATION: 98 % | HEART RATE: 89 BPM | DIASTOLIC BLOOD PRESSURE: 94 MMHG | HEIGHT: 73 IN | WEIGHT: 315 LBS | BODY MASS INDEX: 41.75 KG/M2 | SYSTOLIC BLOOD PRESSURE: 144 MMHG

## 2021-04-23 DIAGNOSIS — E78.1 HYPERTRIGLYCERIDEMIA: ICD-10-CM

## 2021-04-23 DIAGNOSIS — N18.30 STAGE 3 CHRONIC KIDNEY DISEASE, UNSPECIFIED WHETHER STAGE 3A OR 3B CKD: ICD-10-CM

## 2021-04-23 DIAGNOSIS — R07.89 ATYPICAL CHEST PAIN: ICD-10-CM

## 2021-04-23 DIAGNOSIS — I10 BENIGN ESSENTIAL HTN: Primary | ICD-10-CM

## 2021-04-23 PROCEDURE — 99999 PR PBB SHADOW E&M-EST. PATIENT-LVL III: CPT | Mod: PBBFAC,,, | Performed by: INTERNAL MEDICINE

## 2021-04-23 PROCEDURE — 93005 ELECTROCARDIOGRAM TRACING: CPT

## 2021-04-23 PROCEDURE — 93010 EKG 12-LEAD: ICD-10-PCS | Mod: ,,, | Performed by: INTERNAL MEDICINE

## 2021-04-23 PROCEDURE — 99213 OFFICE O/P EST LOW 20 MIN: CPT | Mod: PBBFAC,25 | Performed by: INTERNAL MEDICINE

## 2021-04-23 PROCEDURE — 93010 ELECTROCARDIOGRAM REPORT: CPT | Mod: ,,, | Performed by: INTERNAL MEDICINE

## 2021-04-23 PROCEDURE — 99204 OFFICE O/P NEW MOD 45 MIN: CPT | Mod: S$PBB,,, | Performed by: INTERNAL MEDICINE

## 2021-04-23 PROCEDURE — 99999 PR PBB SHADOW E&M-EST. PATIENT-LVL III: ICD-10-PCS | Mod: PBBFAC,,, | Performed by: INTERNAL MEDICINE

## 2021-04-23 PROCEDURE — 99204 PR OFFICE/OUTPT VISIT, NEW, LEVL IV, 45-59 MIN: ICD-10-PCS | Mod: S$PBB,,, | Performed by: INTERNAL MEDICINE

## 2021-04-26 ENCOUNTER — HOSPITAL ENCOUNTER (OUTPATIENT)
Dept: CARDIOLOGY | Facility: OTHER | Age: 56
Discharge: HOME | End: 2021-04-26
Attending: INTERNAL MEDICINE
Payer: MEDICARE

## 2021-04-26 ENCOUNTER — IMMUNIZATION (OUTPATIENT)
Dept: PHARMACY | Facility: CLINIC | Age: 56
End: 2021-04-26
Payer: MEDICARE

## 2021-04-26 ENCOUNTER — HOSPITAL ENCOUNTER (OUTPATIENT)
Dept: RADIOLOGY | Facility: OTHER | Age: 56
Discharge: HOME | End: 2021-04-26
Attending: INTERNAL MEDICINE
Payer: MEDICARE

## 2021-04-26 VITALS
HEIGHT: 73 IN | DIASTOLIC BLOOD PRESSURE: 94 MMHG | WEIGHT: 315 LBS | SYSTOLIC BLOOD PRESSURE: 144 MMHG | BODY MASS INDEX: 41.75 KG/M2

## 2021-04-26 DIAGNOSIS — I10 BENIGN ESSENTIAL HTN: ICD-10-CM

## 2021-04-26 DIAGNOSIS — R07.89 ATYPICAL CHEST PAIN: ICD-10-CM

## 2021-04-26 LAB
ASCENDING AORTA: 3.36 CM
AV INDEX (PROSTH): 0.97
AV MEAN GRADIENT: 4 MMHG
AV PEAK GRADIENT: 7 MMHG
AV VALVE AREA: 3.7 CM2
AV VELOCITY RATIO: 0.88
BSA FOR ECHO PROCEDURE: 2.87 M2
CV ECHO LV RWT: 0.39 CM
DOP CALC AO PEAK VEL: 1.28 M/S
DOP CALC AO VTI: 25.65 CM
DOP CALC LVOT AREA: 3.8 CM2
DOP CALC LVOT DIAMETER: 2.21 CM
DOP CALC LVOT PEAK VEL: 1.13 M/S
DOP CALC LVOT STROKE VOLUME: 94.97 CM3
DOP CALCLVOT PEAK VEL VTI: 24.77 CM
E WAVE DECELERATION TIME: 210.34 MSEC
E/A RATIO: 1.45
E/E' RATIO: 10.24 M/S
ECHO LV POSTERIOR WALL: 0.97 CM (ref 0.6–1.1)
EJECTION FRACTION: 60 %
FRACTIONAL SHORTENING: 38 % (ref 28–44)
INTERVENTRICULAR SEPTUM: 0.96 CM (ref 0.6–1.1)
IVRT: 87.66 MSEC
LA MAJOR: 4.41 CM
LA MINOR: 5.07 CM
LA WIDTH: 4.27 CM
LEFT ATRIUM SIZE: 3.53 CM
LEFT ATRIUM VOLUME INDEX MOD: 23 ML/M2
LEFT ATRIUM VOLUME INDEX: 22.1 ML/M2
LEFT ATRIUM VOLUME MOD: 63 CM3
LEFT ATRIUM VOLUME: 60.44 CM3
LEFT INTERNAL DIMENSION IN SYSTOLE: 3.07 CM (ref 2.1–4)
LEFT VENTRICLE DIASTOLIC VOLUME INDEX: 41.55 ML/M2
LEFT VENTRICLE DIASTOLIC VOLUME: 113.85 ML
LEFT VENTRICLE MASS INDEX: 62 G/M2
LEFT VENTRICLE SYSTOLIC VOLUME INDEX: 13.5 ML/M2
LEFT VENTRICLE SYSTOLIC VOLUME: 37.05 ML
LEFT VENTRICULAR INTERNAL DIMENSION IN DIASTOLE: 4.92 CM (ref 3.5–6)
LEFT VENTRICULAR MASS: 168.93 G
LV LATERAL E/E' RATIO: 8.7 M/S
LV SEPTAL E/E' RATIO: 12.43 M/S
MV A" WAVE DURATION": 11.53 MSEC
MV PEAK A VEL: 0.6 M/S
MV PEAK E VEL: 0.87 M/S
MV STENOSIS PRESSURE HALF TIME: 61 MS
MV VALVE AREA P 1/2 METHOD: 3.61 CM2
PISA MRMAX VEL: 0.04 M/S
PULM VEIN S/D RATIO: 1.41
PV PEAK D VEL: 0.44 M/S
PV PEAK S VEL: 0.62 M/S
PV PEAK VELOCITY: 1.01 CM/S
RA MAJOR: 3.87 CM
RIGHT VENTRICULAR END-DIASTOLIC DIMENSION: 3.5 CM
SINUS: 2.97 CM
STJ: 2.8 CM
TDI LATERAL: 0.1 M/S
TDI SEPTAL: 0.07 M/S
TDI: 0.09 M/S
TRICUSPID ANNULAR PLANE SYSTOLIC EXCURSION: 1.87 CM

## 2021-04-26 PROCEDURE — 71046 X-RAY EXAM CHEST 2 VIEWS: CPT | Mod: TC,FY

## 2021-04-26 PROCEDURE — 93306 TTE W/DOPPLER COMPLETE: CPT

## 2021-04-26 PROCEDURE — 93306 TTE W/DOPPLER COMPLETE: CPT | Mod: 26,,, | Performed by: INTERNAL MEDICINE

## 2021-04-26 PROCEDURE — 71046 X-RAY EXAM CHEST 2 VIEWS: CPT | Mod: 26,,, | Performed by: RADIOLOGY

## 2021-04-26 PROCEDURE — 93306 ECHO (CUPID ONLY): ICD-10-PCS | Mod: 26,,, | Performed by: INTERNAL MEDICINE

## 2021-04-26 PROCEDURE — 71046 XR CHEST PA AND LATERAL: ICD-10-PCS | Mod: 26,,, | Performed by: RADIOLOGY

## 2021-04-27 ENCOUNTER — OFFICE VISIT (OUTPATIENT)
Dept: NEPHROLOGY | Facility: CLINIC | Age: 56
End: 2021-04-27
Payer: MEDICARE

## 2021-04-27 VITALS
BODY MASS INDEX: 41.75 KG/M2 | OXYGEN SATURATION: 98 % | DIASTOLIC BLOOD PRESSURE: 78 MMHG | HEART RATE: 80 BPM | WEIGHT: 315 LBS | SYSTOLIC BLOOD PRESSURE: 138 MMHG | HEIGHT: 73 IN

## 2021-04-27 DIAGNOSIS — N18.32 STAGE 3B CHRONIC KIDNEY DISEASE: Primary | ICD-10-CM

## 2021-04-27 DIAGNOSIS — I10 BENIGN ESSENTIAL HTN: ICD-10-CM

## 2021-04-27 DIAGNOSIS — I10 HYPERTENSION, UNSPECIFIED TYPE: ICD-10-CM

## 2021-04-27 DIAGNOSIS — E55.9 HYPOVITAMINOSIS D: ICD-10-CM

## 2021-04-27 DIAGNOSIS — E21.3 HYPERPARATHYROIDISM: ICD-10-CM

## 2021-04-27 PROCEDURE — 99214 OFFICE O/P EST MOD 30 MIN: CPT | Mod: S$PBB,,, | Performed by: NURSE PRACTITIONER

## 2021-04-27 PROCEDURE — 99999 PR PBB SHADOW E&M-EST. PATIENT-LVL III: ICD-10-PCS | Mod: PBBFAC,,, | Performed by: NURSE PRACTITIONER

## 2021-04-27 PROCEDURE — 99999 PR PBB SHADOW E&M-EST. PATIENT-LVL III: CPT | Mod: PBBFAC,,, | Performed by: NURSE PRACTITIONER

## 2021-04-27 PROCEDURE — 99213 OFFICE O/P EST LOW 20 MIN: CPT | Mod: PBBFAC | Performed by: NURSE PRACTITIONER

## 2021-04-27 PROCEDURE — 99214 PR OFFICE/OUTPT VISIT, EST, LEVL IV, 30-39 MIN: ICD-10-PCS | Mod: S$PBB,,, | Performed by: NURSE PRACTITIONER

## 2021-04-27 RX ORDER — HYDRALAZINE HYDROCHLORIDE 50 MG/1
50 TABLET, FILM COATED ORAL EVERY 8 HOURS
Qty: 270 TABLET | Refills: 3 | Status: SHIPPED | OUTPATIENT
Start: 2021-04-27 | End: 2021-10-14 | Stop reason: SDUPTHER

## 2021-04-27 RX ORDER — ERGOCALCIFEROL 1.25 MG/1
50000 CAPSULE ORAL
Qty: 12 CAPSULE | Refills: 1 | Status: SHIPPED | OUTPATIENT
Start: 2021-04-27 | End: 2022-02-15

## 2021-04-27 RX ORDER — AMLODIPINE BESYLATE 10 MG/1
10 TABLET ORAL DAILY
Qty: 90 TABLET | Refills: 3 | Status: SHIPPED | OUTPATIENT
Start: 2021-04-27 | End: 2021-09-24

## 2021-04-27 RX ORDER — OLMESARTAN MEDOXOMIL 40 MG/1
40 TABLET ORAL DAILY
Qty: 90 TABLET | Refills: 3 | Status: SHIPPED | OUTPATIENT
Start: 2021-04-27 | End: 2021-10-14 | Stop reason: SDUPTHER

## 2021-04-27 RX ORDER — METOPROLOL SUCCINATE 25 MG/1
25 TABLET, EXTENDED RELEASE ORAL DAILY
Qty: 90 TABLET | Refills: 3 | Status: SHIPPED | OUTPATIENT
Start: 2021-04-27 | End: 2021-10-14 | Stop reason: SDUPTHER

## 2021-04-28 ENCOUNTER — TELEPHONE (OUTPATIENT)
Dept: ADMINISTRATIVE | Facility: CLINIC | Age: 56
End: 2021-04-28

## 2021-05-03 ENCOUNTER — TELEPHONE (OUTPATIENT)
Dept: ADMINISTRATIVE | Facility: CLINIC | Age: 56
End: 2021-05-03

## 2021-05-17 ENCOUNTER — PES CALL (OUTPATIENT)
Dept: ADMINISTRATIVE | Facility: CLINIC | Age: 56
End: 2021-05-17

## 2021-07-19 ENCOUNTER — LAB VISIT (OUTPATIENT)
Dept: LAB | Facility: OTHER | Age: 56
End: 2021-07-19
Attending: NURSE PRACTITIONER
Payer: MEDICARE

## 2021-07-19 DIAGNOSIS — N18.32 STAGE 3B CHRONIC KIDNEY DISEASE: ICD-10-CM

## 2021-07-19 DIAGNOSIS — E55.9 HYPOVITAMINOSIS D: ICD-10-CM

## 2021-07-19 LAB
25(OH)D3+25(OH)D2 SERPL-MCNC: 34 NG/ML (ref 30–96)
ALBUMIN SERPL BCP-MCNC: 3.4 G/DL (ref 3.5–5.2)
ANION GAP SERPL CALC-SCNC: 12 MMOL/L (ref 8–16)
BASOPHILS # BLD AUTO: 0.04 K/UL (ref 0–0.2)
BASOPHILS NFR BLD: 0.5 % (ref 0–1.9)
BUN SERPL-MCNC: 25 MG/DL (ref 6–20)
CALCIUM SERPL-MCNC: 9.1 MG/DL (ref 8.7–10.5)
CHLORIDE SERPL-SCNC: 104 MMOL/L (ref 95–110)
CO2 SERPL-SCNC: 25 MMOL/L (ref 23–29)
CREAT SERPL-MCNC: 2.4 MG/DL (ref 0.5–1.4)
DIFFERENTIAL METHOD: ABNORMAL
EOSINOPHIL # BLD AUTO: 0.5 K/UL (ref 0–0.5)
EOSINOPHIL NFR BLD: 6.3 % (ref 0–8)
ERYTHROCYTE [DISTWIDTH] IN BLOOD BY AUTOMATED COUNT: 13.3 % (ref 11.5–14.5)
EST. GFR  (AFRICAN AMERICAN): 34 ML/MIN/1.73 M^2
EST. GFR  (NON AFRICAN AMERICAN): 30 ML/MIN/1.73 M^2
GLUCOSE SERPL-MCNC: 133 MG/DL (ref 70–110)
HCT VFR BLD AUTO: 40.9 % (ref 40–54)
HGB BLD-MCNC: 13.3 G/DL (ref 14–18)
IMM GRANULOCYTES # BLD AUTO: 0.01 K/UL (ref 0–0.04)
IMM GRANULOCYTES NFR BLD AUTO: 0.1 % (ref 0–0.5)
LYMPHOCYTES # BLD AUTO: 4.5 K/UL (ref 1–4.8)
LYMPHOCYTES NFR BLD: 54.6 % (ref 18–48)
MCH RBC QN AUTO: 33.6 PG (ref 27–31)
MCHC RBC AUTO-ENTMCNC: 32.5 G/DL (ref 32–36)
MCV RBC AUTO: 103 FL (ref 82–98)
MONOCYTES # BLD AUTO: 0.6 K/UL (ref 0.3–1)
MONOCYTES NFR BLD: 7.1 % (ref 4–15)
NEUTROPHILS # BLD AUTO: 2.6 K/UL (ref 1.8–7.7)
NEUTROPHILS NFR BLD: 31.4 % (ref 38–73)
NRBC BLD-RTO: 0 /100 WBC
PHOSPHATE SERPL-MCNC: 4 MG/DL (ref 2.7–4.5)
PLATELET # BLD AUTO: 221 K/UL (ref 150–450)
PMV BLD AUTO: 11.1 FL (ref 9.2–12.9)
POTASSIUM SERPL-SCNC: 3.7 MMOL/L (ref 3.5–5.1)
PTH-INTACT SERPL-MCNC: 151.8 PG/ML (ref 9–77)
RBC # BLD AUTO: 3.96 M/UL (ref 4.6–6.2)
SODIUM SERPL-SCNC: 141 MMOL/L (ref 136–145)
WBC # BLD AUTO: 8.19 K/UL (ref 3.9–12.7)

## 2021-07-19 PROCEDURE — 36415 COLL VENOUS BLD VENIPUNCTURE: CPT | Performed by: NURSE PRACTITIONER

## 2021-07-19 PROCEDURE — 80069 RENAL FUNCTION PANEL: CPT | Performed by: NURSE PRACTITIONER

## 2021-07-19 PROCEDURE — 83970 ASSAY OF PARATHORMONE: CPT | Performed by: NURSE PRACTITIONER

## 2021-07-19 PROCEDURE — 85025 COMPLETE CBC W/AUTO DIFF WBC: CPT | Performed by: NURSE PRACTITIONER

## 2021-07-19 PROCEDURE — 82306 VITAMIN D 25 HYDROXY: CPT | Performed by: NURSE PRACTITIONER

## 2021-07-21 ENCOUNTER — PATIENT OUTREACH (OUTPATIENT)
Dept: ADMINISTRATIVE | Facility: OTHER | Age: 56
End: 2021-07-21

## 2021-07-21 ENCOUNTER — IMMUNIZATION (OUTPATIENT)
Dept: PHARMACY | Facility: CLINIC | Age: 56
End: 2021-07-21
Payer: MEDICARE

## 2021-08-05 ENCOUNTER — TELEPHONE (OUTPATIENT)
Dept: INTERNAL MEDICINE | Facility: CLINIC | Age: 56
End: 2021-08-05

## 2021-08-05 ENCOUNTER — PES CALL (OUTPATIENT)
Dept: ADMINISTRATIVE | Facility: CLINIC | Age: 56
End: 2021-08-05

## 2021-09-22 ENCOUNTER — PES CALL (OUTPATIENT)
Dept: ADMINISTRATIVE | Facility: CLINIC | Age: 56
End: 2021-09-22

## 2021-09-23 ENCOUNTER — TELEPHONE (OUTPATIENT)
Dept: FAMILY MEDICINE | Facility: CLINIC | Age: 56
End: 2021-09-23

## 2021-09-23 DIAGNOSIS — I10 BENIGN ESSENTIAL HTN: ICD-10-CM

## 2021-09-24 RX ORDER — AMLODIPINE BESYLATE 10 MG/1
TABLET ORAL
Qty: 90 TABLET | Refills: 0 | Status: SHIPPED | OUTPATIENT
Start: 2021-09-24 | End: 2021-10-14 | Stop reason: SDUPTHER

## 2021-10-14 ENCOUNTER — TELEPHONE (OUTPATIENT)
Dept: INTERNAL MEDICINE | Facility: CLINIC | Age: 56
End: 2021-10-14

## 2021-10-14 ENCOUNTER — OFFICE VISIT (OUTPATIENT)
Dept: INTERNAL MEDICINE | Facility: CLINIC | Age: 56
End: 2021-10-14
Payer: MEDICARE

## 2021-10-14 VITALS
DIASTOLIC BLOOD PRESSURE: 99 MMHG | SYSTOLIC BLOOD PRESSURE: 158 MMHG | WEIGHT: 315 LBS | OXYGEN SATURATION: 96 % | BODY MASS INDEX: 41.75 KG/M2 | HEART RATE: 90 BPM | HEIGHT: 73 IN

## 2021-10-14 DIAGNOSIS — E78.2 MIXED HYPERLIPIDEMIA: ICD-10-CM

## 2021-10-14 DIAGNOSIS — R22.41 LOCALIZED SWELLING, MASS AND LUMP, RIGHT LOWER LIMB: ICD-10-CM

## 2021-10-14 DIAGNOSIS — R10.31 BILATERAL GROIN PAIN: Primary | ICD-10-CM

## 2021-10-14 DIAGNOSIS — I10 BENIGN ESSENTIAL HTN: ICD-10-CM

## 2021-10-14 DIAGNOSIS — E78.1 HYPERTRIGLYCERIDEMIA: ICD-10-CM

## 2021-10-14 DIAGNOSIS — R10.32 BILATERAL GROIN PAIN: Primary | ICD-10-CM

## 2021-10-14 DIAGNOSIS — N18.32 STAGE 3B CHRONIC KIDNEY DISEASE: ICD-10-CM

## 2021-10-14 DIAGNOSIS — R22.42 LOCALIZED SWELLING, MASS AND LUMP, LEFT LOWER LIMB: ICD-10-CM

## 2021-10-14 DIAGNOSIS — E66.01 MORBID OBESITY WITH BMI OF 45.0-49.9, ADULT: ICD-10-CM

## 2021-10-14 DIAGNOSIS — N25.81 SECONDARY HYPERPARATHYROIDISM OF RENAL ORIGIN: ICD-10-CM

## 2021-10-14 LAB
BILIRUB UR QL STRIP: NEGATIVE
CLARITY UR REFRACT.AUTO: CLEAR
COLOR UR AUTO: YELLOW
GLUCOSE UR QL STRIP: NEGATIVE
HGB UR QL STRIP: NEGATIVE
KETONES UR QL STRIP: NEGATIVE
LEUKOCYTE ESTERASE UR QL STRIP: NEGATIVE
NITRITE UR QL STRIP: NEGATIVE
PH UR STRIP: 5 [PH] (ref 5–8)
PROT UR QL STRIP: NEGATIVE
SP GR UR STRIP: 1.01 (ref 1–1.03)
URN SPEC COLLECT METH UR: NORMAL

## 2021-10-14 PROCEDURE — 99214 OFFICE O/P EST MOD 30 MIN: CPT | Mod: PBBFAC | Performed by: INTERNAL MEDICINE

## 2021-10-14 PROCEDURE — 81003 URINALYSIS AUTO W/O SCOPE: CPT | Performed by: INTERNAL MEDICINE

## 2021-10-14 PROCEDURE — 99213 OFFICE O/P EST LOW 20 MIN: CPT | Mod: S$PBB,,, | Performed by: INTERNAL MEDICINE

## 2021-10-14 PROCEDURE — 99213 PR OFFICE/OUTPT VISIT, EST, LEVL III, 20-29 MIN: ICD-10-PCS | Mod: S$PBB,,, | Performed by: INTERNAL MEDICINE

## 2021-10-14 PROCEDURE — 99999 PR PBB SHADOW E&M-EST. PATIENT-LVL IV: ICD-10-PCS | Mod: PBBFAC,,, | Performed by: INTERNAL MEDICINE

## 2021-10-14 PROCEDURE — 99999 PR PBB SHADOW E&M-EST. PATIENT-LVL IV: CPT | Mod: PBBFAC,,, | Performed by: INTERNAL MEDICINE

## 2021-10-14 RX ORDER — METOPROLOL SUCCINATE 25 MG/1
25 TABLET, EXTENDED RELEASE ORAL DAILY
Qty: 90 TABLET | Refills: 3 | Status: SHIPPED | OUTPATIENT
Start: 2021-10-14 | End: 2022-05-16

## 2021-10-14 RX ORDER — AMLODIPINE BESYLATE 10 MG/1
10 TABLET ORAL DAILY
Qty: 90 TABLET | Refills: 3 | Status: SHIPPED | OUTPATIENT
Start: 2021-10-14 | End: 2022-01-13

## 2021-10-14 RX ORDER — HYDRALAZINE HYDROCHLORIDE 50 MG/1
50 TABLET, FILM COATED ORAL EVERY 8 HOURS
Qty: 270 TABLET | Refills: 3 | Status: SHIPPED | OUTPATIENT
Start: 2021-10-14 | End: 2021-10-22 | Stop reason: DRUGHIGH

## 2021-10-14 RX ORDER — OLMESARTAN MEDOXOMIL 40 MG/1
40 TABLET ORAL DAILY
Qty: 90 TABLET | Refills: 3 | Status: SHIPPED | OUTPATIENT
Start: 2021-10-14 | End: 2022-05-16

## 2021-10-14 RX ORDER — ATORVASTATIN CALCIUM 80 MG/1
80 TABLET, FILM COATED ORAL DAILY
Qty: 90 TABLET | Refills: 3 | Status: SHIPPED | OUTPATIENT
Start: 2021-10-14 | End: 2021-11-01

## 2021-10-18 ENCOUNTER — HOSPITAL ENCOUNTER (OUTPATIENT)
Dept: RADIOLOGY | Facility: HOSPITAL | Age: 56
Discharge: HOME | End: 2021-10-18
Attending: INTERNAL MEDICINE
Payer: MEDICARE

## 2021-10-18 DIAGNOSIS — R10.31 BILATERAL GROIN PAIN: ICD-10-CM

## 2021-10-18 DIAGNOSIS — R22.41 LOCALIZED SWELLING, MASS AND LUMP, RIGHT LOWER LIMB: ICD-10-CM

## 2021-10-18 DIAGNOSIS — R10.32 BILATERAL GROIN PAIN: ICD-10-CM

## 2021-10-18 PROCEDURE — 76705 ECHO EXAM OF ABDOMEN: CPT | Mod: 26,,, | Performed by: STUDENT IN AN ORGANIZED HEALTH CARE EDUCATION/TRAINING PROGRAM

## 2021-10-18 PROCEDURE — 76705 US ABDOMEN LIMITED_HERNIA: ICD-10-PCS | Mod: 26,,, | Performed by: STUDENT IN AN ORGANIZED HEALTH CARE EDUCATION/TRAINING PROGRAM

## 2021-10-18 PROCEDURE — 76705 ECHO EXAM OF ABDOMEN: CPT | Mod: TC

## 2021-10-21 ENCOUNTER — PATIENT MESSAGE (OUTPATIENT)
Dept: INTERNAL MEDICINE | Facility: CLINIC | Age: 56
End: 2021-10-21
Payer: MEDICARE

## 2021-10-26 ENCOUNTER — PATIENT OUTREACH (OUTPATIENT)
Dept: ADMINISTRATIVE | Facility: HOSPITAL | Age: 56
End: 2021-10-26
Payer: MEDICARE

## 2021-10-26 ENCOUNTER — TELEPHONE (OUTPATIENT)
Dept: INTERNAL MEDICINE | Facility: CLINIC | Age: 56
End: 2021-10-26
Payer: MEDICARE

## 2021-10-26 DIAGNOSIS — K40.90 LEFT INGUINAL HERNIA: ICD-10-CM

## 2021-10-26 DIAGNOSIS — R22.42 LOCALIZED SWELLING, MASS AND LUMP, LEFT LOWER LIMB: Primary | ICD-10-CM

## 2021-10-30 ENCOUNTER — PATIENT OUTREACH (OUTPATIENT)
Dept: ADMINISTRATIVE | Facility: OTHER | Age: 56
End: 2021-10-30
Payer: MEDICARE

## 2021-11-03 ENCOUNTER — OFFICE VISIT (OUTPATIENT)
Dept: SURGERY | Facility: CLINIC | Age: 56
End: 2021-11-03
Payer: MEDICARE

## 2021-11-03 VITALS
HEIGHT: 73 IN | SYSTOLIC BLOOD PRESSURE: 143 MMHG | DIASTOLIC BLOOD PRESSURE: 91 MMHG | HEART RATE: 88 BPM | TEMPERATURE: 98 F | WEIGHT: 315 LBS | BODY MASS INDEX: 41.75 KG/M2

## 2021-11-03 DIAGNOSIS — I10 BENIGN ESSENTIAL HTN: ICD-10-CM

## 2021-11-03 DIAGNOSIS — K40.90 LEFT INGUINAL HERNIA: Primary | ICD-10-CM

## 2021-11-03 PROCEDURE — 99213 OFFICE O/P EST LOW 20 MIN: CPT | Mod: PBBFAC | Performed by: SURGERY

## 2021-11-03 PROCEDURE — 99999 PR PBB SHADOW E&M-EST. PATIENT-LVL III: CPT | Mod: PBBFAC,,, | Performed by: SURGERY

## 2021-11-03 PROCEDURE — 99204 OFFICE O/P NEW MOD 45 MIN: CPT | Mod: S$PBB,,, | Performed by: SURGERY

## 2021-11-03 PROCEDURE — 99999 PR PBB SHADOW E&M-EST. PATIENT-LVL III: ICD-10-PCS | Mod: PBBFAC,,, | Performed by: SURGERY

## 2021-11-03 PROCEDURE — 99204 PR OFFICE/OUTPT VISIT, NEW, LEVL IV, 45-59 MIN: ICD-10-PCS | Mod: S$PBB,,, | Performed by: SURGERY

## 2021-11-03 RX ORDER — CEFAZOLIN SODIUM 2 G/50ML
2 SOLUTION INTRAVENOUS
Status: CANCELLED | OUTPATIENT
Start: 2021-11-03

## 2021-11-04 ENCOUNTER — TELEPHONE (OUTPATIENT)
Dept: FAMILY MEDICINE | Facility: CLINIC | Age: 56
End: 2021-11-04
Payer: MEDICARE

## 2021-11-12 ENCOUNTER — IMMUNIZATION (OUTPATIENT)
Dept: INTERNAL MEDICINE | Facility: CLINIC | Age: 56
End: 2021-11-12
Payer: MEDICARE

## 2021-11-12 DIAGNOSIS — Z23 NEED FOR VACCINATION: Primary | ICD-10-CM

## 2021-11-12 PROCEDURE — 0034A COVID-19,VECTOR-NR,RS-AD26,PF,0.5 ML DOSE VACCINE (JANSSEN): CPT | Mod: PBBFAC,CV19

## 2021-11-12 PROCEDURE — 91303 COVID-19,VECTOR-NR,RS-AD26,PF,0.5 ML DOSE VACCINE (JANSSEN): CPT | Mod: PBBFAC

## 2021-11-15 ENCOUNTER — OFFICE VISIT (OUTPATIENT)
Dept: INTERNAL MEDICINE | Facility: CLINIC | Age: 56
End: 2021-11-15
Payer: MEDICARE

## 2021-11-15 VITALS
WEIGHT: 315 LBS | SYSTOLIC BLOOD PRESSURE: 138 MMHG | DIASTOLIC BLOOD PRESSURE: 90 MMHG | OXYGEN SATURATION: 96 % | HEIGHT: 73 IN | HEART RATE: 100 BPM | BODY MASS INDEX: 41.75 KG/M2

## 2021-11-15 DIAGNOSIS — K40.90 LEFT INGUINAL HERNIA: ICD-10-CM

## 2021-11-15 DIAGNOSIS — E78.1 HYPERTRIGLYCERIDEMIA: ICD-10-CM

## 2021-11-15 DIAGNOSIS — Z01.818 PRE-OPERATIVE EXAM: Primary | ICD-10-CM

## 2021-11-15 DIAGNOSIS — E78.2 MIXED HYPERLIPIDEMIA: ICD-10-CM

## 2021-11-15 DIAGNOSIS — N18.32 STAGE 3B CHRONIC KIDNEY DISEASE: ICD-10-CM

## 2021-11-15 DIAGNOSIS — I10 BENIGN ESSENTIAL HTN: ICD-10-CM

## 2021-11-15 DIAGNOSIS — E66.01 MORBID OBESITY WITH BMI OF 45.0-49.9, ADULT: ICD-10-CM

## 2021-11-15 DIAGNOSIS — R82.71 BACTERIURIA: ICD-10-CM

## 2021-11-15 LAB
BACTERIA #/AREA URNS AUTO: ABNORMAL /HPF
BILIRUB UR QL STRIP: NEGATIVE
CLARITY UR REFRACT.AUTO: CLEAR
COLOR UR AUTO: YELLOW
CREAT UR-MCNC: 213 MG/DL (ref 23–375)
GLUCOSE UR QL STRIP: NEGATIVE
HGB UR QL STRIP: NEGATIVE
HYALINE CASTS UR QL AUTO: 1 /LPF
KETONES UR QL STRIP: NEGATIVE
LEUKOCYTE ESTERASE UR QL STRIP: ABNORMAL
MICROSCOPIC COMMENT: ABNORMAL
NITRITE UR QL STRIP: NEGATIVE
PH UR STRIP: 5 [PH] (ref 5–8)
PROT UR QL STRIP: NEGATIVE
PROT UR-MCNC: 16 MG/DL (ref 0–15)
PROT/CREAT UR: 0.08 MG/G{CREAT} (ref 0–0.2)
RBC #/AREA URNS AUTO: 2 /HPF (ref 0–4)
SP GR UR STRIP: 1.02 (ref 1–1.03)
SQUAMOUS #/AREA URNS AUTO: 2 /HPF
URN SPEC COLLECT METH UR: ABNORMAL
WBC #/AREA URNS AUTO: 15 /HPF (ref 0–5)

## 2021-11-15 PROCEDURE — 99214 PR OFFICE/OUTPT VISIT, EST, LEVL IV, 30-39 MIN: ICD-10-PCS | Mod: S$PBB,,, | Performed by: INTERNAL MEDICINE

## 2021-11-15 PROCEDURE — 99214 OFFICE O/P EST MOD 30 MIN: CPT | Mod: S$PBB,,, | Performed by: INTERNAL MEDICINE

## 2021-11-15 PROCEDURE — 84156 ASSAY OF PROTEIN URINE: CPT | Performed by: INTERNAL MEDICINE

## 2021-11-15 PROCEDURE — 81001 URINALYSIS AUTO W/SCOPE: CPT | Performed by: INTERNAL MEDICINE

## 2021-11-15 PROCEDURE — 99213 OFFICE O/P EST LOW 20 MIN: CPT | Mod: PBBFAC | Performed by: INTERNAL MEDICINE

## 2021-11-15 PROCEDURE — 99999 PR PBB SHADOW E&M-EST. PATIENT-LVL III: ICD-10-PCS | Mod: PBBFAC,,, | Performed by: INTERNAL MEDICINE

## 2021-11-15 PROCEDURE — 99999 PR PBB SHADOW E&M-EST. PATIENT-LVL III: CPT | Mod: PBBFAC,,, | Performed by: INTERNAL MEDICINE

## 2021-11-16 ENCOUNTER — PATIENT MESSAGE (OUTPATIENT)
Dept: INTERNAL MEDICINE | Facility: CLINIC | Age: 56
End: 2021-11-16
Payer: MEDICARE

## 2021-11-16 ENCOUNTER — LAB VISIT (OUTPATIENT)
Dept: LAB | Facility: HOSPITAL | Age: 56
End: 2021-11-16
Attending: INTERNAL MEDICINE
Payer: MEDICARE

## 2021-11-16 ENCOUNTER — TELEPHONE (OUTPATIENT)
Dept: INTERNAL MEDICINE | Facility: CLINIC | Age: 56
End: 2021-11-16
Payer: MEDICARE

## 2021-11-16 DIAGNOSIS — R82.71 BACTERIURIA: ICD-10-CM

## 2021-11-16 DIAGNOSIS — K40.90 LEFT INGUINAL HERNIA: Primary | ICD-10-CM

## 2021-11-16 PROCEDURE — 81003 URINALYSIS AUTO W/O SCOPE: CPT | Performed by: INTERNAL MEDICINE

## 2021-11-16 RX ORDER — TRAMADOL HYDROCHLORIDE 50 MG/1
50 TABLET ORAL EVERY 12 HOURS PRN
Qty: 12 TABLET | Refills: 0 | Status: ON HOLD | OUTPATIENT
Start: 2021-11-16 | End: 2021-12-16 | Stop reason: HOSPADM

## 2021-12-10 ENCOUNTER — OFFICE VISIT (OUTPATIENT)
Dept: NEPHROLOGY | Facility: CLINIC | Age: 56
End: 2021-12-10
Payer: MEDICARE

## 2021-12-10 ENCOUNTER — RESEARCH ENCOUNTER (OUTPATIENT)
Dept: RESEARCH | Facility: HOSPITAL | Age: 56
End: 2021-12-10
Payer: MEDICARE

## 2021-12-10 VITALS
WEIGHT: 315 LBS | DIASTOLIC BLOOD PRESSURE: 80 MMHG | SYSTOLIC BLOOD PRESSURE: 140 MMHG | OXYGEN SATURATION: 95 % | BODY MASS INDEX: 47 KG/M2 | HEART RATE: 91 BPM

## 2021-12-10 DIAGNOSIS — E55.9 HYPOVITAMINOSIS D: ICD-10-CM

## 2021-12-10 DIAGNOSIS — E21.3 HYPERPARATHYROIDISM: ICD-10-CM

## 2021-12-10 DIAGNOSIS — N18.32 STAGE 3B CHRONIC KIDNEY DISEASE: Primary | ICD-10-CM

## 2021-12-10 DIAGNOSIS — I10 BENIGN ESSENTIAL HTN: ICD-10-CM

## 2021-12-10 DIAGNOSIS — E66.01 MORBID OBESITY: ICD-10-CM

## 2021-12-10 DIAGNOSIS — D64.9 ANEMIA, UNSPECIFIED TYPE: ICD-10-CM

## 2021-12-10 PROCEDURE — 99214 OFFICE O/P EST MOD 30 MIN: CPT | Mod: S$PBB,,, | Performed by: NURSE PRACTITIONER

## 2021-12-10 PROCEDURE — 99999 PR PBB SHADOW E&M-EST. PATIENT-LVL IV: CPT | Mod: PBBFAC,,, | Performed by: NURSE PRACTITIONER

## 2021-12-10 PROCEDURE — 99214 OFFICE O/P EST MOD 30 MIN: CPT | Mod: PBBFAC | Performed by: NURSE PRACTITIONER

## 2021-12-10 PROCEDURE — 99214 PR OFFICE/OUTPT VISIT, EST, LEVL IV, 30-39 MIN: ICD-10-PCS | Mod: S$PBB,,, | Performed by: NURSE PRACTITIONER

## 2021-12-10 PROCEDURE — 99999 PR PBB SHADOW E&M-EST. PATIENT-LVL IV: ICD-10-PCS | Mod: PBBFAC,,, | Performed by: NURSE PRACTITIONER

## 2021-12-15 ENCOUNTER — TELEPHONE (OUTPATIENT)
Dept: SURGERY | Facility: CLINIC | Age: 56
End: 2021-12-15
Payer: MEDICARE

## 2021-12-16 ENCOUNTER — HOSPITAL ENCOUNTER (OUTPATIENT)
Facility: HOSPITAL | Age: 56
Discharge: HOME | End: 2021-12-16
Attending: SURGERY | Admitting: SURGERY
Payer: MEDICARE

## 2021-12-16 ENCOUNTER — ANESTHESIA EVENT (OUTPATIENT)
Dept: SURGERY | Facility: HOSPITAL | Age: 56
End: 2021-12-16
Payer: MEDICARE

## 2021-12-16 ENCOUNTER — ANESTHESIA (OUTPATIENT)
Dept: SURGERY | Facility: HOSPITAL | Age: 56
End: 2021-12-16
Payer: MEDICARE

## 2021-12-16 VITALS
BODY MASS INDEX: 40.43 KG/M2 | RESPIRATION RATE: 17 BRPM | HEIGHT: 74 IN | DIASTOLIC BLOOD PRESSURE: 86 MMHG | SYSTOLIC BLOOD PRESSURE: 165 MMHG | TEMPERATURE: 98 F | OXYGEN SATURATION: 96 % | WEIGHT: 315 LBS | HEART RATE: 80 BPM

## 2021-12-16 DIAGNOSIS — K40.90 LEFT INGUINAL HERNIA: Primary | ICD-10-CM

## 2021-12-16 PROCEDURE — 25000003 PHARM REV CODE 250: Performed by: STUDENT IN AN ORGANIZED HEALTH CARE EDUCATION/TRAINING PROGRAM

## 2021-12-16 PROCEDURE — 37000009 HC ANESTHESIA EA ADD 15 MINS: Performed by: SURGERY

## 2021-12-16 PROCEDURE — 71000015 HC POSTOP RECOV 1ST HR: Performed by: SURGERY

## 2021-12-16 PROCEDURE — D9220A PRA ANESTHESIA: Mod: CRNA,,, | Performed by: NURSE ANESTHETIST, CERTIFIED REGISTERED

## 2021-12-16 PROCEDURE — 37000008 HC ANESTHESIA 1ST 15 MINUTES: Performed by: SURGERY

## 2021-12-16 PROCEDURE — C1781 MESH (IMPLANTABLE): HCPCS | Performed by: SURGERY

## 2021-12-16 PROCEDURE — 63600175 PHARM REV CODE 636 W HCPCS: Performed by: SURGERY

## 2021-12-16 PROCEDURE — 27201423 OPTIME MED/SURG SUP & DEVICES STERILE SUPPLY: Performed by: SURGERY

## 2021-12-16 PROCEDURE — D9220A PRA ANESTHESIA: Mod: ANES,,, | Performed by: ANESTHESIOLOGY

## 2021-12-16 PROCEDURE — 49650 PR LAP,INGUINAL HERNIA REPR,INITIAL: ICD-10-PCS | Mod: LT,GC,, | Performed by: SURGERY

## 2021-12-16 PROCEDURE — 49650 LAP ING HERNIA REPAIR INIT: CPT | Mod: LT,GC,, | Performed by: SURGERY

## 2021-12-16 PROCEDURE — 36000709 HC OR TIME LEV III EA ADD 15 MIN: Performed by: SURGERY

## 2021-12-16 PROCEDURE — 25000003 PHARM REV CODE 250: Performed by: NURSE ANESTHETIST, CERTIFIED REGISTERED

## 2021-12-16 PROCEDURE — D9220A PRA ANESTHESIA: ICD-10-PCS | Mod: CRNA,,, | Performed by: NURSE ANESTHETIST, CERTIFIED REGISTERED

## 2021-12-16 PROCEDURE — 71000016 HC POSTOP RECOV ADDL HR: Performed by: SURGERY

## 2021-12-16 PROCEDURE — 63600175 PHARM REV CODE 636 W HCPCS: Performed by: ANESTHESIOLOGY

## 2021-12-16 PROCEDURE — C1727 CATH, BAL TIS DIS, NON-VAS: HCPCS | Performed by: SURGERY

## 2021-12-16 PROCEDURE — D9220A PRA ANESTHESIA: ICD-10-PCS | Mod: ANES,,, | Performed by: ANESTHESIOLOGY

## 2021-12-16 PROCEDURE — 25000003 PHARM REV CODE 250: Performed by: SURGERY

## 2021-12-16 PROCEDURE — 71000044 HC DOSC ROUTINE RECOVERY FIRST HOUR: Performed by: SURGERY

## 2021-12-16 PROCEDURE — 36000708 HC OR TIME LEV III 1ST 15 MIN: Performed by: SURGERY

## 2021-12-16 PROCEDURE — 63600175 PHARM REV CODE 636 W HCPCS: Performed by: NURSE ANESTHETIST, CERTIFIED REGISTERED

## 2021-12-16 DEVICE — MESH 3DMAX ANAT LG LEFT 4X6IN: Type: IMPLANTABLE DEVICE | Site: GROIN | Status: FUNCTIONAL

## 2021-12-16 RX ORDER — ONDANSETRON 2 MG/ML
4 INJECTION INTRAMUSCULAR; INTRAVENOUS ONCE AS NEEDED
Status: DISCONTINUED | OUTPATIENT
Start: 2021-12-16 | End: 2021-12-16 | Stop reason: HOSPADM

## 2021-12-16 RX ORDER — SUCCINYLCHOLINE CHLORIDE 20 MG/ML
INJECTION INTRAMUSCULAR; INTRAVENOUS
Status: DISCONTINUED | OUTPATIENT
Start: 2021-12-16 | End: 2021-12-16

## 2021-12-16 RX ORDER — LIDOCAINE HYDROCHLORIDE AND EPINEPHRINE 10; 10 MG/ML; UG/ML
INJECTION, SOLUTION INFILTRATION; PERINEURAL
Status: DISCONTINUED | OUTPATIENT
Start: 2021-12-16 | End: 2021-12-16 | Stop reason: HOSPADM

## 2021-12-16 RX ORDER — OXYCODONE AND ACETAMINOPHEN 5; 325 MG/1; MG/1
1 TABLET ORAL EVERY 6 HOURS PRN
Qty: 12 TABLET | Refills: 0 | Status: SHIPPED | OUTPATIENT
Start: 2021-12-16 | End: 2022-02-15

## 2021-12-16 RX ORDER — CEFAZOLIN SODIUM 1 G/3ML
2 INJECTION, POWDER, FOR SOLUTION INTRAMUSCULAR; INTRAVENOUS
Status: COMPLETED | OUTPATIENT
Start: 2021-12-16 | End: 2021-12-16

## 2021-12-16 RX ORDER — LIDOCAINE HCL/PF 100 MG/5ML
SYRINGE (ML) INTRAVENOUS
Status: DISCONTINUED | OUTPATIENT
Start: 2021-12-16 | End: 2021-12-16

## 2021-12-16 RX ORDER — KETAMINE HCL IN 0.9 % NACL 50 MG/5 ML
SYRINGE (ML) INTRAVENOUS
Status: DISCONTINUED | OUTPATIENT
Start: 2021-12-16 | End: 2021-12-16

## 2021-12-16 RX ORDER — HYDROMORPHONE HYDROCHLORIDE 1 MG/ML
0.2 INJECTION, SOLUTION INTRAMUSCULAR; INTRAVENOUS; SUBCUTANEOUS EVERY 5 MIN PRN
Status: DISCONTINUED | OUTPATIENT
Start: 2021-12-16 | End: 2021-12-16 | Stop reason: HOSPADM

## 2021-12-16 RX ORDER — OXYCODONE AND ACETAMINOPHEN 5; 325 MG/1; MG/1
1 TABLET ORAL EVERY 4 HOURS PRN
Status: DISCONTINUED | OUTPATIENT
Start: 2021-12-16 | End: 2021-12-16 | Stop reason: HOSPADM

## 2021-12-16 RX ORDER — PHENYLEPHRINE HCL IN 0.9% NACL 1 MG/10 ML
SYRINGE (ML) INTRAVENOUS
Status: DISCONTINUED | OUTPATIENT
Start: 2021-12-16 | End: 2021-12-16

## 2021-12-16 RX ORDER — ROCURONIUM BROMIDE 10 MG/ML
INJECTION, SOLUTION INTRAVENOUS
Status: DISCONTINUED | OUTPATIENT
Start: 2021-12-16 | End: 2021-12-16

## 2021-12-16 RX ORDER — MIDAZOLAM HYDROCHLORIDE 1 MG/ML
INJECTION INTRAMUSCULAR; INTRAVENOUS
Status: DISCONTINUED | OUTPATIENT
Start: 2021-12-16 | End: 2021-12-16

## 2021-12-16 RX ORDER — BUPIVACAINE HYDROCHLORIDE 2.5 MG/ML
INJECTION, SOLUTION EPIDURAL; INFILTRATION; INTRACAUDAL
Status: DISCONTINUED | OUTPATIENT
Start: 2021-12-16 | End: 2021-12-16 | Stop reason: HOSPADM

## 2021-12-16 RX ORDER — PROPOFOL 10 MG/ML
VIAL (ML) INTRAVENOUS
Status: DISCONTINUED | OUTPATIENT
Start: 2021-12-16 | End: 2021-12-16

## 2021-12-16 RX ORDER — ONDANSETRON 2 MG/ML
INJECTION INTRAMUSCULAR; INTRAVENOUS
Status: DISCONTINUED | OUTPATIENT
Start: 2021-12-16 | End: 2021-12-16

## 2021-12-16 RX ORDER — POLYETHYLENE GLYCOL 3350 17 G/17G
17 POWDER, FOR SOLUTION ORAL DAILY
Refills: 0 | COMMUNITY
Start: 2021-12-16 | End: 2022-02-15

## 2021-12-16 RX ORDER — FENTANYL CITRATE 50 UG/ML
INJECTION, SOLUTION INTRAMUSCULAR; INTRAVENOUS
Status: DISCONTINUED | OUTPATIENT
Start: 2021-12-16 | End: 2021-12-16

## 2021-12-16 RX ORDER — NEOSTIGMINE METHYLSULFATE 0.5 MG/ML
INJECTION, SOLUTION INTRAVENOUS
Status: DISCONTINUED | OUTPATIENT
Start: 2021-12-16 | End: 2021-12-16

## 2021-12-16 RX ORDER — FENTANYL CITRATE 50 UG/ML
25 INJECTION, SOLUTION INTRAMUSCULAR; INTRAVENOUS EVERY 5 MIN PRN
Status: DISCONTINUED | OUTPATIENT
Start: 2021-12-16 | End: 2021-12-16 | Stop reason: HOSPADM

## 2021-12-16 RX ADMIN — LIDOCAINE HYDROCHLORIDE 100 MG: 20 INJECTION, SOLUTION INTRAVENOUS at 11:12

## 2021-12-16 RX ADMIN — SODIUM CHLORIDE: 0.9 INJECTION, SOLUTION INTRAVENOUS at 10:12

## 2021-12-16 RX ADMIN — FENTANYL CITRATE 100 MCG: 50 INJECTION, SOLUTION INTRAMUSCULAR; INTRAVENOUS at 11:12

## 2021-12-16 RX ADMIN — FENTANYL CITRATE 25 MCG: 50 INJECTION INTRAMUSCULAR; INTRAVENOUS at 01:12

## 2021-12-16 RX ADMIN — CEFAZOLIN 3 G: 330 INJECTION, POWDER, FOR SOLUTION INTRAMUSCULAR; INTRAVENOUS at 11:12

## 2021-12-16 RX ADMIN — OXYCODONE HYDROCHLORIDE AND ACETAMINOPHEN 1 TABLET: 5; 325 TABLET ORAL at 01:12

## 2021-12-16 RX ADMIN — GLYCOPYRROLATE 0.6 MG: 0.2 INJECTION, SOLUTION INTRAMUSCULAR; INTRAVITREAL at 12:12

## 2021-12-16 RX ADMIN — MIDAZOLAM HYDROCHLORIDE 2 MG: 1 INJECTION, SOLUTION INTRAMUSCULAR; INTRAVENOUS at 11:12

## 2021-12-16 RX ADMIN — ONDANSETRON 4 MG: 2 INJECTION INTRAMUSCULAR; INTRAVENOUS at 12:12

## 2021-12-16 RX ADMIN — ROCURONIUM BROMIDE 10 MG: 10 INJECTION, SOLUTION INTRAVENOUS at 11:12

## 2021-12-16 RX ADMIN — NEOSTIGMINE METHYLSULFATE 5 MG: 0.5 INJECTION INTRAVENOUS at 12:12

## 2021-12-16 RX ADMIN — PROPOFOL 200 MG: 10 INJECTION, EMULSION INTRAVENOUS at 11:12

## 2021-12-16 RX ADMIN — Medication 25 MG: at 11:12

## 2021-12-16 RX ADMIN — SUCCINYLCHOLINE CHLORIDE 140 MG: 20 INJECTION, SOLUTION INTRAMUSCULAR; INTRAVENOUS at 11:12

## 2021-12-16 RX ADMIN — Medication 200 MCG: at 11:12

## 2021-12-16 RX ADMIN — ROCURONIUM BROMIDE 40 MG: 10 INJECTION, SOLUTION INTRAVENOUS at 11:12

## 2021-12-29 ENCOUNTER — OFFICE VISIT (OUTPATIENT)
Dept: SURGERY | Facility: CLINIC | Age: 56
End: 2021-12-29
Payer: MEDICARE

## 2021-12-29 VITALS
WEIGHT: 315 LBS | SYSTOLIC BLOOD PRESSURE: 138 MMHG | BODY MASS INDEX: 36.45 KG/M2 | HEART RATE: 89 BPM | DIASTOLIC BLOOD PRESSURE: 92 MMHG | HEIGHT: 78 IN

## 2021-12-29 DIAGNOSIS — Z09 POSTOP CHECK: Primary | ICD-10-CM

## 2021-12-29 PROBLEM — K40.90 LEFT INGUINAL HERNIA: Status: RESOLVED | Noted: 2021-12-16 | Resolved: 2021-12-29

## 2021-12-29 PROCEDURE — 99999 PR PBB SHADOW E&M-EST. PATIENT-LVL III: ICD-10-PCS | Mod: PBBFAC,,, | Performed by: SURGERY

## 2021-12-29 PROCEDURE — 99213 OFFICE O/P EST LOW 20 MIN: CPT | Mod: PBBFAC | Performed by: SURGERY

## 2021-12-29 PROCEDURE — 99024 PR POST-OP FOLLOW-UP VISIT: ICD-10-PCS | Mod: POP,,, | Performed by: SURGERY

## 2021-12-29 PROCEDURE — 99024 POSTOP FOLLOW-UP VISIT: CPT | Mod: POP,,, | Performed by: SURGERY

## 2021-12-29 PROCEDURE — 99999 PR PBB SHADOW E&M-EST. PATIENT-LVL III: CPT | Mod: PBBFAC,,, | Performed by: SURGERY

## 2022-01-05 ENCOUNTER — PATIENT MESSAGE (OUTPATIENT)
Dept: NEPHROLOGY | Facility: CLINIC | Age: 57
End: 2022-01-05
Payer: MEDICARE

## 2022-01-13 DIAGNOSIS — I10 BENIGN ESSENTIAL HTN: ICD-10-CM

## 2022-01-13 RX ORDER — AMLODIPINE BESYLATE 10 MG/1
TABLET ORAL
Qty: 90 TABLET | Refills: 3 | Status: SHIPPED | OUTPATIENT
Start: 2022-01-13 | End: 2022-11-24

## 2022-01-13 NOTE — TELEPHONE ENCOUNTER
No new care gaps identified.  Powered by [employer]. Reference number: [id number].   1/13/2022 3:15:44 AM CST

## 2022-02-15 ENCOUNTER — OFFICE VISIT (OUTPATIENT)
Dept: INTERNAL MEDICINE | Facility: CLINIC | Age: 57
End: 2022-02-15
Payer: MEDICARE

## 2022-02-15 ENCOUNTER — LAB VISIT (OUTPATIENT)
Dept: LAB | Facility: HOSPITAL | Age: 57
End: 2022-02-15
Payer: MEDICARE

## 2022-02-15 VITALS
DIASTOLIC BLOOD PRESSURE: 96 MMHG | HEART RATE: 97 BPM | BODY MASS INDEX: 41.45 KG/M2 | WEIGHT: 315 LBS | OXYGEN SATURATION: 97 % | SYSTOLIC BLOOD PRESSURE: 140 MMHG

## 2022-02-15 DIAGNOSIS — I1A.0 RESISTANT HYPERTENSION: ICD-10-CM

## 2022-02-15 DIAGNOSIS — N18.32 STAGE 3B CHRONIC KIDNEY DISEASE: ICD-10-CM

## 2022-02-15 DIAGNOSIS — R73.9 HYPERGLYCEMIA, UNSPECIFIED: ICD-10-CM

## 2022-02-15 DIAGNOSIS — I10 BENIGN ESSENTIAL HTN: ICD-10-CM

## 2022-02-15 DIAGNOSIS — E66.01 MORBID OBESITY WITH BMI OF 45.0-49.9, ADULT: ICD-10-CM

## 2022-02-15 DIAGNOSIS — E66.01 MORBID OBESITY WITH BMI OF 45.0-49.9, ADULT: Primary | ICD-10-CM

## 2022-02-15 LAB
ANION GAP SERPL CALC-SCNC: 10 MMOL/L (ref 8–16)
BUN SERPL-MCNC: 23 MG/DL (ref 6–20)
CALCIUM SERPL-MCNC: 9.8 MG/DL (ref 8.7–10.5)
CHLORIDE SERPL-SCNC: 102 MMOL/L (ref 95–110)
CO2 SERPL-SCNC: 28 MMOL/L (ref 23–29)
CREAT SERPL-MCNC: 2.4 MG/DL (ref 0.5–1.4)
EST. GFR  (AFRICAN AMERICAN): 34.1 ML/MIN/1.73 M^2
EST. GFR  (NON AFRICAN AMERICAN): 29.5 ML/MIN/1.73 M^2
ESTIMATED AVG GLUCOSE: 94 MG/DL (ref 68–131)
GLUCOSE SERPL-MCNC: 125 MG/DL (ref 70–110)
HBA1C MFR BLD: 4.9 % (ref 4–5.6)
POTASSIUM SERPL-SCNC: 3.8 MMOL/L (ref 3.5–5.1)
SODIUM SERPL-SCNC: 140 MMOL/L (ref 136–145)

## 2022-02-15 PROCEDURE — 99213 OFFICE O/P EST LOW 20 MIN: CPT | Mod: S$PBB,,, | Performed by: INTERNAL MEDICINE

## 2022-02-15 PROCEDURE — 99999 PR PBB SHADOW E&M-EST. PATIENT-LVL III: CPT | Mod: PBBFAC,,, | Performed by: INTERNAL MEDICINE

## 2022-02-15 PROCEDURE — 83036 HEMOGLOBIN GLYCOSYLATED A1C: CPT | Performed by: INTERNAL MEDICINE

## 2022-02-15 PROCEDURE — 36415 COLL VENOUS BLD VENIPUNCTURE: CPT | Performed by: INTERNAL MEDICINE

## 2022-02-15 PROCEDURE — 99213 OFFICE O/P EST LOW 20 MIN: CPT | Mod: PBBFAC | Performed by: INTERNAL MEDICINE

## 2022-02-15 PROCEDURE — 99213 PR OFFICE/OUTPT VISIT, EST, LEVL III, 20-29 MIN: ICD-10-PCS | Mod: S$PBB,,, | Performed by: INTERNAL MEDICINE

## 2022-02-15 PROCEDURE — 80048 BASIC METABOLIC PNL TOTAL CA: CPT | Performed by: INTERNAL MEDICINE

## 2022-02-15 PROCEDURE — 99999 PR PBB SHADOW E&M-EST. PATIENT-LVL III: ICD-10-PCS | Mod: PBBFAC,,, | Performed by: INTERNAL MEDICINE

## 2022-02-15 RX ORDER — ACETAMINOPHEN 500 MG
1 TABLET ORAL DAILY
COMMUNITY

## 2022-02-15 RX ORDER — HYDRALAZINE HYDROCHLORIDE 100 MG/1
100 TABLET, FILM COATED ORAL EVERY 8 HOURS
Qty: 270 TABLET | Refills: 3 | Status: SHIPPED | OUTPATIENT
Start: 2022-02-15 | End: 2023-08-15 | Stop reason: SDUPTHER

## 2022-02-15 NOTE — PROGRESS NOTES
Subjective:       Patient ID: Fausto Pandey is a 56 y.o. male.    Chief Complaint: Follow-up      Fausto Pandey is a 56 y.o. year old male    HPI   56 y.o. with  HTN, HLD, L inguinal hernia s/p repair (12/16/2021) presents for follow up. Sleeping on 4 pillows (baseline). Weight gain progressively since start of pandemic. CKD IIIb progressing to IV. Reports baseline fatigue, states his wife hasn't told him he stops breathing. Does snore. Has always slept on 4 pillows all his life. Has always been active growing up, played baseball and football. Son plays soccer.     Review of Systems   Constitutional: Negative for activity change, appetite change, chills, fatigue, fever and unexpected weight change.   HENT: Negative for congestion, rhinorrhea and sore throat.    Eyes: Negative for visual disturbance.   Respiratory: Negative for shortness of breath.    Cardiovascular: Negative for chest pain.   Gastrointestinal: Negative for abdominal pain, diarrhea, nausea and vomiting.   Genitourinary: Negative for difficulty urinating and dysuria.   Musculoskeletal: Negative for arthralgias, back pain and myalgias.   Skin: Negative for color change and rash.   Neurological: Negative for dizziness, weakness and headaches.         Past Medical History:   Diagnosis Date    Benign essential HTN 8/22/2017    Chronic kidney disease, stage III (moderate) 8/22/2017    Internal derangement of left knee 10/9/2017        Prior to Admission medications    Medication Sig Start Date End Date Taking? Authorizing Provider   acetaminophen (TYLENOL) 500 MG tablet Take 1 tablet (500 mg total) by mouth every 6 (six) hours as needed for Pain. 6/29/19   Shayna Mitchell MD   amLODIPine (NORVASC) 10 MG tablet TAKE 1 TABLET(10 MG) BY MOUTH EVERY DAY 1/13/22   Ivan Thornton MD   atorvastatin (LIPITOR) 80 MG tablet TAKE 1 TABLET(80 MG) BY MOUTH EVERY DAY 11/1/21   Brisa Lynn MD   cetirizine (ZYRTEC) 10 MG tablet Take 1 tablet (10 mg  total) by mouth 2 (two) times daily. 12/13/18   Enio Sanchez MD   ergocalciferol (ERGOCALCIFEROL) 50,000 unit Cap Take 1 capsule (50,000 Units total) by mouth every 7 days. 4/27/21   Roxanna Ibarra NP   hydrALAZINE (APRESOLINE) 100 MG tablet Take 1 tablet (100 mg total) by mouth 2 (two) times daily. 10/22/21 10/22/22  Brisa Lynn MD   metoprolol succinate (TOPROL-XL) 25 MG 24 hr tablet Take 1 tablet (25 mg total) by mouth once daily. 10/14/21 10/14/22  Ivan Thornton MD   olmesartan (BENICAR) 40 MG tablet Take 1 tablet (40 mg total) by mouth once daily. 10/14/21 10/14/22  Ivan Thornton MD   omeprazole (PRILOSEC) 40 MG capsule Take 1 capsule (40 mg total) by mouth once daily. 4/20/21 5/20/21  Jose Ramon Barker MD   oxyCODONE-acetaminophen (PERCOCET) 5-325 mg per tablet Take 1 tablet by mouth every 6 (six) hours as needed for Pain. 12/16/21   Enio Moya MD   polyethylene glycol (GLYCOLAX) 17 gram PwPk Take 17 g by mouth once daily. Take while on narcotic pain medications to avoid straining/constipation 12/16/21   Enio Moya MD        Past medical history, surgical history, and family medical history reviewed and updated as appropriate.    Medications and allergies reviewed.     Objective:          Vitals:    02/15/22 0940   BP: (!) 140/96   BP Location: Left arm   Patient Position: Sitting   BP Method: Large (Manual)   Pulse: 97   SpO2: 97%   Weight: (!) 162.7 kg (358 lb 11 oz)     Body mass index is 41.45 kg/m².  Physical Exam  Constitutional:       General: He is not in acute distress.     Appearance: He is well-developed and well-nourished.   HENT:      Head: Normocephalic and atraumatic.      Nose: Nose normal. No congestion or rhinorrhea.      Mouth/Throat:      Mouth: Oropharynx is clear and moist.   Eyes:      General: No scleral icterus.     Extraocular Movements: Extraocular movements intact and EOM normal.   Neck:      Thyroid: No thyromegaly.      Vascular: No JVD.      Trachea: No  tracheal deviation.   Cardiovascular:      Rate and Rhythm: Normal rate and regular rhythm.      Heart sounds: Normal heart sounds. No murmur heard.  No friction rub. No gallop.    Pulmonary:      Effort: Pulmonary effort is normal. No respiratory distress.      Breath sounds: Rhonchi (diffuse across all lung fields) present. No wheezing or rales.   Abdominal:      General: Bowel sounds are normal. There is no distension.      Palpations: Abdomen is soft. There is no mass.      Tenderness: There is no abdominal tenderness.   Musculoskeletal:         General: No tenderness or edema. Normal range of motion.      Cervical back: Normal range of motion and neck supple.   Lymphadenopathy:      Cervical: No cervical adenopathy.   Skin:     General: Skin is warm and dry.      Findings: No rash.   Neurological:      Mental Status: He is alert and oriented to person, place, and time.      Cranial Nerves: No cranial nerve deficit.      Deep Tendon Reflexes: Reflexes normal.   Psychiatric:         Mood and Affect: Mood and affect normal.         Behavior: Behavior normal.         Lab Results   Component Value Date    WBC 6.60 10/18/2021    HGB 13.5 (L) 10/18/2021    HCT 41.3 10/18/2021     10/18/2021    CHOL 130 10/18/2021    TRIG 142 10/18/2021    HDL 28 (L) 10/18/2021    ALT 22 10/18/2021    AST 27 10/18/2021     10/18/2021    K 3.9 10/18/2021     10/18/2021    CREATININE 2.7 (H) 10/18/2021    BUN 23 (H) 10/18/2021    CO2 27 10/18/2021    TSH 1.709 10/20/2020    HGBA1C 4.9 10/20/2020       Assessment:       1. Morbid obesity with BMI of 45.0-49.9, adult    2. Benign essential HTN    3. Stage 3b chronic kidney disease    4. Resistant hypertension    5. Hyperglycemia, unspecified           Plan:     Fausto was seen today for follow-up.    Diagnoses and all orders for this visit:    Morbid obesity with BMI of 45.0-49.9, adult  -     Hemoglobin A1C; Future    Benign essential HTN  Comments:  uncontrolled,  worsening, BP logs, nurse visit 2 weeks, increase hydralazine to 100 mg TID  Orders:  -     hydrALAZINE (APRESOLINE) 100 MG tablet; Take 1 tablet (100 mg total) by mouth every 8 (eight) hours.  -     Ambulatory referral/consult to Sleep Disorders; Future    Stage 3b chronic kidney disease  -     Basic Metabolic Panel; Future    Resistant hypertension  -     Ambulatory referral/consult to Sleep Disorders; Future  -     Basic Metabolic Panel; Future    Hyperglycemia, unspecified   -     Hemoglobin A1C; Future    Suspected sleep apnea - resistant hypertension, increasing medications, comorbidities of uncontrolled blood pressure present - worsening renal function.     Health maintenance reviewed with patient.     Follow up in about 6 months (around 8/15/2022).    Ivan Thornton MD  Internal Medicine / Primary Care  [facility]  2/15/2022

## 2022-02-15 NOTE — PATIENT INSTRUCTIONS
Increase hydralazine to 100 mg three times daily  BP logs x 2 weeks  Nurse visit in 2 weeks with results of blood pressure logs to be reviewed    Referral to sleep medicine placed for evaluation of possible sleep apnea making your blood pressure more difficult to control.    Return to clinic in 6 months.

## 2022-02-17 ENCOUNTER — PES CALL (OUTPATIENT)
Dept: ADMINISTRATIVE | Facility: CLINIC | Age: 57
End: 2022-02-17
Payer: MEDICARE

## 2022-03-02 ENCOUNTER — PATIENT OUTREACH (OUTPATIENT)
Dept: ADMINISTRATIVE | Facility: OTHER | Age: 57
End: 2022-03-02
Payer: MEDICARE

## 2022-03-10 ENCOUNTER — PATIENT MESSAGE (OUTPATIENT)
Dept: NEPHROLOGY | Facility: CLINIC | Age: 57
End: 2022-03-10
Payer: MEDICARE

## 2022-03-21 ENCOUNTER — PATIENT OUTREACH (OUTPATIENT)
Dept: ADMINISTRATIVE | Facility: HOSPITAL | Age: 57
End: 2022-03-21
Payer: MEDICARE

## 2022-03-21 NOTE — PROGRESS NOTES
[facility] CMS chart audits. (FLU VACCINE) Chart review completed for HM test overdue (mammograms, Colonoscopies, pap smears, DM labs, and/or EYE EXAMs)       Care Everywhere and media, updates requested and reviewed.             There is no record of a Flu vaccine noted for the patient for the 2021 measurement year.

## 2022-04-01 ENCOUNTER — LAB VISIT (OUTPATIENT)
Dept: LAB | Facility: OTHER | Age: 57
End: 2022-04-01
Attending: NURSE PRACTITIONER
Payer: MEDICARE

## 2022-04-01 DIAGNOSIS — N18.32 STAGE 3B CHRONIC KIDNEY DISEASE: ICD-10-CM

## 2022-04-01 LAB
ALBUMIN SERPL BCP-MCNC: 3.5 G/DL (ref 3.5–5.2)
ANION GAP SERPL CALC-SCNC: 12 MMOL/L (ref 8–16)
BASOPHILS # BLD AUTO: 0.06 K/UL (ref 0–0.2)
BASOPHILS NFR BLD: 0.7 % (ref 0–1.9)
BUN SERPL-MCNC: 22 MG/DL (ref 6–20)
CALCIUM SERPL-MCNC: 9.1 MG/DL (ref 8.7–10.5)
CHLORIDE SERPL-SCNC: 103 MMOL/L (ref 95–110)
CO2 SERPL-SCNC: 26 MMOL/L (ref 23–29)
CREAT SERPL-MCNC: 2.4 MG/DL (ref 0.5–1.4)
DIFFERENTIAL METHOD: ABNORMAL
EOSINOPHIL # BLD AUTO: 0.5 K/UL (ref 0–0.5)
EOSINOPHIL NFR BLD: 6.6 % (ref 0–8)
ERYTHROCYTE [DISTWIDTH] IN BLOOD BY AUTOMATED COUNT: 13.2 % (ref 11.5–14.5)
EST. GFR  (AFRICAN AMERICAN): 34 ML/MIN/1.73 M^2
EST. GFR  (NON AFRICAN AMERICAN): 29 ML/MIN/1.73 M^2
GLUCOSE SERPL-MCNC: 94 MG/DL (ref 70–110)
HCT VFR BLD AUTO: 44.9 % (ref 40–54)
HGB BLD-MCNC: 14.7 G/DL (ref 14–18)
IMM GRANULOCYTES # BLD AUTO: 0.01 K/UL (ref 0–0.04)
IMM GRANULOCYTES NFR BLD AUTO: 0.1 % (ref 0–0.5)
LYMPHOCYTES # BLD AUTO: 4.2 K/UL (ref 1–4.8)
LYMPHOCYTES NFR BLD: 51.4 % (ref 18–48)
MCH RBC QN AUTO: 33.9 PG (ref 27–31)
MCHC RBC AUTO-ENTMCNC: 32.7 G/DL (ref 32–36)
MCV RBC AUTO: 104 FL (ref 82–98)
MONOCYTES # BLD AUTO: 0.7 K/UL (ref 0.3–1)
MONOCYTES NFR BLD: 8.7 % (ref 4–15)
NEUTROPHILS # BLD AUTO: 2.6 K/UL (ref 1.8–7.7)
NEUTROPHILS NFR BLD: 32.5 % (ref 38–73)
NRBC BLD-RTO: 0 /100 WBC
PHOSPHATE SERPL-MCNC: 3.8 MG/DL (ref 2.7–4.5)
PLATELET # BLD AUTO: 224 K/UL (ref 150–450)
PMV BLD AUTO: 11 FL (ref 9.2–12.9)
POTASSIUM SERPL-SCNC: 3.7 MMOL/L (ref 3.5–5.1)
PTH-INTACT SERPL-MCNC: 147.6 PG/ML (ref 9–77)
RBC # BLD AUTO: 4.33 M/UL (ref 4.6–6.2)
SODIUM SERPL-SCNC: 141 MMOL/L (ref 136–145)
WBC # BLD AUTO: 8.15 K/UL (ref 3.9–12.7)

## 2022-04-01 PROCEDURE — 36415 COLL VENOUS BLD VENIPUNCTURE: CPT | Performed by: NURSE PRACTITIONER

## 2022-04-01 PROCEDURE — 85025 COMPLETE CBC W/AUTO DIFF WBC: CPT | Performed by: NURSE PRACTITIONER

## 2022-04-01 PROCEDURE — 80069 RENAL FUNCTION PANEL: CPT | Performed by: NURSE PRACTITIONER

## 2022-04-01 PROCEDURE — 83970 ASSAY OF PARATHORMONE: CPT | Performed by: NURSE PRACTITIONER

## 2022-04-06 ENCOUNTER — HOSPITAL ENCOUNTER (EMERGENCY)
Facility: HOSPITAL | Age: 57
Discharge: HOME | End: 2022-04-06
Attending: EMERGENCY MEDICINE
Payer: MEDICARE

## 2022-04-06 VITALS
HEART RATE: 89 BPM | SYSTOLIC BLOOD PRESSURE: 165 MMHG | OXYGEN SATURATION: 98 % | DIASTOLIC BLOOD PRESSURE: 103 MMHG | TEMPERATURE: 98 F | RESPIRATION RATE: 18 BRPM

## 2022-04-06 DIAGNOSIS — M54.50 ACUTE RIGHT-SIDED LOW BACK PAIN WITHOUT SCIATICA: Primary | ICD-10-CM

## 2022-04-06 PROCEDURE — 99284 EMERGENCY DEPT VISIT MOD MDM: CPT | Mod: ,,, | Performed by: EMERGENCY MEDICINE

## 2022-04-06 PROCEDURE — 25000003 PHARM REV CODE 250: Performed by: PHYSICIAN ASSISTANT

## 2022-04-06 PROCEDURE — 99284 EMERGENCY DEPT VISIT MOD MDM: CPT

## 2022-04-06 PROCEDURE — 99284 PR EMERGENCY DEPT VISIT,LEVEL IV: ICD-10-PCS | Mod: ,,, | Performed by: EMERGENCY MEDICINE

## 2022-04-06 RX ORDER — ACETAMINOPHEN 500 MG
1000 TABLET ORAL
Status: COMPLETED | OUTPATIENT
Start: 2022-04-06 | End: 2022-04-06

## 2022-04-06 RX ORDER — LIDOCAINE 50 MG/G
1 PATCH TOPICAL DAILY
Qty: 10 PATCH | Refills: 0 | Status: SHIPPED | OUTPATIENT
Start: 2022-04-06 | End: 2023-08-15

## 2022-04-06 RX ORDER — METHOCARBAMOL 750 MG/1
750 TABLET, FILM COATED ORAL
Status: COMPLETED | OUTPATIENT
Start: 2022-04-06 | End: 2022-04-06

## 2022-04-06 RX ORDER — LIDOCAINE 50 MG/G
1 PATCH TOPICAL
Status: DISCONTINUED | OUTPATIENT
Start: 2022-04-06 | End: 2022-04-06 | Stop reason: HOSPADM

## 2022-04-06 RX ORDER — METHOCARBAMOL 750 MG/1
750 TABLET, FILM COATED ORAL 3 TIMES DAILY PRN
Qty: 15 TABLET | Refills: 0 | Status: SHIPPED | OUTPATIENT
Start: 2022-04-06 | End: 2022-04-28 | Stop reason: SDUPTHER

## 2022-04-06 RX ADMIN — LIDOCAINE 1 PATCH: 50 PATCH CUTANEOUS at 10:04

## 2022-04-06 RX ADMIN — METHOCARBAMOL 750 MG: 750 TABLET ORAL at 10:04

## 2022-04-06 RX ADMIN — ACETAMINOPHEN 1000 MG: 500 TABLET ORAL at 10:04

## 2022-04-06 NOTE — DISCHARGE INSTRUCTIONS
We will treat your low back pain with tylenol, robaxin, and lidoderm patches. Take medication as needed for pain. Robaxin is a muscle relaxant and may make you drowsy. Use ice to the area but avoid heat. Follow up with your primary doctor or return to the ER for any new or worsening symptoms.

## 2022-04-06 NOTE — ED TRIAGE NOTES
Patient reports to the ED today with reports of back pain onset x2 days ago    Patient identifiers verified and correct for Fausto Pandey  LOC: The patient is awake, alert and aware of environment with an appropriate affect, the patient is oriented x 3 and speaking appropriately.   APPEARANCE: Patient appears comfortable and in no acute distress, patient is clean and well groomed.  SKIN: The skin is warm and dry, color consistent with ethnicity, patient has normal skin turgor and moist mucus membranes, skin intact, no breakdown or bruising noted.   MUSCULOSKELETAL: Patient moving all extremities spontaneously, no swelling noted. Back pain  RESPIRATORY: Airway is open and patent, respirations are spontaneous, patient has a normal effort and rate, no accessory muscle use noted, O2 sats noted at 98% on room air.  CARDIAC: Denies chest pain  GASTRO: Soft and non tender to palpation, no distention noted, normoactive bowel sounds present in all four quadrants. Pt states bowel movements have been regular.  : Pt denies any pain or frequency with urination.  NEURO: Pt opens eyes spontaneously, behavior appropriate to situation, follows commands, facial expression symmetrical, bilateral hand grasp equal and even, purposeful motor response noted, normal sensation in all extremities when touched with a finger.

## 2022-04-06 NOTE — ED PROVIDER NOTES
Encounter Date: 4/6/2022       History     Chief Complaint   Patient presents with    Back Pain     Right lower back pain started yesterday, denies any trauma or injury      56-year-old male with past medical history of obesity, hypertension, CKD stage 3 who presents to the emergency department with chief complaint of right-sided low back pain that began yesterday.  He reports doing yard work a few days prior to onset of symptoms.  His pain has been relatively constant.  Describes as achy.  It does not radiate.  No associated lower extremity weakness, numbness, tingling, saddle anesthesia.  Denies chest pain, shortness of breath, abdominal pain, vomiting, hematuria, dysuria.  Took Tylenol at home with little improvement.  He denies any other worsening or alleviating factors.        Review of patient's allergies indicates:   Allergen Reactions    Spironolactone Other (See Comments)     Breast tenderness    Ibuprofen Other (See Comments)     Unable to take Ibuprofen due to decreased kidney function     Past Medical History:   Diagnosis Date    Benign essential HTN 8/22/2017    Chronic kidney disease, stage III (moderate) 8/22/2017    Internal derangement of left knee 10/9/2017     No past surgical history on file.  Family History   Problem Relation Age of Onset    Hypertension Mother     Hypertension Father     Hypertension Brother      Social History     Tobacco Use    Smoking status: Current Every Day Smoker     Packs/day: 0.30     Types: Cigarettes    Smokeless tobacco: Never Used   Substance Use Topics    Alcohol use: Never    Drug use: Yes     Types: Marijuana     Comment: daily     Review of Systems   Constitutional: Negative for fever.   HENT: Negative for sore throat.    Respiratory: Negative for shortness of breath.    Cardiovascular: Negative for chest pain.   Gastrointestinal: Negative for nausea.   Genitourinary: Negative for dysuria.   Musculoskeletal: Positive for back pain.   Skin: Negative  for rash.   Neurological: Negative for weakness.   Hematological: Does not bruise/bleed easily.       Physical Exam     Initial Vitals [04/06/22 0858]   BP Pulse Resp Temp SpO2   (!) 165/103 89 18 98.1 °F (36.7 °C) 98 %      MAP       --         Physical Exam    Nursing note and vitals reviewed.  Constitutional: He appears well-developed and well-nourished. He is not diaphoretic. No distress.   HENT:   Head: Normocephalic and atraumatic.   Mouth/Throat: Oropharynx is clear and moist.   Eyes: EOM are normal. Pupils are equal, round, and reactive to light.   Neck: Neck supple.   Normal range of motion.  Cardiovascular: Normal rate, regular rhythm, normal heart sounds and intact distal pulses. Exam reveals no gallop and no friction rub.    No murmur heard.  Pulmonary/Chest: Breath sounds normal. He has no wheezes. He has no rhonchi. He has no rales.   Abdominal: Abdomen is soft. Bowel sounds are normal. There is no abdominal tenderness.   Musculoskeletal:         General: Tenderness present. Normal range of motion.      Cervical back: Normal range of motion and neck supple.      Comments: Tenderness to palpation of right-sided paraspinous musculature of the lumbar region.  No midline tenderness to palpation of cervical, thoracic, lumbar spine.  Strength 5/5 bilateral lower extremities.  Sensation intact.  2+ distal pulses.     Neurological: He is alert and oriented to person, place, and time. He has normal strength. GCS score is 15. GCS eye subscore is 4. GCS verbal subscore is 5. GCS motor subscore is 6.   Skin: Skin is warm and dry. Capillary refill takes less than 2 seconds.   Psychiatric: He has a normal mood and affect. His behavior is normal. Judgment and thought content normal.         ED Course   Procedures  Labs Reviewed - No data to display            Medications   acetaminophen tablet 1,000 mg (1,000 mg Oral Given 4/6/22 1012)   methocarbamoL tablet 750 mg (750 mg Oral Given 4/6/22 1012)     Medical Decision  Making:   History:   Old Medical Records: I decided to obtain old medical records.  Initial Assessment:   Emergent evaluation of a 56-year-old male who presents to the emergency department with chief complaint of a traumatic right-sided back pain that began yesterday.  Patient is afebrile, hemodynamically stable, nontoxic appearing.  Differential Diagnosis:   Differential diagnosis includes but is not limited to muscular strain, lumbar radiculopathy, cauda equina syndrome, epidural abscess, nephrolithiasis.  ED Management:  There are no signs of saddle anesthesia, incontinence, neurologic deficits, fevers, trauma or midline tenderness on history or physical to suggest cauda equina, infectious process, fracture or subluxation.   Patient's history and physical exam is most consistent with muscular strain of the lumbar region.  He has 5/5 strength in his bilateral lower extremities.  There is no neurological defects.  I do not feel that imaging is warranted at this time.  He does have CKD therefore will refrain from using NSAIDs.  Will treat with Tylenol, Robaxin, and Lidoderm patches as needed for pain.  Recommend close outpatient follow-up.  All questions answered.  The patient was instructed to follow up with a primary care provider or to return to the emergency department for worsening symptoms. The treatment plan was discussed with the patient who demonstrated understanding and comfort with plan. The patient's history, physical exam, and plan of care was discussed with and agreed upon with my supervising physician.               Attending Attestation:     Physician Attestation Statement for NP/PA:   I have conducted a face to face encounter with this patient in addition to the NP/PA, due to Medical Complexity    Other NP/PA Attestation Additions:    History of Present Illness: 55 yo male with right sided lower back pain (lumbar paraspinal region). Started yesterday. He believes that he may have strained something  mowing. It is positional. He has no radiation to the leg. He has no motor or sensory changes to his lower extremities. He has no bowel or bladder incontinence. He tried some tylenol for pain to only minimal avail. He has had no chest or abdominal pain. The pain he is having waxes and wanes in intensity and is moderate in intensity currently. He has no h/o IVDU or malignancy. He is here with his SO.   Physical Exam: VS upon arrival:  165/103; 89; 18; 98% room air; afebrile.  Consititutional: Pt is awake, alert, and oriented x 4. Does not appear to be in any bi distress.  HEENT: PERRL; EOMI; nares patent; op clear; mmm without lesions.  Neck: Supple with good ROM.  CV: Normal rate; regular rhythm; no mrg. Heart sounds normal. No peripheral edema. 2+ radials bilateral and symmetric.  Respiratory: CTA bilaterally with no focal rales, ronchi, or wheezes.  GI: Abdomen soft, NTND. No rebound. No guarding. BS normal.  MSK: No long bone deformities; no focal joint swellings. He has no midline lumbar spinal tenderness. He does have some tenderness of the midline in the right paraspinal lumbar region (lower).  Neuro:  5/5 motor strength to his bilateral lower extremities.  Sensation to fine touch is also grossly intact throughout.  He has 2+ patellar reflexes bilaterally symmetric.  Skin: No skin lesions or rash.    Medical Decision Making: This presentation is consistent with lumbar strain. There are no red flags to his back pain. There is no reason to warrant imaging at today's visit. He has no concern for cauda equina. He has no focal motor or sensory deficit. He has no midline tenderness to the spine. He has no infectious symptoms. He will be discharged with supportive care measures/meds. He will need to follow up within the week for recheck to assure that his pain is improving. I have already given strict return precautions. He is in stable condition at this time.   ED Diagnosis:  1. Acute lumbar strain, right sided.                        Clinical Impression:   Final diagnoses:  [M54.50] Acute right-sided low back pain without sciatica (Primary)          ED Disposition Condition    Discharge Stable        ED Prescriptions     Medication Sig Dispense Start Date End Date Auth. Provider    methocarbamoL (ROBAXIN) 750 MG Tab Take 1 tablet (750 mg total) by mouth 3 (three) times daily as needed (pain). 15 tablet 4/6/2022  Valencia Salcedo PA-C    LIDOcaine (LIDODERM) 5 % Place 1 patch onto the skin once daily. Remove & Discard patch within 12 hours or as directed by MD 10 patch 4/6/2022  Valencia Salcedo PA-C        Follow-up Information     Follow up With Specialties Details Why Contact Info    [facility] - Emergency Dept Emergency Medicine Go to  If symptoms worsen [address] [facility] [id number] [phone]    Ivan Thornton MD Internal Medicine Schedule an appointment as soon as possible for a visit in 1 week  [address]  [place] [address]  [phone]             Valencia Salcedo PA-C  04/06/22 1736       Gena Sam MD  04/12/22 0025

## 2022-04-07 ENCOUNTER — PATIENT OUTREACH (OUTPATIENT)
Dept: ADMINISTRATIVE | Facility: OTHER | Age: 57
End: 2022-04-07
Payer: MEDICARE

## 2022-04-07 NOTE — PROGRESS NOTES
Health Maintenance Due   Topic Date Due    Pneumococcal Vaccines (Age 0-64) (1 of 2 - PPSV23) Never done    Influenza Vaccine (1) Never done     Updates were requested from care everywhere.  Chart was reviewed for overdue Proactive [facility] Encounters ([facility]) topics (CRS, Breast Cancer Screening, Eye exam)  Health Maintenance has been updated.  LINKS immunization registry triggered.  Immunizations were reconciled.

## 2022-04-08 ENCOUNTER — OFFICE VISIT (OUTPATIENT)
Dept: NEPHROLOGY | Facility: CLINIC | Age: 57
End: 2022-04-08
Payer: MEDICARE

## 2022-04-08 VITALS
OXYGEN SATURATION: 95 % | DIASTOLIC BLOOD PRESSURE: 84 MMHG | HEIGHT: 78 IN | HEART RATE: 79 BPM | WEIGHT: 315 LBS | SYSTOLIC BLOOD PRESSURE: 150 MMHG | BODY MASS INDEX: 36.45 KG/M2

## 2022-04-08 DIAGNOSIS — E21.3 HYPERPARATHYROIDISM: ICD-10-CM

## 2022-04-08 DIAGNOSIS — I10 BENIGN ESSENTIAL HTN: ICD-10-CM

## 2022-04-08 DIAGNOSIS — M54.50 LOW BACK PAIN, UNSPECIFIED BACK PAIN LATERALITY, UNSPECIFIED CHRONICITY, UNSPECIFIED WHETHER SCIATICA PRESENT: ICD-10-CM

## 2022-04-08 DIAGNOSIS — N18.32 STAGE 3B CHRONIC KIDNEY DISEASE: Primary | ICD-10-CM

## 2022-04-08 DIAGNOSIS — E66.01 MORBID OBESITY: ICD-10-CM

## 2022-04-08 PROCEDURE — 99214 OFFICE O/P EST MOD 30 MIN: CPT | Mod: PBBFAC | Performed by: NURSE PRACTITIONER

## 2022-04-08 PROCEDURE — 99999 PR PBB SHADOW E&M-EST. PATIENT-LVL IV: CPT | Mod: PBBFAC,,, | Performed by: NURSE PRACTITIONER

## 2022-04-08 PROCEDURE — 99214 PR OFFICE/OUTPT VISIT, EST, LEVL IV, 30-39 MIN: ICD-10-PCS | Mod: S$PBB,,, | Performed by: NURSE PRACTITIONER

## 2022-04-08 PROCEDURE — 99214 OFFICE O/P EST MOD 30 MIN: CPT | Mod: S$PBB,,, | Performed by: NURSE PRACTITIONER

## 2022-04-08 PROCEDURE — 99999 PR PBB SHADOW E&M-EST. PATIENT-LVL IV: ICD-10-PCS | Mod: PBBFAC,,, | Performed by: NURSE PRACTITIONER

## 2022-04-08 RX ORDER — CHLORTHALIDONE 25 MG/1
25 TABLET ORAL DAILY
Qty: 90 TABLET | Refills: 3 | Status: SHIPPED | OUTPATIENT
Start: 2022-04-08 | End: 2023-11-16 | Stop reason: SDUPTHER

## 2022-04-08 NOTE — PROGRESS NOTES
"Subjective:       Patient ID: Fausto Pandey is a 56 y.o. AA male who presents for follow-up evaluation of   No chief complaint on file.      HPI     Last seen in October 2020.     Patient presents for new evaluation of CKD.  Baseline creatinine difficult to determine due to infrequency of available labs; appears to be 2.5-2.7 mg/dL.      Recently with breast tenderness and was instructed to stop spironolactone.    Home BPs: 120s/80s    Significant hx includes HTN.    Social history: smokes ~4 cigarettes daily; no ETOH     The patient denies taking NSAIDs. Previously said he uses marijuana.     Significant family hx includes: HTN; no known renal disease     Last renal doppler US: November 2020, reviewed.    Update 12/10/21:  Last seen April 2021.  Baseline sCr: 2.5-2.7 mg/dL.  Home BPs: 140s/80s after  Denies NSAID use. Denies recent hospitalizations.  Still smoking cigarettes (no longer daily). Still smoking marijuana.  Plans for inguinal hernia surgery this month; has been having significant groin pain that impairs ability to exercise.    Update 4/8/22:  Returns for f/u of CKD.  Baseline sCr: 2.5-2.7 mg/dL, but recent levels have been in the 2.4 range.  Home BPs: 150/80s at home.  Went to ER with back pain. Says muscle relaxers and patch prescribed are not helping. Said tylenol helped in ER but not helping at home. Unsure if it is a different dose.  Has started walking on the treadmill again, trying to lose weight. Unable to right now due to back pain.      Review of Systems   Respiratory: Negative for shortness of breath.    Cardiovascular: Positive for leg swelling.   Gastrointestinal: Negative for diarrhea, nausea and vomiting.   Genitourinary: Negative for difficulty urinating, dysuria, frequency, hematuria and urgency.   Musculoskeletal: Positive for back pain (lower).       Objective:       Blood pressure (!) 150/84, pulse 79, height 6' 6" (1.981 m), weight (!) 163.1 kg (359 lb 9.1 oz), SpO2 95 " %.  Physical Exam  Vitals reviewed.   Constitutional:       General: He is not in acute distress.     Appearance: Normal appearance. He is well-developed. He is obese. He is not ill-appearing or diaphoretic.   Eyes:      Conjunctiva/sclera: Conjunctivae normal.   Cardiovascular:      Rate and Rhythm: Normal rate.   Pulmonary:      Effort: Pulmonary effort is normal. No respiratory distress.   Abdominal:      Tenderness: There is no right CVA tenderness or left CVA tenderness.   Musculoskeletal:      Cervical back: Neck supple.      Right lower leg: No edema.      Left lower leg: No edema.   Skin:     General: Skin is warm and dry.   Neurological:      Mental Status: He is alert and oriented to person, place, and time.   Psychiatric:         Mood and Affect: Mood normal.         Behavior: Behavior normal.         Thought Content: Thought content normal.         Judgment: Judgment normal.           Lab Results   Component Value Date    CREATININE 2.4 (H) 04/01/2022    URICACID 7.7 (H) 08/22/2017     Prot/Creat Ratio, Urine   Date Value Ref Range Status   04/01/2022 0.07 0.00 - 0.20 Final   11/15/2021 0.08 0.00 - 0.20 Final   07/19/2021 0.06 0.00 - 0.20 Final     Lab Results   Component Value Date     04/01/2022    K 3.7 04/01/2022    CO2 26 04/01/2022     04/01/2022     Lab Results   Component Value Date    .6 (H) 04/01/2022    CALCIUM 9.1 04/01/2022    PHOS 3.8 04/01/2022     Lab Results   Component Value Date    HGB 14.7 04/01/2022    WBC 8.15 04/01/2022    HCT 44.9 04/01/2022      Lab Results   Component Value Date    HGBA1C 4.9 02/15/2022     04/01/2022    BUN 22 (H) 04/01/2022     Lab Results   Component Value Date    LDLCALC 73.6 10/18/2021         Assessment:       1. Stage 3b chronic kidney disease    2. Benign essential HTN    3. Hyperparathyroidism    4. Morbid obesity    5. Low back pain, unspecified back pain laterality, unspecified chronicity, unspecified whether sciatica present         Plan:   CKD stage 3B c eGFR 29-34 mL/min - likely 2/2 longstanding poorly controlled HTN.  Stable (recent sCr actually better than previous baseline). Non-proteinuric.  Educated patient to control BP, BG, remain well-hydrated, and avoid NSAIDs to prevent progression of CKD.  Moderate risk of progression to ESRD in the next 5 years.    UPCR Previously proteinuric but it improved. On olmesartan   Acid-base WNL   Renal osteodystrophy Ca, phos okay. PTH elevated. On Vitamin 2000 daily OTC.   Anemia Hgb at goal for CKD.   DM N/a   Lipid Management On statin now. Recommend avoiding fenofibrate.   ESRD planning Anticipatory guidance provided about timing of dialysis. Start discussions and planning when eGFR is about 20 mL/min; most patients start dialysis between 5-10 mL/min.  - Discussed importance of weight loss, smoking cessation, and routine prevention in order to be eligible for transplant if he progresses.      HTN -WNL on amlodipine 10 mg, hydralazine 100 mg TID, olmesartan 40 mg,  Metoprolol 25 mg;   - now off Hctz; says back pain has improved off it; also off spironolactone 2/2 breast tenderness  - Start chlorthaldione 25 mg. BMP in 2 weeks    Lower back pain - flu c PCP     Note: pt is particpaing in [employer] study. Filled out post-survey previously.     All questions patient had were answered.  Asked if further questions. None. F/u in clinic in 3 mos with labs and urine prior to next visit or sooner if needed.  ER for emergency concerns.     Summary of Plan:  1. Start chlorthalidone; BMP in 2-4 weeks  2. avoid NSAID/ fenofibrate/ bactrim/ IV contrast/ gadolinium/ aminoglycoside where possible  3. RTC in 3 mos c labs

## 2022-04-18 ENCOUNTER — PATIENT MESSAGE (OUTPATIENT)
Dept: INTERNAL MEDICINE | Facility: CLINIC | Age: 57
End: 2022-04-18
Payer: MEDICARE

## 2022-04-20 DIAGNOSIS — I10 BENIGN ESSENTIAL HTN: ICD-10-CM

## 2022-04-20 RX ORDER — SPIRONOLACTONE 25 MG/1
TABLET ORAL
Qty: 90 TABLET | Refills: 3 | OUTPATIENT
Start: 2022-04-20

## 2022-04-20 NOTE — TELEPHONE ENCOUNTER
No new care gaps identified.  Powered by [employer]. Reference number: [id number].   4/20/2022 12:59:58 PM CDT

## 2022-04-21 NOTE — TELEPHONE ENCOUNTER
Quick DC. Inappropriate Request    Refill Authorization Note   Fausto Pandey  is requesting a refill authorization.  Brief Assessment and Rationale for Refill:  Quick Discontinue  Medication Therapy Plan:  Spironolactone 25mg was discontinued on 4/27/21 by Roxanna Ibarra NP    Medication Reconciliation Completed:  No      Comments:     Note composed:7:35 PM 04/20/2022

## 2022-04-22 ENCOUNTER — LAB VISIT (OUTPATIENT)
Dept: LAB | Facility: OTHER | Age: 57
End: 2022-04-22
Attending: NURSE PRACTITIONER
Payer: MEDICARE

## 2022-04-22 ENCOUNTER — TELEPHONE (OUTPATIENT)
Dept: NEPHROLOGY | Facility: CLINIC | Age: 57
End: 2022-04-22
Payer: MEDICARE

## 2022-04-22 DIAGNOSIS — N18.32 STAGE 3B CHRONIC KIDNEY DISEASE: ICD-10-CM

## 2022-04-22 LAB
ANION GAP SERPL CALC-SCNC: 13 MMOL/L (ref 8–16)
BUN SERPL-MCNC: 27 MG/DL (ref 6–20)
CALCIUM SERPL-MCNC: 9.2 MG/DL (ref 8.7–10.5)
CHLORIDE SERPL-SCNC: 103 MMOL/L (ref 95–110)
CO2 SERPL-SCNC: 27 MMOL/L (ref 23–29)
CREAT SERPL-MCNC: 2.7 MG/DL (ref 0.5–1.4)
EST. GFR  (AFRICAN AMERICAN): 30 ML/MIN/1.73 M^2
EST. GFR  (NON AFRICAN AMERICAN): 26 ML/MIN/1.73 M^2
GLUCOSE SERPL-MCNC: 105 MG/DL (ref 70–110)
POTASSIUM SERPL-SCNC: 4 MMOL/L (ref 3.5–5.1)
SODIUM SERPL-SCNC: 143 MMOL/L (ref 136–145)

## 2022-04-22 PROCEDURE — 36415 COLL VENOUS BLD VENIPUNCTURE: CPT | Performed by: NURSE PRACTITIONER

## 2022-04-22 PROCEDURE — 80048 BASIC METABOLIC PNL TOTAL CA: CPT | Performed by: NURSE PRACTITIONER

## 2022-04-22 NOTE — TELEPHONE ENCOUNTER
----- Message from Collin Gallardo MD sent at 4/22/2022  3:16 PM CDT -----  Slightly worsened kidney function from recent baseline. Electrolytes stable. Please check with patient if any new symptoms, if able to have adequate oral intake, if urinating ok. Thanks.

## 2022-04-22 NOTE — PROGRESS NOTES
Slightly worsened kidney function from recent baseline. Electrolytes stable. Please check with patient if any new symptoms, if able to have adequate oral intake, if urinating ok. Thanks.

## 2022-04-28 ENCOUNTER — OFFICE VISIT (OUTPATIENT)
Dept: INTERNAL MEDICINE | Facility: CLINIC | Age: 57
End: 2022-04-28
Payer: MEDICARE

## 2022-04-28 VITALS
HEIGHT: 74 IN | OXYGEN SATURATION: 98 % | DIASTOLIC BLOOD PRESSURE: 70 MMHG | WEIGHT: 315 LBS | SYSTOLIC BLOOD PRESSURE: 156 MMHG | HEART RATE: 87 BPM | BODY MASS INDEX: 40.43 KG/M2 | RESPIRATION RATE: 18 BRPM

## 2022-04-28 DIAGNOSIS — M54.50 ACUTE RIGHT-SIDED LOW BACK PAIN WITHOUT SCIATICA: ICD-10-CM

## 2022-04-28 DIAGNOSIS — Z09 FOLLOW UP: Primary | ICD-10-CM

## 2022-04-28 PROCEDURE — 99999 PR PBB SHADOW E&M-EST. PATIENT-LVL IV: ICD-10-PCS | Mod: PBBFAC,,, | Performed by: INTERNAL MEDICINE

## 2022-04-28 PROCEDURE — 99213 PR OFFICE/OUTPT VISIT, EST, LEVL III, 20-29 MIN: ICD-10-PCS | Mod: S$PBB,,, | Performed by: INTERNAL MEDICINE

## 2022-04-28 PROCEDURE — 99213 OFFICE O/P EST LOW 20 MIN: CPT | Mod: S$PBB,,, | Performed by: INTERNAL MEDICINE

## 2022-04-28 PROCEDURE — 99214 OFFICE O/P EST MOD 30 MIN: CPT | Mod: PBBFAC | Performed by: INTERNAL MEDICINE

## 2022-04-28 PROCEDURE — 99999 PR PBB SHADOW E&M-EST. PATIENT-LVL IV: CPT | Mod: PBBFAC,,, | Performed by: INTERNAL MEDICINE

## 2022-04-28 RX ORDER — METHOCARBAMOL 750 MG/1
750 TABLET, FILM COATED ORAL 3 TIMES DAILY PRN
Qty: 21 TABLET | Refills: 0 | Status: SHIPPED | OUTPATIENT
Start: 2022-04-28

## 2022-04-28 NOTE — PROGRESS NOTES
Subjective:       Patient ID: Fausto Pandey is a 56 y.o. male.    Chief Complaint: Follow-up      HPI  Fausto Pandey is a 56 y.o. year old male with HTN, CKD IIIb presents for ER follow up after visit for lower back strain. Was doing more yardwork than usual, woke up next morning with tightness, spasms and pain. Treated with robaxin with decent improvement. Denies any bladder / bowel changes. Denies any numbness / tingling down lower extremities.     Has cut down smoking cigarettes due to not feeling well recently; continues to smoke THC daily    Review of Systems   Constitutional: Negative for chills and fever.   HENT: Positive for sinus pressure.    Musculoskeletal: Positive for back pain and myalgias.         Past Medical History:   Diagnosis Date    Benign essential HTN 8/22/2017    Chronic kidney disease, stage III (moderate) 8/22/2017    Internal derangement of left knee 10/9/2017        Prior to Admission medications    Medication Sig Start Date End Date Taking? Authorizing Provider   acetaminophen (TYLENOL) 500 MG tablet Take 1 tablet (500 mg total) by mouth every 6 (six) hours as needed for Pain. 6/29/19  Yes Shayna Mitchell MD   amLODIPine (NORVASC) 10 MG tablet TAKE 1 TABLET(10 MG) BY MOUTH EVERY DAY 1/13/22  Yes Ivan Thornton MD   atorvastatin (LIPITOR) 80 MG tablet TAKE 1 TABLET(80 MG) BY MOUTH EVERY DAY 11/1/21  Yes Brisa Lynn MD   cetirizine (ZYRTEC) 10 MG tablet Take 1 tablet (10 mg total) by mouth 2 (two) times daily. 12/13/18  Yes Enio Sanchez MD   chlorthalidone (HYGROTEN) 25 MG Tab Take 1 tablet (25 mg total) by mouth once daily. 4/8/22 4/8/23 Yes Roxanna Ibarra NP   cholecalciferol, vitamin D3, (VITAMIN D3) 50 mcg (2,000 unit) Cap capsule Take 1 capsule by mouth once daily.   Yes Historical Provider   hydrALAZINE (APRESOLINE) 100 MG tablet Take 1 tablet (100 mg total) by mouth every 8 (eight) hours. 2/15/22 2/15/23 Yes Ivan Thornton MD   LIDOcaine (LIDODERM) 5 %  "Place 1 patch onto the skin once daily. Remove & Discard patch within 12 hours or as directed by MD 4/6/22  Yes Valencia Salcedo PA-C   methocarbamoL (ROBAXIN) 750 MG Tab Take 1 tablet (750 mg total) by mouth 3 (three) times daily as needed (pain). 4/6/22  Yes Valencia Salcedo PA-C   metoprolol succinate (TOPROL-XL) 25 MG 24 hr tablet Take 1 tablet (25 mg total) by mouth once daily. 10/14/21 10/14/22 Yes Ivan Thornton MD   olmesartan (BENICAR) 40 MG tablet Take 1 tablet (40 mg total) by mouth once daily. 10/14/21 10/14/22 Yes Ivan Thornton MD        Past medical history, surgical history, and family medical history reviewed and updated as appropriate.    Medications and allergies reviewed.     Objective:          Vitals:    04/28/22 0801   BP: (!) 156/70   BP Location: Right arm   Patient Position: Sitting   BP Method: Large (Manual)   Pulse: 87   Resp: 18   SpO2: 98%   Weight: (!) 161.8 kg (356 lb 11.3 oz)   Height: 6' 2" (1.88 m)     Body mass index is 45.8 kg/m².  Physical Exam  HENT:      Nose: Congestion present.   Pulmonary:      Effort: Pulmonary effort is normal.      Breath sounds: Rhonchi present.   Musculoskeletal:         General: Tenderness (tenderness appreciated along paraspinous muscles of lumbar spine ) present.   Neurological:      General: No focal deficit present.      Mental Status: He is oriented to person, place, and time.         Lab Results   Component Value Date    WBC 8.15 04/01/2022    HGB 14.7 04/01/2022    HCT 44.9 04/01/2022     04/01/2022    CHOL 130 10/18/2021    TRIG 142 10/18/2021    HDL 28 (L) 10/18/2021    ALT 22 10/18/2021    AST 27 10/18/2021     04/22/2022    K 4.0 04/22/2022     04/22/2022    CREATININE 2.7 (H) 04/22/2022    BUN 27 (H) 04/22/2022    CO2 27 04/22/2022    TSH 1.709 10/20/2020    HGBA1C 4.9 02/15/2022       Assessment:       1. Follow up    2. Acute right-sided low back pain without sciatica          Plan:     Fausto was seen today for " follow-up.    Diagnoses and all orders for this visit:    Follow up  Comments:  ER visit for acute lower back pain; prescribed lidocaine patch and robaxin with some improvement of symptoms. Taking tylenol with robaxin with good relief    Acute right-sided low back pain without sciatica  Comments:  will refer to physical therapy; doing well recovering. no neurologic deficits noted; no weakness. stretching exercises. Represcribe robaxin for short course  Orders:  -     Ambulatory referral/consult to Physical/Occupational Therapy; Future    Other orders  -     methocarbamoL (ROBAXIN) 750 MG Tab; Take 1 tablet (750 mg total) by mouth 3 (three) times daily as needed (pain).        Follow up if symptoms worsen or fail to improve.    Ivan Thornton MD  Internal Medicine / Primary Care  [facility]  4/28/2022

## 2022-04-28 NOTE — PATIENT INSTRUCTIONS
Due for pneumonia shot (PPSV-20) - can get this done at any time at [facility] or any local pharmacy.     Refill of methocarbamol sent to pharmacy    Physical therapy referral placed in case you want to start utilizing them for your low back pain if it does not improve.     Return to clinic as scheduled.

## 2022-05-06 ENCOUNTER — CLINICAL SUPPORT (OUTPATIENT)
Dept: REHABILITATION | Facility: HOSPITAL | Age: 57
End: 2022-05-06
Attending: INTERNAL MEDICINE
Payer: MEDICARE

## 2022-05-06 DIAGNOSIS — M54.50 ACUTE RIGHT-SIDED LOW BACK PAIN WITHOUT SCIATICA: ICD-10-CM

## 2022-05-06 PROCEDURE — 97110 THERAPEUTIC EXERCISES: CPT

## 2022-05-06 PROCEDURE — 97161 PT EVAL LOW COMPLEX 20 MIN: CPT

## 2022-05-06 NOTE — PLAN OF CARE
[facility]  Physical Therapy Initial Evaluation  [place] 1st Floor    Name: Fausto Pandey  Clinic Number: [id number]    Therapy Diagnosis:   Encounter Diagnosis   Name Primary?    Acute right-sided low back pain without sciatica      Physician: Ivan Thornton MD    Physician Orders: PT Eval and Treat   Medical Diagnosis from Referral: Acute right-sided low back pain without sciatica   Evaluation Date: 5/6/2022  Authorization Period Expiration: 04/28/2022 - 04/28/2023   Plan of Care Expiration: 8/6/22  Visit # / Visits authorized: 1/ 1    FOTO: 44%  FOTO 1st follow up:   FOTO 2nd follow up:     Time In: 300 pm   Time Out: 400 pm   Total Billable Time: 60  minutes    Precautions: Standard, HTN;     Subjective   Date of onset: 2 weeks ago   History of current condition - Fausto reports: he went to the ER for his lower back pain. The ER docs said that his muscles were inflamed. He reports they did not do an Xray, but they gave him lidocaine patch and 2 tylenols. Pt reports a few days before he was cleaning his yard up and picking up things from the ground which may have attributed to his pain. He does admit that cortez has a history of this type of back pain prior. No numbness tingling, no leg pain. Pt states it is worse in the mornings. Hot shower calms it down.        Past Medical History:   Diagnosis Date    Benign essential HTN 8/22/2017    Chronic kidney disease, stage III (moderate) 8/22/2017    Internal derangement of left knee 10/9/2017     Fausto Pandey  has no past surgical history on file.    Fausto has a current medication list which includes the following prescription(s): acetaminophen, amlodipine, atorvastatin, cetirizine, chlorthalidone, cholecalciferol (vitamin d3), hydralazine, lidocaine, methocarbamol, metoprolol succinate, and olmesartan.    Review of patient's allergies indicates:   Allergen Reactions    Spironolactone Other (See Comments)     Breast tenderness  "   Ibuprofen Other (See Comments)     Unable to take Ibuprofen due to decreased kidney function        Imaging:   ER did not take Xray     Prior Therapy: years ago for same issue   Social History:    Occupation: not working   Prior Level of Function: pain-free   Current Level of Function: low back pain     Pain:  Current 8/10, worst 10/10, best 0/10   Location: center of mid back   Description: aching, constant   Aggravating Factors: first thing in the morning, transitions   Easing Factors: warm/hot shower     Pts goals: pain-free     Objective     Observation: posterior pelvic tilted; decreased lumbar lordosis     Lumbar Range of Motion:  + Extension  + L Sidebending  + R/L Rotation    Lower Extremity Strength   Right LE Left LE   Quadriceps: 4/5 4/5   Hamstrings: 4/5 4/5   Iliospoas: 4/5 4/5   Hip extension:  4/5 4/5   PGM: 3/5 3/5   Hip ER:  3/5 3/5   Hip IR: 4/5 4/5   Ankle dorsiflexion: 4/5 4/5   Ankle plantarflexion: 3+/5 3+/5     Special Tests:  -SLR Test: +L  -Repeated Flexion: +  -Repeated Ext: - p!  -Slump Test: +L    Joint Mobility:   -Segmental mobility testing:pain w/ CPA T8-L5     Palpation:   -Erector Spinae: bilateral tenderness lumbar and thoracic   -Multifidi activation: poor     Flexibility:   -Ely's test: R = + ; L = +  -Hamstring : R = + ; L = +      CMS Impairment/Limitation/Restriction for FOTO LUMBAR Survey    Therapist reviewed FOTO scores for Fausto Pandey on 5/6/2022.   FOTO documents entered into [employer] - see Media section.    Limitation Score: 44%       TREATMENT   Treatment Time In: 350 pm   Treatment Time Out: 400 pm   Total Treatment time separate from Evaluation: 10  minutes  Seated flexion stretch 20x  LTR 20x  SL to DL KTC Stretch 5x5" hold  Seated hs stretch  x10  Supine posterior pelvic tilts 10x3-5"hold  Open Books x10  PPT 5"x5    Home Exercises and Patient Education Provided    Education provided about:   - PT POC   - PT goals   - exercises/HEP      Written Home " Exercises Provided:   Exercises were reviewed and Fausto was able to demonstrate them prior to the end of the session.   Pt received a written copy of exercises to perform at home. Fausto demonstrated good understanding of the education provided.     See EMR under patient instructions for exercises given.   Assessment   Fausto is a 56 y.o. male referred to outpatient Physical Therapy with a medical diagnosis of Acute right-sided low back pain without sciatica . Pt presents with bilateral centralized back pain, pain with extension>L SB> R=L rot, pain with CPA to T/L spine, + SLR/Slump on Left sides.     Pt prognosis is Fair.   Pt will benefit from skilled outpatient Physical Therapy to address the deficits stated above and in the chart below, provide pt/family education, and to maximize pt's level of independence.     Plan of care discussed with patient: Yes  Pt's spiritual, cultural and educational needs considered and patient is agreeable to the plan of care and goals as stated below:     Anticipated Barriers for therapy: none    Medical Necessity is demonstrated by the following  History  Co-morbidities and personal factors that may impact the plan of care Co-morbidities:   high BMI and HTN    Personal Factors:   no deficits       Low   Examination  Body Structures and Functions, activity limitations and participation restrictions that may impact the plan of care Body Regions:   back    Body Systems:    gross symmetry  ROM  strength  gross coordinated movement  balance  gait  transfers  transitions  motor control  motor learning    Participation Restrictions:   None identified    Activity limitations:   no deficits    General Tasks and Commands  no deficits    Communication  no deficits    Mobility  no deficits    Self care  no deficits    Domestic Life  no deficits    Interactions/Relationships  no deficits    Life Areas  no deficits    Community and Social Life  no deficits         Low   Clinical Presentation  stable and uncomplicated Low   Decision Making/ Complexity Score: Low     Goals:  Short Term Goals: 4 weeks   1. Independent with HEP  2. Increase pain-free lumbar ROM by 5 degrees   3. Demonstrate back safe bending and lifting form Independently   4. Report decreased Lumbar pain < 9/10  to increase tolerance for ADLs     Long Term Goals: 7 weeks   1. Increase pain-free B lumbar sidebending ROM to 35 degrees   2. Increase pain-free lumbar flexion ROM to 35 degrees  3. Increase pain-free lumbar extension ROM to 20 degrees  4. Increase strength of BLE and erector spinae to 4+/5 pain-free  5. Able to bend and lift up 10lb using back safe technique without pain  6. Report decreased Lumbar pain < 2/10  to increase tolerance for ADLs      Plan   Plan of care Certification: 5/6/2022 to 8/6/22    Outpatient Physical Therapy 2 times weekly for 7 weeks to include the following interventions: Gait Training, Manual Therapy, Moist Heat/ Ice, Neuromuscular Re-ed, Patient Education, Therapeutic Activites, Therapeutic Exercise, and Functional Dry Needling with/or without Electrical Stimulation as needed.     Caimla Nelson, PT, DPT

## 2022-05-18 ENCOUNTER — PATIENT MESSAGE (OUTPATIENT)
Dept: SMOKING CESSATION | Facility: CLINIC | Age: 57
End: 2022-05-18
Payer: MEDICARE

## 2022-05-18 ENCOUNTER — CLINICAL SUPPORT (OUTPATIENT)
Dept: REHABILITATION | Facility: HOSPITAL | Age: 57
End: 2022-05-18
Attending: INTERNAL MEDICINE
Payer: MEDICARE

## 2022-05-18 NOTE — PROGRESS NOTES
"  Physical Therapy Daily Treatment Note     Name: Fausto Pandey  [facility] Number: [id number]    Therapy Diagnosis:        Encounter Diagnosis   Name Primary?    Acute right-sided low back pain without sciatica        Physician: Ivan Thornton MD     Physician Orders: PT Eval and Treat   Medical Diagnosis from Referral: Acute right-sided low back pain without sciatica   Evaluation Date: 5/6/2022  Authorization Period Expiration: 04/28/2022 - 04/28/2023   Plan of Care Expiration: 8/6/22  Visit # / Visits authorized: 2/20     FOTO: 44%  FOTO 1st follow up:   FOTO 2nd follow up:      Time In: 1350   Time Out: 1450  Total Billable Time: 60  minutes     Precautions: Standard, HTN    Subjective     Pt reports: mod/severe pain at present time   He was compliant with home exercise program.  Response to previous treatment: no issues   Functional change: chronic pain     Pain: 8/10, 5/10 after tx.   Location: bilateral back      Objective     Fausto received therapeutic exercises to develop strength, endurance, ROM, flexibility and posture for 50 minutes including:  Moist heat LB for 10' perform SKTC and DKTC SB   LTR 30x   B piriformis stretch 3x/30" ea   B Open book 30x ea    Red ball roll out 3 ways 30x ea   Recumbent bike for 10' for increased ROM, circulation, and mm strength/endurance     Neuromuscular education for 10' including   PPT TrA activation 3x10/3"  TrA activation Marching 30x     Fausto received cold pack for 0 minutes to to decrease circulation, pain, and swelling      Home Exercises Provided and Patient Education Provided     Education provided: Compliance with HEP     Written Home Exercises Provided: yes.  Exercises were reviewed and Fausto was able to demonstrate them prior to the end of the session.  Fausto demonstrated good  understanding of the education provided.     Assessment   Pt tolerating tx well with decreased pain noted after tx. VC/TC for correcting form/technique with therex. Continue to " progress as tolerated  Fausto Is progressing well towards his goals.   Pt prognosis is Good.     Pt will continue to benefit from skilled outpatient physical therapy to address the deficits listed in the problem list box on initial evaluation, provide pt/family education and to maximize pt's level of independence in the home and community environment.     Pt's spiritual, cultural and educational needs considered and pt agreeable to plan of care and goals.    Anticipated barriers to physical therapy: none     Goals:   Goals:  Short Term Goals: 4 weeks   1. Independent with HEP  2. Increase pain-free lumbar ROM by 5 degrees   3. Demonstrate back safe bending and lifting form Independently   4. Report decreased Lumbar pain < 9/10  to increase tolerance for ADLs      Long Term Goals: 7 weeks   1. Increase pain-free B lumbar sidebending ROM to 35 degrees   2. Increase pain-free lumbar flexion ROM to 35 degrees  3. Increase pain-free lumbar extension ROM to 20 degrees  4. Increase strength of BLE and erector spinae to 4+/5 pain-free  5. Able to bend and lift up 10lb using back safe technique without pain  6. Report decreased Lumbar pain < 2/10  to increase tolerance for ADLs         Plan     Continue with POC     Catalino Cortes, PTA, STS

## 2022-05-20 ENCOUNTER — CLINICAL SUPPORT (OUTPATIENT)
Dept: REHABILITATION | Facility: HOSPITAL | Age: 57
End: 2022-05-20
Attending: INTERNAL MEDICINE
Payer: MEDICARE

## 2022-05-20 DIAGNOSIS — M54.50 CHRONIC MIDLINE LOW BACK PAIN WITHOUT SCIATICA: ICD-10-CM

## 2022-05-20 DIAGNOSIS — G89.29 CHRONIC MIDLINE LOW BACK PAIN WITHOUT SCIATICA: ICD-10-CM

## 2022-05-20 PROCEDURE — 97110 THERAPEUTIC EXERCISES: CPT

## 2022-05-20 NOTE — PROGRESS NOTES
"  Physical Therapy Daily Treatment Note     Name: Fausto Pandey  [facility] Number: [id number]    Therapy Diagnosis:        Encounter Diagnosis   Name Primary?    Acute right-sided low back pain without sciatica        Physician: Ivan Thornton MD     Physician Orders: PT Eval and Treat   Medical Diagnosis from Referral: Acute right-sided low back pain without sciatica   Evaluation Date: 5/6/2022  Authorization Period Expiration: 04/28/2022 - 04/28/2023   Plan of Care Expiration: 8/6/22  Visit # / Visits authorized: 2/20     FOTO: 44%  FOTO 1st follow up:   FOTO 2nd follow up:      Time In: 200 pm    Time Out: 250 pm   Total Billable Time: 50  minutes     Precautions: Standard, HTN    Subjective     Pt reports: today's a bad pain day but currently not in any pain due to lidocaine patch   He was compliant with home exercise program.  Response to previous treatment: no issues   Functional change: chronic pain     Pain: 0/10  Location: bilateral back      Objective     Fausto received therapeutic exercises to develop strength, endurance, ROM, flexibility and posture for 50 minutes including:  Red ball roll out 3 ways 30x ea   LTR 30x   B Open book 30x ea      PPT TrA activation 3x10/3"  Shuttle w/ PPT 3x15   Mod bridge with wedge under knees 3x10      Home Exercises Provided and Patient Education Provided     Education provided: Compliance with HEP     Written Home Exercises Provided: yes.  Exercises were reviewed and Fausto was able to demonstrate them prior to the end of the session.  Fausto demonstrated good  understanding of the education provided.     Assessment   Pt had relief with shuttle today, did well with posterior pelvic tilts.     Fausto Is progressing well towards his goals.   Pt prognosis is Good.     Pt will continue to benefit from skilled outpatient physical therapy to address the deficits listed in the problem list box on initial evaluation, provide pt/family education and to maximize pt's level of " independence in the home and community environment.     Pt's spiritual, cultural and educational needs considered and pt agreeable to plan of care and goals.    Anticipated barriers to physical therapy: none     Goals:   Goals:  Short Term Goals: 4 weeks   1. Independent with HEP  2. Increase pain-free lumbar ROM by 5 degrees   3. Demonstrate back safe bending and lifting form Independently   4. Report decreased Lumbar pain < 9/10  to increase tolerance for ADLs      Long Term Goals: 7 weeks   1. Increase pain-free B lumbar sidebending ROM to 35 degrees   2. Increase pain-free lumbar flexion ROM to 35 degrees  3. Increase pain-free lumbar extension ROM to 20 degrees  4. Increase strength of BLE and erector spinae to 4+/5 pain-free  5. Able to bend and lift up 10lb using back safe technique without pain  6. Report decreased Lumbar pain < 2/10  to increase tolerance for ADLs         Plan     Continue with POC     Camila Nelson, PT, DPT

## 2022-06-06 ENCOUNTER — PES CALL (OUTPATIENT)
Dept: ADMINISTRATIVE | Facility: CLINIC | Age: 57
End: 2022-06-06
Payer: MEDICARE

## 2022-07-08 ENCOUNTER — LAB VISIT (OUTPATIENT)
Dept: LAB | Facility: OTHER | Age: 57
End: 2022-07-08
Attending: NURSE PRACTITIONER
Payer: MEDICARE

## 2022-07-08 DIAGNOSIS — N18.32 STAGE 3B CHRONIC KIDNEY DISEASE: ICD-10-CM

## 2022-07-08 LAB
ALBUMIN SERPL BCP-MCNC: 3.5 G/DL (ref 3.5–5.2)
ANION GAP SERPL CALC-SCNC: 12 MMOL/L (ref 8–16)
BASOPHILS # BLD AUTO: 0.03 K/UL (ref 0–0.2)
BASOPHILS NFR BLD: 0.4 % (ref 0–1.9)
BUN SERPL-MCNC: 21 MG/DL (ref 6–20)
CALCIUM SERPL-MCNC: 9 MG/DL (ref 8.7–10.5)
CHLORIDE SERPL-SCNC: 104 MMOL/L (ref 95–110)
CO2 SERPL-SCNC: 26 MMOL/L (ref 23–29)
CREAT SERPL-MCNC: 2.8 MG/DL (ref 0.5–1.4)
DIFFERENTIAL METHOD: ABNORMAL
EOSINOPHIL # BLD AUTO: 0.6 K/UL (ref 0–0.5)
EOSINOPHIL NFR BLD: 7.9 % (ref 0–8)
ERYTHROCYTE [DISTWIDTH] IN BLOOD BY AUTOMATED COUNT: 13.5 % (ref 11.5–14.5)
EST. GFR  (AFRICAN AMERICAN): 28 ML/MIN/1.73 M^2
EST. GFR  (NON AFRICAN AMERICAN): 24 ML/MIN/1.73 M^2
GLUCOSE SERPL-MCNC: 136 MG/DL (ref 70–110)
HCT VFR BLD AUTO: 41 % (ref 40–54)
HGB BLD-MCNC: 13.5 G/DL (ref 14–18)
IMM GRANULOCYTES # BLD AUTO: 0.01 K/UL (ref 0–0.04)
IMM GRANULOCYTES NFR BLD AUTO: 0.1 % (ref 0–0.5)
LYMPHOCYTES # BLD AUTO: 3.9 K/UL (ref 1–4.8)
LYMPHOCYTES NFR BLD: 49.9 % (ref 18–48)
MCH RBC QN AUTO: 34.5 PG (ref 27–31)
MCHC RBC AUTO-ENTMCNC: 32.9 G/DL (ref 32–36)
MCV RBC AUTO: 105 FL (ref 82–98)
MONOCYTES # BLD AUTO: 0.6 K/UL (ref 0.3–1)
MONOCYTES NFR BLD: 7.4 % (ref 4–15)
NEUTROPHILS # BLD AUTO: 2.7 K/UL (ref 1.8–7.7)
NEUTROPHILS NFR BLD: 34.3 % (ref 38–73)
NRBC BLD-RTO: 0 /100 WBC
PHOSPHATE SERPL-MCNC: 4.2 MG/DL (ref 2.7–4.5)
PLATELET # BLD AUTO: 215 K/UL (ref 150–450)
PMV BLD AUTO: 10.6 FL (ref 9.2–12.9)
POTASSIUM SERPL-SCNC: 4.1 MMOL/L (ref 3.5–5.1)
PTH-INTACT SERPL-MCNC: 143.3 PG/ML (ref 9–77)
RBC # BLD AUTO: 3.91 M/UL (ref 4.6–6.2)
SODIUM SERPL-SCNC: 142 MMOL/L (ref 136–145)
WBC # BLD AUTO: 7.82 K/UL (ref 3.9–12.7)

## 2022-07-08 PROCEDURE — 83970 ASSAY OF PARATHORMONE: CPT | Performed by: NURSE PRACTITIONER

## 2022-07-08 PROCEDURE — 80069 RENAL FUNCTION PANEL: CPT | Performed by: NURSE PRACTITIONER

## 2022-07-08 PROCEDURE — 36415 COLL VENOUS BLD VENIPUNCTURE: CPT | Performed by: NURSE PRACTITIONER

## 2022-07-08 PROCEDURE — 85025 COMPLETE CBC W/AUTO DIFF WBC: CPT | Performed by: NURSE PRACTITIONER

## 2022-07-12 ENCOUNTER — TELEPHONE (OUTPATIENT)
Dept: NEPHROLOGY | Facility: CLINIC | Age: 57
End: 2022-07-12
Payer: MEDICARE

## 2022-07-13 ENCOUNTER — TELEPHONE (OUTPATIENT)
Dept: NEPHROLOGY | Facility: CLINIC | Age: 57
End: 2022-07-13
Payer: MEDICARE

## 2022-07-25 ENCOUNTER — OFFICE VISIT (OUTPATIENT)
Dept: NEPHROLOGY | Facility: CLINIC | Age: 57
End: 2022-07-25
Payer: MEDICARE

## 2022-07-25 VITALS
HEIGHT: 72 IN | BODY MASS INDEX: 42.66 KG/M2 | SYSTOLIC BLOOD PRESSURE: 136 MMHG | OXYGEN SATURATION: 97 % | DIASTOLIC BLOOD PRESSURE: 82 MMHG | HEART RATE: 86 BPM | WEIGHT: 315 LBS

## 2022-07-25 DIAGNOSIS — F17.200 SMOKER: ICD-10-CM

## 2022-07-25 DIAGNOSIS — N18.32 STAGE 3B CHRONIC KIDNEY DISEASE: Primary | ICD-10-CM

## 2022-07-25 DIAGNOSIS — I10 HYPERTENSION, UNSPECIFIED TYPE: ICD-10-CM

## 2022-07-25 DIAGNOSIS — E66.01 MORBID OBESITY: ICD-10-CM

## 2022-07-25 DIAGNOSIS — D64.9 ANEMIA, UNSPECIFIED TYPE: ICD-10-CM

## 2022-07-25 DIAGNOSIS — N25.81 SECONDARY HYPERPARATHYROIDISM: ICD-10-CM

## 2022-07-25 PROCEDURE — 99214 OFFICE O/P EST MOD 30 MIN: CPT | Mod: PBBFAC | Performed by: NURSE PRACTITIONER

## 2022-07-25 PROCEDURE — 99999 PR PBB SHADOW E&M-EST. PATIENT-LVL IV: CPT | Mod: PBBFAC,,, | Performed by: NURSE PRACTITIONER

## 2022-07-25 PROCEDURE — 99214 PR OFFICE/OUTPT VISIT, EST, LEVL IV, 30-39 MIN: ICD-10-PCS | Mod: S$PBB,,, | Performed by: NURSE PRACTITIONER

## 2022-07-25 PROCEDURE — 99214 OFFICE O/P EST MOD 30 MIN: CPT | Mod: S$PBB,,, | Performed by: NURSE PRACTITIONER

## 2022-07-25 PROCEDURE — 99999 PR PBB SHADOW E&M-EST. PATIENT-LVL IV: ICD-10-PCS | Mod: PBBFAC,,, | Performed by: NURSE PRACTITIONER

## 2022-07-25 NOTE — PROGRESS NOTES
Subjective:       Patient ID: Fausto Pandey is a 56 y.o. AA male who presents for follow-up evaluation of   No chief complaint on file.      HPI     Last seen in October 2020.     Patient presents for new evaluation of CKD.  Baseline creatinine difficult to determine due to infrequency of available labs; appears to be 2.5-2.7 mg/dL.      Recently with breast tenderness and was instructed to stop spironolactone.    Home BPs: 120s/80s    Significant hx includes HTN.    Social history: smokes ~4 cigarettes daily; no ETOH     The patient denies taking NSAIDs. Previously said he uses marijuana.     Significant family hx includes: HTN; no known renal disease     Last renal doppler US: November 2020, reviewed.    Update 12/10/21:  Last seen April 2021.  Baseline sCr: 2.5-2.7 mg/dL.  Home BPs: 140s/80s after  Denies NSAID use. Denies recent hospitalizations.  Still smoking cigarettes (no longer daily). Still smoking marijuana.  Plans for inguinal hernia surgery this month; has been having significant groin pain that impairs ability to exercise.    Update 4/8/22:  Returns for f/u of CKD.  Baseline sCr: 2.5-2.7 mg/dL, but recent levels have been in the 2.4 range.  Home BPs: 150/80s at home.  Went to ER with back pain. Says muscle relaxers and patch prescribed are not helping. Said tylenol helped in ER but not helping at home. Unsure if it is a different dose.  Has started walking on the treadmill again, trying to lose weight. Unable to right now due to back pain.    Update 7/25/22:  Returns for f/u of CKD.  Baseline sCr: 2.5-2.7 mg/dL.  Started chlorthalidone last visit.    Home BPs: 130s/80s   Still smoking, but has decreased.  Started exercising again recently.    Denies NSAIDs. Denies hospitalizations.    Review of Systems   Respiratory: Negative for shortness of breath.    Cardiovascular: Negative for leg swelling.  Gastrointestinal: Negative for diarrhea, nausea and vomiting.   Genitourinary: Negative for  difficulty urinating, dysuria, frequency, hematuria and urgency.   Musculoskeletal: Negative for back pain.      Objective:       Blood pressure 136/82, pulse 86, height 6' (1.829 m), weight (!) 160.7 kg (354 lb 4.5 oz), SpO2 97 %.  Physical Exam  Vitals reviewed.   Constitutional:       General: He is not in acute distress.     Appearance: Normal appearance. He is well-developed. He is obese. He is not ill-appearing or diaphoretic.   Cardiovascular:      Rate and Rhythm: Normal rate.   Pulmonary:      Effort: Pulmonary effort is normal. No respiratory distress.   Abdominal:      Tenderness: There is no right CVA tenderness or left CVA tenderness.   Musculoskeletal:      Cervical back: Neck supple.      Right lower leg: trace edema.      Left lower leg: trace edema.   Skin:     General: Skin is warm and dry.   Neurological:      Mental Status: He is alert and oriented to person, place, and time.   Psychiatric:         Mood and Affect: Mood normal.         Behavior: Behavior normal.         Thought Content: Thought content normal.         Judgment: Judgment normal.           Lab Results   Component Value Date    CREATININE 2.8 (H) 07/08/2022    URICACID 7.7 (H) 08/22/2017     Prot/Creat Ratio, Urine   Date Value Ref Range Status   07/08/2022 0.11 0.00 - 0.20 Final   04/01/2022 0.07 0.00 - 0.20 Final   11/15/2021 0.08 0.00 - 0.20 Final     Lab Results   Component Value Date     07/08/2022    K 4.1 07/08/2022    CO2 26 07/08/2022     07/08/2022     Lab Results   Component Value Date    .3 (H) 07/08/2022    CALCIUM 9.0 07/08/2022    PHOS 4.2 07/08/2022     Lab Results   Component Value Date    HGB 13.5 (L) 07/08/2022    WBC 7.82 07/08/2022    HCT 41.0 07/08/2022      Lab Results   Component Value Date    HGBA1C 4.9 02/15/2022     07/08/2022    BUN 21 (H) 07/08/2022     Lab Results   Component Value Date    LDLCALC 73.6 10/18/2021         Assessment:       1. Stage 3b chronic kidney disease     2. Morbid obesity    3. Hypertension, unspecified type    4. Anemia, unspecified type    5. Secondary hyperparathyroidism        Plan:   CKD stage 3B c eGFR 29-34 mL/min - likely 2/2 longstanding poorly controlled HTN.  Stable (recent sCr actually better than previous baseline). Non-proteinuric.  Educated patient to control BP, BG, remain well-hydrated, and avoid NSAIDs to prevent progression of CKD.  Moderate risk of progression to ESRD in the next 5 years.    UPCR Previously proteinuric but it improved. On olmesartan   Acid-base WNL   Renal osteodystrophy Ca, phos okay. PTH elevated. On Vitamin D 2000 daily OTC.   Anemia Hgb at goal for CKD.   DM N/a   Lipid Management On statin now. Recommend avoiding fenofibrate.   ESRD planning Anticipatory guidance provided about timing of dialysis. Start discussions and planning when eGFR is about 20 mL/min; most patients start dialysis between 5-10 mL/min.  - Discussed importance of weight loss (obesity), smoking cessation, and routine prevention in order to be eligible for transplant if he progresses.      HTN -WNL on amlodipine 10 mg, chlorthalidone 25 mg, hydralazine 100 mg TID, olmesartan 40 mg,    - now off Hctz; says back pain has improved off it; also off spironolactone 2/2 breast tenderness    Smoking cessation - Not interested in smoking cessation class currently. He has decreased his cigarette usage.    Note: pt is participated in [employer] study. Filled out post-survey previously.     All questions patient had were answered.  Asked if further questions. None. F/u in clinic in 3 mos with labs and urine prior to next visit or sooner if needed.  ER for emergency concerns.     Summary of Plan:  1. avoid NSAID/ bactrim/ IV contrast/ gadolinium/ aminoglycoside where possible  2. Low salt diet  3. Work on weight loss  4. Work on smoking cessation   5. RTC in 3 mos

## 2022-08-15 ENCOUNTER — OFFICE VISIT (OUTPATIENT)
Dept: INTERNAL MEDICINE | Facility: CLINIC | Age: 57
End: 2022-08-15
Payer: MEDICARE

## 2022-08-15 VITALS
SYSTOLIC BLOOD PRESSURE: 132 MMHG | HEIGHT: 73 IN | HEART RATE: 90 BPM | DIASTOLIC BLOOD PRESSURE: 80 MMHG | BODY MASS INDEX: 41.75 KG/M2 | WEIGHT: 315 LBS | OXYGEN SATURATION: 96 %

## 2022-08-15 DIAGNOSIS — Z12.2 SCREENING FOR LUNG CANCER: ICD-10-CM

## 2022-08-15 DIAGNOSIS — Z72.0 TOBACCO ABUSE: ICD-10-CM

## 2022-08-15 DIAGNOSIS — Z09 FOLLOW UP: Primary | ICD-10-CM

## 2022-08-15 DIAGNOSIS — N18.32 STAGE 3B CHRONIC KIDNEY DISEASE: ICD-10-CM

## 2022-08-15 DIAGNOSIS — E66.01 MORBID OBESITY WITH BMI OF 45.0-49.9, ADULT: ICD-10-CM

## 2022-08-15 DIAGNOSIS — I10 BENIGN ESSENTIAL HTN: ICD-10-CM

## 2022-08-15 DIAGNOSIS — F12.20 MODERATE TETRAHYDROCANNABINOL (THC) DEPENDENCE: ICD-10-CM

## 2022-08-15 DIAGNOSIS — Z87.891 PERSONAL HISTORY OF NICOTINE DEPENDENCE: ICD-10-CM

## 2022-08-15 PROCEDURE — 99999 PR PBB SHADOW E&M-EST. PATIENT-LVL IV: ICD-10-PCS | Mod: PBBFAC,,, | Performed by: INTERNAL MEDICINE

## 2022-08-15 PROCEDURE — 99214 OFFICE O/P EST MOD 30 MIN: CPT | Mod: S$PBB,,, | Performed by: INTERNAL MEDICINE

## 2022-08-15 PROCEDURE — 99214 OFFICE O/P EST MOD 30 MIN: CPT | Mod: PBBFAC | Performed by: INTERNAL MEDICINE

## 2022-08-15 PROCEDURE — 99214 PR OFFICE/OUTPT VISIT, EST, LEVL IV, 30-39 MIN: ICD-10-PCS | Mod: S$PBB,,, | Performed by: INTERNAL MEDICINE

## 2022-08-15 PROCEDURE — 99999 PR PBB SHADOW E&M-EST. PATIENT-LVL IV: CPT | Mod: PBBFAC,,, | Performed by: INTERNAL MEDICINE

## 2022-08-15 NOTE — PROGRESS NOTES
Subjective:       Patient ID: Fausto Pandey is a 56 y.o. male.    Chief Complaint: Follow-up      HPI  Fausto Pandey is a 56 y.o. year old male with HTN, CKD 3b, HLD, tobacco abuse, THC dependence, environmental allergies presents for follow up. Doing well since last visit. Denies any current issues.     Review of Systems   Constitutional: Negative for activity change, appetite change, chills, fatigue, fever and unexpected weight change.   HENT: Negative for congestion, rhinorrhea and sore throat.    Eyes: Negative for visual disturbance.   Respiratory: Negative for shortness of breath.    Cardiovascular: Negative for chest pain.   Gastrointestinal: Negative for abdominal pain, diarrhea, nausea and vomiting.   Genitourinary: Negative for difficulty urinating and dysuria.   Musculoskeletal: Negative for arthralgias, back pain and myalgias.   Skin: Negative for color change and rash.   Neurological: Negative for dizziness, weakness and headaches.         Past Medical History:   Diagnosis Date    Benign essential HTN 8/22/2017    Chronic kidney disease, stage III (moderate) 8/22/2017    Internal derangement of left knee 10/9/2017        Prior to Admission medications    Medication Sig Start Date End Date Taking? Authorizing Provider   acetaminophen (TYLENOL) 500 MG tablet Take 1 tablet (500 mg total) by mouth every 6 (six) hours as needed for Pain. 6/29/19  Yes Shayna Mitchell MD   amLODIPine (NORVASC) 10 MG tablet TAKE 1 TABLET(10 MG) BY MOUTH EVERY DAY 1/13/22  Yes Ivan Thornton MD   atorvastatin (LIPITOR) 80 MG tablet TAKE 1 TABLET(80 MG) BY MOUTH EVERY DAY 11/1/21  Yes Brisa Lynn MD   cetirizine (ZYRTEC) 10 MG tablet Take 1 tablet (10 mg total) by mouth 2 (two) times daily. 12/13/18  Yes Enio Sanchez MD   chlorthalidone (HYGROTEN) 25 MG Tab Take 1 tablet (25 mg total) by mouth once daily. 4/8/22 4/8/23 Yes Roxanna Ibarra, NIRU   cholecalciferol, vitamin D3, (VITAMIN D3) 50 mcg (2,000  "unit) Cap capsule Take 1 capsule by mouth once daily.   Yes Historical Provider   hydrALAZINE (APRESOLINE) 100 MG tablet Take 1 tablet (100 mg total) by mouth every 8 (eight) hours. 2/15/22 2/15/23 Yes Ivan Thornton MD   LIDOcaine (LIDODERM) 5 % Place 1 patch onto the skin once daily. Remove & Discard patch within 12 hours or as directed by MD 4/6/22  Yes Valencia Salcedo PA-C   methocarbamoL (ROBAXIN) 750 MG Tab Take 1 tablet (750 mg total) by mouth 3 (three) times daily as needed (pain). 4/28/22  Yes Ivan Thornton MD   metoprolol succinate (TOPROL-XL) 25 MG 24 hr tablet TAKE 1 TABLET(25 MG) BY MOUTH EVERY DAY 5/16/22  Yes Roxanna Ibarra NP   olmesartan (BENICAR) 40 MG tablet TAKE 1 TABLET(40 MG) BY MOUTH EVERY DAY 5/16/22  Yes Roxanna Ibarra NP        Past medical history, surgical history, and family medical history reviewed and updated as appropriate.    Medications and allergies reviewed.     Objective:          Vitals:    08/15/22 0937   BP: 132/80   BP Location: Right arm   Patient Position: Sitting   BP Method: Large (Manual)   Pulse: 90   SpO2: 96%   Weight: (!) 160.3 kg (353 lb 6.4 oz)   Height: 6' 1" (1.854 m)     Body mass index is 46.63 kg/m².  Physical Exam  Constitutional:       General: He is not in acute distress.     Appearance: He is well-developed.   HENT:      Head: Normocephalic and atraumatic.      Nose: Nose normal.   Eyes:      General: No scleral icterus.     Extraocular Movements: Extraocular movements intact.   Neck:      Thyroid: No thyromegaly.      Vascular: No JVD.      Trachea: No tracheal deviation.   Cardiovascular:      Rate and Rhythm: Normal rate and regular rhythm.      Heart sounds: Normal heart sounds. No murmur heard.    No friction rub. No gallop.   Pulmonary:      Effort: Pulmonary effort is normal. No respiratory distress.      Breath sounds: Rhonchi present. No wheezing or rales.      Comments: Diffused scattered rhonchi  Abdominal:      General: Bowel sounds " are normal. There is no distension.      Palpations: Abdomen is soft. There is no mass.      Tenderness: There is no abdominal tenderness.   Musculoskeletal:         General: No tenderness. Normal range of motion.      Cervical back: Normal range of motion and neck supple.   Lymphadenopathy:      Cervical: No cervical adenopathy.   Skin:     General: Skin is warm and dry.      Findings: No rash.   Neurological:      Mental Status: He is alert and oriented to person, place, and time.      Cranial Nerves: No cranial nerve deficit.      Deep Tendon Reflexes: Reflexes normal.   Psychiatric:         Behavior: Behavior normal.         Lab Results   Component Value Date    WBC 7.82 07/08/2022    HGB 13.5 (L) 07/08/2022    HCT 41.0 07/08/2022     07/08/2022    CHOL 130 10/18/2021    TRIG 142 10/18/2021    HDL 28 (L) 10/18/2021    ALT 22 10/18/2021    AST 27 10/18/2021     07/08/2022    K 4.1 07/08/2022     07/08/2022    CREATININE 2.8 (H) 07/08/2022    BUN 21 (H) 07/08/2022    CO2 26 07/08/2022    TSH 1.709 10/20/2020    HGBA1C 4.9 02/15/2022       Assessment:       1. Follow up    2. Tobacco abuse    3. Moderate tetrahydrocannabinol (THC) dependence    4. Screening for lung cancer    5. Personal history of nicotine dependence     6. Benign essential HTN    7. Stage 3b chronic kidney disease    8. Morbid obesity with BMI of 45.0-49.9, adult          Plan:     Fausto was seen today for follow-up.    Diagnoses and all orders for this visit:    Follow up    Tobacco abuse  Comments:  started age 20; 1/2 ppd x 36 years. has never had CT screening for lung cancer  Orders:  -     CT Chest Lung Screening Low Dose; Future    Moderate tetrahydrocannabinol (THC) dependence  Comments:  daily use, 2-3 cigarillos a day; not affecting ADLs    Screening for lung cancer  -     CT Chest Lung Screening Low Dose; Future    Personal history of nicotine dependence   -     CT Chest Lung Screening Low Dose; Future    Benign  essential HTN    Stage 3b chronic kidney disease    Morbid obesity with BMI of 45.0-49.9, adult    HTN - controlled, no changes to current management  HLD - controlled on lipitor 80, no changes to current management  Tobacco and THC use - discussed cessation, due for lung cancer screening. CT ordered.  CKD 3b - baseline cr 2.5-2.7; avoid nephrotoxics. Continue to monitor, followed by neurology. Is enrolled in [employer].   Anemia - stable, appropriate for patient's level of kidney function.  Due for covid-19 booster; vaccine hesitancy.     Health maintenance reviewed with patient. Patient is due for pneumonia vaccination, covid-19 booster. Patient willing to get covid-19 booster today.    Follow up in about 6 months (around 2/15/2023).    Ivan Thornton MD  Internal Medicine / Primary Care  [facility]  8/15/2022

## 2022-08-15 NOTE — PATIENT INSTRUCTIONS
Covid-19 pfizer booster  Schedule CT chest for lung cancer screening.  Return to clinic in 6 months or sooner if needed.

## 2022-08-19 ENCOUNTER — HOSPITAL ENCOUNTER (OUTPATIENT)
Dept: RADIOLOGY | Facility: HOSPITAL | Age: 57
Discharge: HOME | End: 2022-08-19
Attending: INTERNAL MEDICINE
Payer: MEDICARE

## 2022-08-19 DIAGNOSIS — Z12.2 SCREENING FOR LUNG CANCER: ICD-10-CM

## 2022-08-19 DIAGNOSIS — Z72.0 TOBACCO ABUSE: ICD-10-CM

## 2022-08-19 DIAGNOSIS — Z87.891 PERSONAL HISTORY OF NICOTINE DEPENDENCE: ICD-10-CM

## 2022-08-19 PROCEDURE — 71271 CT THORAX LUNG CANCER SCR C-: CPT | Mod: 26,GC,, | Performed by: RADIOLOGY

## 2022-08-19 PROCEDURE — 71271 CT THORAX LUNG CANCER SCR C-: CPT | Mod: TC

## 2022-08-19 PROCEDURE — 71271 CT CHEST LUNG SCREENING LOW DOSE: ICD-10-PCS | Mod: 26,GC,, | Performed by: RADIOLOGY

## 2022-08-26 ENCOUNTER — PATIENT MESSAGE (OUTPATIENT)
Dept: INTERNAL MEDICINE | Facility: CLINIC | Age: 57
End: 2022-08-26
Payer: MEDICARE

## 2022-08-26 ENCOUNTER — TELEPHONE (OUTPATIENT)
Dept: INTERNAL MEDICINE | Facility: CLINIC | Age: 57
End: 2022-08-26
Payer: MEDICARE

## 2022-08-26 NOTE — TELEPHONE ENCOUNTER
----- Message from Ivan Thornton MD sent at 8/19/2022 12:58 PM CDT -----  Benign findings in lung  Small nodule noted in left adrenal gland, will continue to monitor.

## 2022-09-23 ENCOUNTER — IMMUNIZATION (OUTPATIENT)
Dept: INTERNAL MEDICINE | Facility: CLINIC | Age: 57
End: 2022-09-23
Payer: MEDICARE

## 2022-09-23 DIAGNOSIS — Z23 NEED FOR VACCINATION: Primary | ICD-10-CM

## 2022-09-23 PROCEDURE — 0124A COVID-19, MRNA, LNP-S, BIVALENT BOOSTER, PF, 30 MCG/0.3 ML DOSE: CPT | Mod: PBBFAC,CV19

## 2022-09-23 PROCEDURE — 91312 COVID-19, MRNA, LNP-S, BIVALENT BOOSTER, PF, 30 MCG/0.3 ML DOSE: CPT | Mod: PBBFAC

## 2022-10-14 ENCOUNTER — LAB VISIT (OUTPATIENT)
Dept: LAB | Facility: OTHER | Age: 57
End: 2022-10-14
Attending: NURSE PRACTITIONER
Payer: MEDICARE

## 2022-10-14 DIAGNOSIS — N18.32 STAGE 3B CHRONIC KIDNEY DISEASE: ICD-10-CM

## 2022-10-14 LAB
25(OH)D3+25(OH)D2 SERPL-MCNC: 53 NG/ML (ref 30–96)
ALBUMIN SERPL BCP-MCNC: 3.5 G/DL (ref 3.5–5.2)
ANION GAP SERPL CALC-SCNC: 6 MMOL/L (ref 8–16)
BASOPHILS # BLD AUTO: 0.05 K/UL (ref 0–0.2)
BASOPHILS NFR BLD: 0.7 % (ref 0–1.9)
BUN SERPL-MCNC: 25 MG/DL (ref 6–20)
CALCIUM SERPL-MCNC: 8.9 MG/DL (ref 8.7–10.5)
CHLORIDE SERPL-SCNC: 107 MMOL/L (ref 95–110)
CO2 SERPL-SCNC: 27 MMOL/L (ref 23–29)
CREAT SERPL-MCNC: 2.4 MG/DL (ref 0.5–1.4)
DIFFERENTIAL METHOD: ABNORMAL
EOSINOPHIL # BLD AUTO: 0.4 K/UL (ref 0–0.5)
EOSINOPHIL NFR BLD: 6.4 % (ref 0–8)
ERYTHROCYTE [DISTWIDTH] IN BLOOD BY AUTOMATED COUNT: 13.2 % (ref 11.5–14.5)
EST. GFR  (NO RACE VARIABLE): 31 ML/MIN/1.73 M^2
GLUCOSE SERPL-MCNC: 96 MG/DL (ref 70–110)
HCT VFR BLD AUTO: 40.5 % (ref 40–54)
HGB BLD-MCNC: 13.9 G/DL (ref 14–18)
IMM GRANULOCYTES # BLD AUTO: 0.02 K/UL (ref 0–0.04)
IMM GRANULOCYTES NFR BLD AUTO: 0.3 % (ref 0–0.5)
LYMPHOCYTES # BLD AUTO: 3 K/UL (ref 1–4.8)
LYMPHOCYTES NFR BLD: 43.8 % (ref 18–48)
MCH RBC QN AUTO: 34.5 PG (ref 27–31)
MCHC RBC AUTO-ENTMCNC: 34.3 G/DL (ref 32–36)
MCV RBC AUTO: 101 FL (ref 82–98)
MONOCYTES # BLD AUTO: 0.7 K/UL (ref 0.3–1)
MONOCYTES NFR BLD: 10.8 % (ref 4–15)
NEUTROPHILS # BLD AUTO: 2.6 K/UL (ref 1.8–7.7)
NEUTROPHILS NFR BLD: 38 % (ref 38–73)
NRBC BLD-RTO: 0 /100 WBC
PHOSPHATE SERPL-MCNC: 3.1 MG/DL (ref 2.7–4.5)
PLATELET # BLD AUTO: 226 K/UL (ref 150–450)
PMV BLD AUTO: 10.1 FL (ref 9.2–12.9)
POTASSIUM SERPL-SCNC: 3.9 MMOL/L (ref 3.5–5.1)
PTH-INTACT SERPL-MCNC: 166.9 PG/ML (ref 9–77)
RBC # BLD AUTO: 4.03 M/UL (ref 4.6–6.2)
SODIUM SERPL-SCNC: 140 MMOL/L (ref 136–145)
WBC # BLD AUTO: 6.87 K/UL (ref 3.9–12.7)

## 2022-10-14 PROCEDURE — 83970 ASSAY OF PARATHORMONE: CPT | Performed by: NURSE PRACTITIONER

## 2022-10-14 PROCEDURE — 36415 COLL VENOUS BLD VENIPUNCTURE: CPT | Performed by: NURSE PRACTITIONER

## 2022-10-14 PROCEDURE — 82306 VITAMIN D 25 HYDROXY: CPT | Performed by: NURSE PRACTITIONER

## 2022-10-14 PROCEDURE — 80069 RENAL FUNCTION PANEL: CPT | Performed by: NURSE PRACTITIONER

## 2022-10-14 PROCEDURE — 85025 COMPLETE CBC W/AUTO DIFF WBC: CPT | Performed by: NURSE PRACTITIONER

## 2022-10-28 ENCOUNTER — OFFICE VISIT (OUTPATIENT)
Dept: NEPHROLOGY | Facility: CLINIC | Age: 57
End: 2022-10-28
Payer: MEDICARE

## 2022-10-28 VITALS
DIASTOLIC BLOOD PRESSURE: 90 MMHG | HEIGHT: 73 IN | OXYGEN SATURATION: 99 % | WEIGHT: 315 LBS | HEART RATE: 80 BPM | BODY MASS INDEX: 41.75 KG/M2 | SYSTOLIC BLOOD PRESSURE: 137 MMHG

## 2022-10-28 DIAGNOSIS — N25.81 SECONDARY HYPERPARATHYROIDISM: ICD-10-CM

## 2022-10-28 DIAGNOSIS — D64.9 ANEMIA, UNSPECIFIED TYPE: ICD-10-CM

## 2022-10-28 DIAGNOSIS — I10 HYPERTENSION, UNSPECIFIED TYPE: ICD-10-CM

## 2022-10-28 DIAGNOSIS — N18.32 STAGE 3B CHRONIC KIDNEY DISEASE: Primary | ICD-10-CM

## 2022-10-28 DIAGNOSIS — E66.01 MORBID OBESITY: ICD-10-CM

## 2022-10-28 PROCEDURE — 99214 OFFICE O/P EST MOD 30 MIN: CPT | Mod: S$PBB,,, | Performed by: NURSE PRACTITIONER

## 2022-10-28 PROCEDURE — 99213 OFFICE O/P EST LOW 20 MIN: CPT | Mod: PBBFAC | Performed by: NURSE PRACTITIONER

## 2022-10-28 PROCEDURE — 99214 PR OFFICE/OUTPT VISIT, EST, LEVL IV, 30-39 MIN: ICD-10-PCS | Mod: S$PBB,,, | Performed by: NURSE PRACTITIONER

## 2022-10-28 PROCEDURE — 99999 PR PBB SHADOW E&M-EST. PATIENT-LVL III: ICD-10-PCS | Mod: PBBFAC,,, | Performed by: NURSE PRACTITIONER

## 2022-10-28 PROCEDURE — 99999 PR PBB SHADOW E&M-EST. PATIENT-LVL III: CPT | Mod: PBBFAC,,, | Performed by: NURSE PRACTITIONER

## 2022-10-28 NOTE — PROGRESS NOTES
Subjective:       Patient ID: Fausto Pandey is a 57 y.o. AA male who presents for follow-up evaluation of   No chief complaint on file.      HPI     Last seen in October 2020.     Patient presents for new evaluation of CKD.  Baseline creatinine difficult to determine due to infrequency of available labs; appears to be 2.5-2.7 mg/dL.      Recently with breast tenderness and was instructed to stop spironolactone.    Home BPs: 120s/80s    Significant hx includes HTN.    Social history: smokes ~4 cigarettes daily; no ETOH     The patient denies taking NSAIDs. Previously said he uses marijuana.     Significant family hx includes: HTN; no known renal disease     Last renal doppler US: November 2020, reviewed.    Update 12/10/21:  Last seen April 2021.  Baseline sCr: 2.5-2.7 mg/dL.  Home BPs: 140s/80s after  Denies NSAID use. Denies recent hospitalizations.  Still smoking cigarettes (no longer daily). Still smoking marijuana.  Plans for inguinal hernia surgery this month; has been having significant groin pain that impairs ability to exercise.    Update 4/8/22:  Returns for f/u of CKD.  Baseline sCr: 2.5-2.7 mg/dL, but recent levels have been in the 2.4 range.  Home BPs: 150/80s at home.  Went to ER with back pain. Says muscle relaxers and patch prescribed are not helping. Said tylenol helped in ER but not helping at home. Unsure if it is a different dose.  Has started walking on the treadmill again, trying to lose weight. Unable to right now due to back pain.    Update 7/25/22:  Returns for f/u of CKD.  Baseline sCr: 2.5-2.7 mg/dL.  Started chlorthalidone last visit.    Home BPs: 130s/80s   Still smoking, but has decreased.  Started exercising again recently.    Denies NSAIDs. Denies hospitalizations.    Update 10/28/22:  Returns for f/u of CKD.  Baseline sCr: 2.5-2.7 mg/dL.  Home BPs: has not been taking    Quit smoking cigarettes on 8/27.  Says he is exercising. Has gym membership.      Review of Systems  "  Respiratory: Negative for shortness of breath.    Cardiovascular: Negative for leg swelling.  Gastrointestinal: Negative for diarrhea, nausea and vomiting.   Genitourinary: Negative for difficulty urinating, dysuria, frequency, hematuria and urgency.   Musculoskeletal: Negative for back pain.      Objective:       Blood pressure (!) 137/90, pulse 80, height 6' 1" (1.854 m), weight (!) 162.2 kg (357 lb 9.4 oz), SpO2 99 %.  Physical Exam  Vitals reviewed.   Constitutional:       General: He is not in acute distress.     Appearance: Normal appearance. He is well-developed. He is obese. He is not ill-appearing or diaphoretic.   Cardiovascular:      Rate and Rhythm: Normal rate.   Pulmonary:      Effort: Pulmonary effort is normal. No respiratory distress.   Abdominal:      Tenderness: There is no right CVA tenderness or left CVA tenderness.   Musculoskeletal:      Cervical back: Neck supple.      Right lower leg: no edema.      Left lower leg: no edema.   Skin:     General: Skin is warm and dry.   Neurological:      Mental Status: He is alert and oriented to person, place, and time.   Psychiatric:         Mood and Affect: Mood normal.         Behavior: Behavior normal.         Thought Content: Thought content normal.         Judgment: Judgment normal.           Lab Results   Component Value Date    CREATININE 2.4 (H) 10/14/2022    URICACID 7.7 (H) 08/22/2017     Prot/Creat Ratio, Urine   Date Value Ref Range Status   10/14/2022 0.12 0.00 - 0.20 Final   07/08/2022 0.11 0.00 - 0.20 Final   04/01/2022 0.07 0.00 - 0.20 Final     Lab Results   Component Value Date     10/14/2022    K 3.9 10/14/2022    CO2 27 10/14/2022     10/14/2022     Lab Results   Component Value Date    .9 (H) 10/14/2022    CALCIUM 8.9 10/14/2022    PHOS 3.1 10/14/2022     Lab Results   Component Value Date    HGB 13.9 (L) 10/14/2022    WBC 6.87 10/14/2022    HCT 40.5 10/14/2022      Lab Results   Component Value Date    HGBA1C 4.9 " 02/15/2022     10/14/2022    BUN 25 (H) 10/14/2022     Lab Results   Component Value Date    LDLCALC 73.6 10/18/2021         Assessment:       1. Stage 3b chronic kidney disease    2. Hypertension, unspecified type    3. Secondary hyperparathyroidism    4. Anemia, unspecified type    5. Morbid obesity          Plan:   CKD stage 3B c eGFR 29-34 mL/min - clinically 2/2 longstanding poorly controlled HTN.  Stable. Non-proteinuric.  Educated patient to control BP, BG, remain well-hydrated, and avoid NSAIDs to prevent progression of CKD.  Moderate risk of progression to ESRD in the next 5 years.    He has quit smoking cigarettes.    UPCR Previously proteinuric but it improved. On olmesartan   Acid-base WNL   Renal osteodystrophy Ca, phos okay. PTH elevated. On Vitamin D 2000 daily OTC.   Anemia Hgb at goal for CKD.   DM N/a   Lipid Management On statin now. Recommend avoiding fenofibrate.   ESRD planning Anticipatory guidance provided about timing of dialysis. Start discussions and planning when eGFR is about 20 mL/min; most patients start dialysis between 5-10 mL/min.  - Discussed importance of weight loss (obesity), smoking cessation, and routine prevention in order to be eligible for transplant if he progresses.      HTN -High today on amlodipine 10 mg, chlorthalidone 25 mg, hydralazine 100 mg TID, olmesartan 40 mg, metoprolol succinate 25 mg   - now off Hctz; says back pain has improved off it; also off spironolactone 2/2 breast tenderness  - Monitor home BPs and turn in in 2 weeks    Morbid obesity - Weight has been stable. He says he gained it over the pandemic when he was sedentary. Planning to start working out in gym. Declined offer for referral to bariatrics medicine.    All questions patient had were answered.  Asked if further questions. None. F/u in clinic in 3 mos with labs and urine prior to next visit or sooner if needed.  ER for emergency concerns.     Summary of Plan:  Take BP twice a day x 2  weeks. Write down. Send via portal  avoid NSAID/ bactrim/ IV contrast/ gadolinium/ aminoglycoside where possible  RTC in 3 mos

## 2022-11-09 ENCOUNTER — PATIENT MESSAGE (OUTPATIENT)
Dept: INTERNAL MEDICINE | Facility: CLINIC | Age: 57
End: 2022-11-09
Payer: MEDICARE

## 2022-11-12 ENCOUNTER — HOSPITAL ENCOUNTER (OUTPATIENT)
Dept: RADIOLOGY | Facility: HOSPITAL | Age: 57
Discharge: HOME | End: 2022-11-12
Attending: INTERNAL MEDICINE
Payer: MEDICARE

## 2022-11-12 ENCOUNTER — OFFICE VISIT (OUTPATIENT)
Dept: INTERNAL MEDICINE | Facility: CLINIC | Age: 57
End: 2022-11-12
Payer: MEDICARE

## 2022-11-12 VITALS
BODY MASS INDEX: 40.43 KG/M2 | HEART RATE: 93 BPM | OXYGEN SATURATION: 96 % | DIASTOLIC BLOOD PRESSURE: 88 MMHG | SYSTOLIC BLOOD PRESSURE: 134 MMHG | WEIGHT: 315 LBS | HEIGHT: 74 IN

## 2022-11-12 DIAGNOSIS — M25.522 LEFT ELBOW PAIN: ICD-10-CM

## 2022-11-12 DIAGNOSIS — M10.9 GOUT, UNSPECIFIED CAUSE, UNSPECIFIED CHRONICITY, UNSPECIFIED SITE: Primary | ICD-10-CM

## 2022-11-12 PROCEDURE — 73080 X-RAY EXAM OF ELBOW: CPT | Mod: 26,LT,, | Performed by: RADIOLOGY

## 2022-11-12 PROCEDURE — 99999 PR PBB SHADOW E&M-EST. PATIENT-LVL IV: ICD-10-PCS | Mod: PBBFAC,,, | Performed by: INTERNAL MEDICINE

## 2022-11-12 PROCEDURE — 99214 OFFICE O/P EST MOD 30 MIN: CPT | Mod: PBBFAC | Performed by: INTERNAL MEDICINE

## 2022-11-12 PROCEDURE — 99213 PR OFFICE/OUTPT VISIT, EST, LEVL III, 20-29 MIN: ICD-10-PCS | Mod: S$PBB,,, | Performed by: INTERNAL MEDICINE

## 2022-11-12 PROCEDURE — 73080 XR ELBOW COMPLETE 3 VIEW LEFT: ICD-10-PCS | Mod: 26,LT,, | Performed by: RADIOLOGY

## 2022-11-12 PROCEDURE — 99999 PR PBB SHADOW E&M-EST. PATIENT-LVL IV: CPT | Mod: PBBFAC,,, | Performed by: INTERNAL MEDICINE

## 2022-11-12 PROCEDURE — 99213 OFFICE O/P EST LOW 20 MIN: CPT | Mod: S$PBB,,, | Performed by: INTERNAL MEDICINE

## 2022-11-12 PROCEDURE — 73080 X-RAY EXAM OF ELBOW: CPT | Mod: TC,LT

## 2022-11-12 RX ORDER — TRAMADOL HYDROCHLORIDE 50 MG/1
50-100 TABLET ORAL NIGHTLY PRN
Qty: 20 TABLET | Refills: 0 | Status: SHIPPED | OUTPATIENT
Start: 2022-11-12 | End: 2024-02-16 | Stop reason: HOSPADM

## 2022-11-12 NOTE — PATIENT INSTRUCTIONS
[facility]    Prescription of tramadol sent to your [facility] - take only at night time, 1-2 tablets, start with 1 first.     RICE - rest, ice, compression, elevation    Let office know if not improving in the next 4 weeks    Return to clinic as scheduled on 2/15/2023

## 2022-11-16 NOTE — PROGRESS NOTES
Subjective:       Patient ID: Fausto Pandey is a 57 y.o. male.    Chief Complaint: Elbow Injury      HPI  Fausto Pandey is a 57 y.o. year old male established pt presents for urgent care appointment of L elbow pain. Onset few days ago, throbbing in sensation, no recent trauma. Painful to touch.    Review of Systems   Respiratory:  Negative for chest tightness.    Cardiovascular:  Negative for chest pain.   Musculoskeletal:  Positive for arthralgias.       Past Medical History:   Diagnosis Date    Benign essential HTN 8/22/2017    Chronic kidney disease, stage III (moderate) 8/22/2017    Internal derangement of left knee 10/9/2017        Prior to Admission medications    Medication Sig Start Date End Date Taking? Authorizing Provider   acetaminophen (TYLENOL) 500 MG tablet Take 1 tablet (500 mg total) by mouth every 6 (six) hours as needed for Pain. 6/29/19  Yes Shayna Mitchell MD   amLODIPine (NORVASC) 10 MG tablet TAKE 1 TABLET(10 MG) BY MOUTH EVERY DAY 1/13/22  Yes Ivan Thornton MD   atorvastatin (LIPITOR) 80 MG tablet TAKE 1 TABLET(80 MG) BY MOUTH EVERY DAY 11/1/21  Yes Brisa Lynn MD   cetirizine (ZYRTEC) 10 MG tablet Take 1 tablet (10 mg total) by mouth 2 (two) times daily. 12/13/18  Yes Enio Sanchez MD   chlorthalidone (HYGROTEN) 25 MG Tab Take 1 tablet (25 mg total) by mouth once daily. 4/8/22 4/8/23 Yes Roxanna Ibarra NP   cholecalciferol, vitamin D3, (VITAMIN D3) 50 mcg (2,000 unit) Cap capsule Take 1 capsule by mouth once daily.   Yes Historical Provider   hydrALAZINE (APRESOLINE) 100 MG tablet Take 1 tablet (100 mg total) by mouth every 8 (eight) hours. 2/15/22 2/15/23 Yes Ivan Thornton MD   metoprolol succinate (TOPROL-XL) 25 MG 24 hr tablet TAKE 1 TABLET(25 MG) BY MOUTH EVERY DAY 5/16/22  Yes Roxanna Ibarra NP   olmesartan (BENICAR) 40 MG tablet TAKE 1 TABLET(40 MG) BY MOUTH EVERY DAY 5/16/22  Yes Roxanna Ibarra NP   LIDOcaine (LIDODERM) 5 % Place 1 patch onto the  "skin once daily. Remove & Discard patch within 12 hours or as directed by MD  Patient not taking: Reported on 10/28/2022 4/6/22   Valencia Salcedo PA-C   methocarbamoL (ROBAXIN) 750 MG Tab Take 1 tablet (750 mg total) by mouth 3 (three) times daily as needed (pain).  Patient not taking: Reported on 10/28/2022 4/28/22   Ivan Thornton MD   traMADoL (ULTRAM) 50 mg tablet Take 1-2 tablets ( mg total) by mouth nightly as needed for Pain. 11/12/22   Ivan Thornton MD        Past medical history, surgical history, and family medical history reviewed and updated as appropriate.    Medications and allergies reviewed.     Objective:          Vitals:    11/12/22 1031   BP: 134/88   BP Location: Right arm   Patient Position: Sitting   BP Method: Large (Manual)   Pulse: 93   SpO2: 96%   Weight: (!) 161.3 kg (355 lb 9.6 oz)   Height: 6' 2" (1.88 m)     Body mass index is 45.66 kg/m².  Physical Exam  Constitutional:       General: He is not in acute distress.     Appearance: He is well-developed.   HENT:      Head: Normocephalic and atraumatic.      Nose: Nose normal.   Eyes:      General: No scleral icterus.     Extraocular Movements: Extraocular movements intact.   Neck:      Thyroid: No thyromegaly.      Vascular: No JVD.      Trachea: No tracheal deviation.   Cardiovascular:      Rate and Rhythm: Normal rate and regular rhythm.   Pulmonary:      Effort: Pulmonary effort is normal. No respiratory distress.      Breath sounds: No wheezing or rales.   Abdominal:      General: There is no distension.      Palpations: Abdomen is soft. There is no mass.      Tenderness: There is no abdominal tenderness.   Musculoskeletal:         General: No tenderness. Normal range of motion.      Cervical back: Normal range of motion and neck supple.   Lymphadenopathy:      Cervical: No cervical adenopathy.   Skin:     General: Skin is warm and dry.      Findings: No rash.   Neurological:      Mental Status: He is alert and oriented to " person, place, and time.      Cranial Nerves: No cranial nerve deficit.      Deep Tendon Reflexes: Reflexes normal.   Psychiatric:         Behavior: Behavior normal.       Lab Results   Component Value Date    WBC 6.87 10/14/2022    HGB 13.9 (L) 10/14/2022    HCT 40.5 10/14/2022     10/14/2022    CHOL 130 10/18/2021    TRIG 142 10/18/2021    HDL 28 (L) 10/18/2021    ALT 22 10/18/2021    AST 27 10/18/2021     11/12/2022    K 4.5 11/12/2022     11/12/2022    CREATININE 2.6 (H) 11/12/2022    BUN 28 (H) 11/12/2022    CO2 29 11/12/2022    TSH 1.709 10/20/2020    HGBA1C 4.9 02/15/2022       Assessment:       1. Gout, unspecified cause, unspecified chronicity, unspecified site    2. Left elbow pain          Plan:     Fausto was seen today for elbow injury.    Diagnoses and all orders for this visit:    Gout, unspecified cause, unspecified chronicity, unspecified site  -     BASIC METABOLIC PANEL; Future  -     URIC ACID; Future  -     traMADoL (ULTRAM) 50 mg tablet; Take 1-2 tablets ( mg total) by mouth nightly as needed for Pain.    Left elbow pain  -     URIC ACID; Future  -     Sedimentation rate; Future  -     C-REACTIVE PROTEIN; Future  -     X-Ray Elbow Complete 3 view Left; Future  -     traMADoL (ULTRAM) 50 mg tablet; Take 1-2 tablets ( mg total) by mouth nightly as needed for Pain.    Suspect Gout flare up of elbow 2'/2 dietary indiscretions. CKD 4. Tramadol prn pain control. Discussed avoidance of high purine foods.   Will check uric acid levels, if elevated, will start with renally dosed allopurinol with plans for titration. Colchicine cautious use. Will likely start steroid burst if not improving.    Health maintenance reviewed with patient.     Follow up in 3 months (on 2/15/2023).    Ivan Thornton MD  Internal Medicine / Primary Care  [facility]  11/16/2022

## 2022-11-21 ENCOUNTER — PATIENT MESSAGE (OUTPATIENT)
Dept: INTERNAL MEDICINE | Facility: CLINIC | Age: 57
End: 2022-11-21
Payer: MEDICARE

## 2022-11-21 DIAGNOSIS — M10.9 GOUT, UNSPECIFIED CAUSE, UNSPECIFIED CHRONICITY, UNSPECIFIED SITE: Primary | ICD-10-CM

## 2022-11-21 RX ORDER — COLCHICINE 0.6 MG/1
0.6 TABLET ORAL EVERY OTHER DAY
Qty: 15 TABLET | Refills: 2 | Status: SHIPPED | OUTPATIENT
Start: 2022-11-26 | End: 2023-08-15 | Stop reason: SDUPTHER

## 2022-11-21 RX ORDER — PREDNISONE 20 MG/1
20 TABLET ORAL 2 TIMES DAILY
Qty: 10 TABLET | Refills: 0 | Status: SHIPPED | OUTPATIENT
Start: 2022-11-21 | End: 2023-08-15

## 2022-11-21 RX ORDER — ALLOPURINOL 100 MG/1
50 TABLET ORAL EVERY OTHER DAY
Qty: 15 TABLET | Refills: 2 | Status: SHIPPED | OUTPATIENT
Start: 2022-11-26 | End: 2023-01-30

## 2022-11-30 ENCOUNTER — PATIENT MESSAGE (OUTPATIENT)
Dept: NEPHROLOGY | Facility: CLINIC | Age: 57
End: 2022-11-30
Payer: MEDICARE

## 2022-12-08 DIAGNOSIS — M10.9 GOUT, UNSPECIFIED CAUSE, UNSPECIFIED CHRONICITY, UNSPECIFIED SITE: Primary | ICD-10-CM

## 2023-01-30 ENCOUNTER — LAB VISIT (OUTPATIENT)
Dept: LAB | Facility: OTHER | Age: 58
End: 2023-01-30
Attending: NURSE PRACTITIONER
Payer: MEDICARE

## 2023-01-30 ENCOUNTER — TELEPHONE (OUTPATIENT)
Dept: INTERNAL MEDICINE | Facility: CLINIC | Age: 58
End: 2023-01-30
Payer: MEDICARE

## 2023-01-30 DIAGNOSIS — M10.9 GOUT, UNSPECIFIED CAUSE, UNSPECIFIED CHRONICITY, UNSPECIFIED SITE: ICD-10-CM

## 2023-01-30 DIAGNOSIS — N18.32 STAGE 3B CHRONIC KIDNEY DISEASE: ICD-10-CM

## 2023-01-30 LAB
ALBUMIN SERPL BCP-MCNC: 3.7 G/DL (ref 3.5–5.2)
ANION GAP SERPL CALC-SCNC: 10 MMOL/L (ref 8–16)
BASOPHILS # BLD AUTO: 0.06 K/UL (ref 0–0.2)
BASOPHILS NFR BLD: 1 % (ref 0–1.9)
BUN SERPL-MCNC: 28 MG/DL (ref 6–20)
CALCIUM SERPL-MCNC: 9.4 MG/DL (ref 8.7–10.5)
CHLORIDE SERPL-SCNC: 103 MMOL/L (ref 95–110)
CO2 SERPL-SCNC: 28 MMOL/L (ref 23–29)
CREAT SERPL-MCNC: 2.6 MG/DL (ref 0.5–1.4)
DIFFERENTIAL METHOD: ABNORMAL
EOSINOPHIL # BLD AUTO: 0.5 K/UL (ref 0–0.5)
EOSINOPHIL NFR BLD: 8.7 % (ref 0–8)
ERYTHROCYTE [DISTWIDTH] IN BLOOD BY AUTOMATED COUNT: 13.2 % (ref 11.5–14.5)
EST. GFR  (NO RACE VARIABLE): 28 ML/MIN/1.73 M^2
GLUCOSE SERPL-MCNC: 93 MG/DL (ref 70–110)
HCT VFR BLD AUTO: 44.9 % (ref 40–54)
HGB BLD-MCNC: 14.8 G/DL (ref 14–18)
IMM GRANULOCYTES # BLD AUTO: 0.01 K/UL (ref 0–0.04)
IMM GRANULOCYTES NFR BLD AUTO: 0.2 % (ref 0–0.5)
LYMPHOCYTES # BLD AUTO: 2.9 K/UL (ref 1–4.8)
LYMPHOCYTES NFR BLD: 49.5 % (ref 18–48)
MCH RBC QN AUTO: 33.3 PG (ref 27–31)
MCHC RBC AUTO-ENTMCNC: 33 G/DL (ref 32–36)
MCV RBC AUTO: 101 FL (ref 82–98)
MONOCYTES # BLD AUTO: 0.6 K/UL (ref 0.3–1)
MONOCYTES NFR BLD: 10.5 % (ref 4–15)
NEUTROPHILS # BLD AUTO: 1.8 K/UL (ref 1.8–7.7)
NEUTROPHILS NFR BLD: 30.1 % (ref 38–73)
NRBC BLD-RTO: 0 /100 WBC
PHOSPHATE SERPL-MCNC: 3.7 MG/DL (ref 2.7–4.5)
PLATELET # BLD AUTO: 224 K/UL (ref 150–450)
PMV BLD AUTO: 10.2 FL (ref 9.2–12.9)
POTASSIUM SERPL-SCNC: 3.8 MMOL/L (ref 3.5–5.1)
PTH-INTACT SERPL-MCNC: 144.3 PG/ML (ref 9–77)
RBC # BLD AUTO: 4.44 M/UL (ref 4.6–6.2)
SODIUM SERPL-SCNC: 141 MMOL/L (ref 136–145)
URATE SERPL-MCNC: 11 MG/DL (ref 3.4–7)
WBC # BLD AUTO: 5.88 K/UL (ref 3.9–12.7)

## 2023-01-30 PROCEDURE — 84550 ASSAY OF BLOOD/URIC ACID: CPT | Performed by: INTERNAL MEDICINE

## 2023-01-30 PROCEDURE — 36415 COLL VENOUS BLD VENIPUNCTURE: CPT | Performed by: NURSE PRACTITIONER

## 2023-01-30 PROCEDURE — 85025 COMPLETE CBC W/AUTO DIFF WBC: CPT | Performed by: NURSE PRACTITIONER

## 2023-01-30 PROCEDURE — 83970 ASSAY OF PARATHORMONE: CPT | Performed by: NURSE PRACTITIONER

## 2023-01-30 PROCEDURE — 80069 RENAL FUNCTION PANEL: CPT | Performed by: NURSE PRACTITIONER

## 2023-01-30 RX ORDER — ALLOPURINOL 100 MG/1
50 TABLET ORAL DAILY
Qty: 15 TABLET | Refills: 2 | Status: SHIPPED | OUTPATIENT
Start: 2023-01-30 | End: 2023-02-14

## 2023-01-30 NOTE — TELEPHONE ENCOUNTER
Called and spoke to patient about Dr Kay recommendation to change medication to daily and nit every other day. Went over directions with patient. Patient verbalized understanding with read back. Lab appt scheduled.

## 2023-02-03 ENCOUNTER — OFFICE VISIT (OUTPATIENT)
Dept: NEPHROLOGY | Facility: CLINIC | Age: 58
End: 2023-02-03
Payer: MEDICARE

## 2023-02-03 VITALS
BODY MASS INDEX: 45.29 KG/M2 | DIASTOLIC BLOOD PRESSURE: 90 MMHG | SYSTOLIC BLOOD PRESSURE: 132 MMHG | HEART RATE: 84 BPM | OXYGEN SATURATION: 96 % | WEIGHT: 315 LBS

## 2023-02-03 DIAGNOSIS — I10 HYPERTENSION, UNSPECIFIED TYPE: ICD-10-CM

## 2023-02-03 DIAGNOSIS — E66.01 MORBID OBESITY: ICD-10-CM

## 2023-02-03 DIAGNOSIS — N18.32 STAGE 3B CHRONIC KIDNEY DISEASE: Primary | ICD-10-CM

## 2023-02-03 PROCEDURE — 99214 OFFICE O/P EST MOD 30 MIN: CPT | Mod: PBBFAC | Performed by: NURSE PRACTITIONER

## 2023-02-03 PROCEDURE — 99215 PR OFFICE/OUTPT VISIT, EST, LEVL V, 40-54 MIN: ICD-10-PCS | Mod: S$PBB,,, | Performed by: NURSE PRACTITIONER

## 2023-02-03 PROCEDURE — 99999 PR PBB SHADOW E&M-EST. PATIENT-LVL IV: ICD-10-PCS | Mod: PBBFAC,,, | Performed by: NURSE PRACTITIONER

## 2023-02-03 PROCEDURE — 99999 PR PBB SHADOW E&M-EST. PATIENT-LVL IV: CPT | Mod: PBBFAC,,, | Performed by: NURSE PRACTITIONER

## 2023-02-03 PROCEDURE — 99215 OFFICE O/P EST HI 40 MIN: CPT | Mod: S$PBB,,, | Performed by: NURSE PRACTITIONER

## 2023-02-03 NOTE — PROGRESS NOTES
Subjective:       Patient ID: Fausto Pandey is a 57 y.o. AA male who presents for follow-up evaluation of   No chief complaint on file.    HPI     Last seen in October 2020.     Patient presents for new evaluation of CKD.  Baseline creatinine difficult to determine due to infrequency of available labs; appears to be 2.5-2.7 mg/dL.      Recently with breast tenderness and was instructed to stop spironolactone.    Home BPs: 120s/80s    Significant hx includes HTN.    Social history: smokes ~4 cigarettes daily; no ETOH     The patient denies taking NSAIDs. Previously said he uses marijuana.     Significant family hx includes: HTN; no known renal disease     Last renal doppler US: November 2020, reviewed.    Update 12/10/21:  Last seen April 2021.  Baseline sCr: 2.5-2.7 mg/dL.  Home BPs: 140s/80s after  Denies NSAID use. Denies recent hospitalizations.  Still smoking cigarettes (no longer daily). Still smoking marijuana.  Plans for inguinal hernia surgery this month; has been having significant groin pain that impairs ability to exercise.    Update 4/8/22:  Returns for f/u of CKD.  Baseline sCr: 2.5-2.7 mg/dL, but recent levels have been in the 2.4 range.  Home BPs: 150/80s at home.  Went to ER with back pain. Says muscle relaxers and patch prescribed are not helping. Said tylenol helped in ER but not helping at home. Unsure if it is a different dose.  Has started walking on the treadmill again, trying to lose weight. Unable to right now due to back pain.    Update 7/25/22:  Returns for f/u of CKD.  Baseline sCr: 2.5-2.7 mg/dL.  Started chlorthalidone last visit.    Home BPs: 130s/80s   Still smoking, but has decreased.  Started exercising again recently.    Denies NSAIDs. Denies hospitalizations.    Update 10/28/22:  Returns for f/u of CKD.  Baseline sCr: 2.5-2.7 mg/dL.  Home BPs: has not been taking    Quit smoking cigarettes on 8/27.  Says he is exercising. Has gym membership.    Update 1/30/23:  Returns for  f/u of CKD.  Baseline sCr: 2.5-2.7 mg/dL.  Home BPs: has not been taking  Walks for exercise. Has cut out fried food. Working on weight loss.    Review of Systems   Respiratory: Negative for shortness of breath.    Cardiovascular: Negative for leg swelling.  Gastrointestinal: Negative for diarrhea, nausea and vomiting.   Genitourinary: Negative for difficulty urinating, dysuria, hematuria.   Musculoskeletal: Occasional low back pain      Objective:       Blood pressure (!) 132/90, pulse 84, weight (!) 160 kg (352 lb 11.8 oz), SpO2 96 %.  Physical Exam  Vitals reviewed.   Constitutional:       General: He is not in acute distress.     Appearance: Normal appearance. He is well-developed. He is obese. He is not ill-appearing or diaphoretic.   Cardiovascular:      Rate and Rhythm: Normal rate.   Pulmonary:      Effort: Pulmonary effort is normal. No respiratory distress.   Abdominal:      Tenderness: There is no right CVA tenderness or left CVA tenderness.   Musculoskeletal:      Cervical back: Neck supple.      Right lower leg: no edema.      Left lower leg: no edema.   Skin:     General: Skin is warm and dry.   Neurological:      Mental Status: He is alert and oriented to person, place, and time.   Psychiatric:         Mood and Affect: Mood normal.         Behavior: Behavior normal.         Thought Content: Thought content normal.         Judgment: Judgment normal.           Lab Results   Component Value Date    CREATININE 2.6 (H) 01/30/2023    URICACID 11.0 (H) 01/30/2023     Prot/Creat Ratio, Urine   Date Value Ref Range Status   01/30/2023 0.12 0.00 - 0.20 Final   10/14/2022 0.12 0.00 - 0.20 Final   07/08/2022 0.11 0.00 - 0.20 Final     Lab Results   Component Value Date     01/30/2023    K 3.8 01/30/2023    CO2 28 01/30/2023     01/30/2023     Lab Results   Component Value Date    .3 (H) 01/30/2023    CALCIUM 9.4 01/30/2023    PHOS 3.7 01/30/2023     Lab Results   Component Value Date    HGB  14.8 01/30/2023    WBC 5.88 01/30/2023    HCT 44.9 01/30/2023      Lab Results   Component Value Date    HGBA1C 4.9 02/15/2022     01/30/2023    BUN 28 (H) 01/30/2023     Lab Results   Component Value Date    LDLCALC 73.6 10/18/2021         Assessment:       1. Stage 3b chronic kidney disease    2. Hypertension, unspecified type    3. Morbid obesity            Plan:   CKD stage 3B c eGFR 29-34 mL/min - clinically 2/2 longstanding poorly controlled HTN.  Stable. Non-proteinuric.  Educated patient to control BP, BG, remain well-hydrated, and avoid NSAIDs to prevent progression of CKD.  Moderate risk of progression to ESRD in the next 5 years.    He has quit smoking cigarettes.    UPCR Previously proteinuric but it improved. On olmesartan   Acid-base WNL   Renal osteodystrophy Ca, phos okay. PTH elevated. On Vitamin D 2000 daily OTC.   Anemia WNL   DM N/a   Lipid Management On statin now. Recommend avoiding fenofibrate.   ESRD planning Anticipatory guidance provided about timing of dialysis. Start discussions and planning when eGFR is about 20 mL/min; most patients start dialysis between 5-10 mL/min.  - Discussed importance of weight loss (obesity), routine prevention in order to be eligible for transplant if he progresses.      HTN -High today on amlodipine 10 mg, chlorthalidone 25 mg, hydralazine 100 mg TID, olmesartan 40 mg, metoprolol succinate 25 mg   - now off Hctz; says back pain has improved off it; also off spironolactone 2/2 breast tenderness  - Monitor home BPs and turn in in 2 weeks    Morbid obesity - Has more aggressively been working on weight loss the last month. He says he gained it over the pandemic when he was sedentary. Planning to start working out in gym. Previously declined offer for referral to bariatrics medicine.    All questions patient had were answered.  Asked if further questions. None. F/u in clinic in 3-4 mos with labs and urine prior to next visit or sooner if needed.  ER for  emergency concerns.     Summary of Plan:  Monitor home BPs x 2 weeks and send in  Continue working on weight loss  avoid NSAID/ bactrim/ IV contrast/ gadolinium/ aminoglycoside where possible  RTC in 3-4 mos    45 minutes of total time spent on the encounter, which includes face to face time and non-face to face time preparing to see the patient (eg, review of tests), Obtaining and/or reviewing separately obtained history, documenting clinical information in the electronic or other health record, independently interpreting results (not separately reported) and communicating results to the patient/family/caregiver, or Care coordination (not separately reported).

## 2023-02-03 NOTE — PATIENT INSTRUCTIONS
Take blood pressures at home for two weeks. Do it twice write it down.  Send a picture of the paper to me on the portal.

## 2023-02-11 ENCOUNTER — LAB VISIT (OUTPATIENT)
Dept: LAB | Facility: HOSPITAL | Age: 58
End: 2023-02-11
Attending: INTERNAL MEDICINE
Payer: MEDICARE

## 2023-02-11 DIAGNOSIS — M10.9 GOUT, UNSPECIFIED CAUSE, UNSPECIFIED CHRONICITY, UNSPECIFIED SITE: ICD-10-CM

## 2023-02-11 LAB
ANION GAP SERPL CALC-SCNC: 11 MMOL/L (ref 8–16)
BUN SERPL-MCNC: 25 MG/DL (ref 6–20)
CALCIUM SERPL-MCNC: 9.4 MG/DL (ref 8.7–10.5)
CHLORIDE SERPL-SCNC: 103 MMOL/L (ref 95–110)
CO2 SERPL-SCNC: 25 MMOL/L (ref 23–29)
CREAT SERPL-MCNC: 2.4 MG/DL (ref 0.5–1.4)
EST. GFR  (NO RACE VARIABLE): 31.1 ML/MIN/1.73 M^2
GLUCOSE SERPL-MCNC: 98 MG/DL (ref 70–110)
POTASSIUM SERPL-SCNC: 4 MMOL/L (ref 3.5–5.1)
SODIUM SERPL-SCNC: 139 MMOL/L (ref 136–145)
URATE SERPL-MCNC: 8.2 MG/DL (ref 3.4–7)

## 2023-02-11 PROCEDURE — 80048 BASIC METABOLIC PNL TOTAL CA: CPT | Performed by: INTERNAL MEDICINE

## 2023-02-11 PROCEDURE — 84550 ASSAY OF BLOOD/URIC ACID: CPT | Performed by: INTERNAL MEDICINE

## 2023-02-11 PROCEDURE — 36415 COLL VENOUS BLD VENIPUNCTURE: CPT | Performed by: INTERNAL MEDICINE

## 2023-02-14 DIAGNOSIS — N18.32 STAGE 3B CHRONIC KIDNEY DISEASE: ICD-10-CM

## 2023-02-14 DIAGNOSIS — M10.9 GOUT, UNSPECIFIED CAUSE, UNSPECIFIED CHRONICITY, UNSPECIFIED SITE: Primary | ICD-10-CM

## 2023-02-14 RX ORDER — ALLOPURINOL 100 MG/1
100 TABLET ORAL DAILY
Qty: 30 TABLET | Refills: 3 | Status: SHIPPED | OUTPATIENT
Start: 2023-02-14 | End: 2023-08-15

## 2023-02-15 ENCOUNTER — OFFICE VISIT (OUTPATIENT)
Dept: INTERNAL MEDICINE | Facility: CLINIC | Age: 58
End: 2023-02-15
Payer: MEDICARE

## 2023-02-15 ENCOUNTER — TELEPHONE (OUTPATIENT)
Dept: INTERNAL MEDICINE | Facility: CLINIC | Age: 58
End: 2023-02-15
Payer: MEDICARE

## 2023-02-15 VITALS
WEIGHT: 315 LBS | DIASTOLIC BLOOD PRESSURE: 80 MMHG | HEART RATE: 72 BPM | OXYGEN SATURATION: 97 % | SYSTOLIC BLOOD PRESSURE: 128 MMHG | HEIGHT: 74 IN | BODY MASS INDEX: 40.43 KG/M2

## 2023-02-15 DIAGNOSIS — E66.01 MORBID OBESITY WITH BMI OF 45.0-49.9, ADULT: ICD-10-CM

## 2023-02-15 DIAGNOSIS — Z87.891 HISTORY OF TOBACCO ABUSE: ICD-10-CM

## 2023-02-15 DIAGNOSIS — I70.0 AORTIC ATHEROSCLEROSIS: ICD-10-CM

## 2023-02-15 DIAGNOSIS — N25.81 SECONDARY HYPERPARATHYROIDISM: Primary | ICD-10-CM

## 2023-02-15 DIAGNOSIS — Z12.5 PROSTATE CANCER SCREENING: ICD-10-CM

## 2023-02-15 DIAGNOSIS — I10 BENIGN ESSENTIAL HTN: ICD-10-CM

## 2023-02-15 DIAGNOSIS — E78.2 MIXED HYPERLIPIDEMIA: ICD-10-CM

## 2023-02-15 DIAGNOSIS — Z00.00 ENCOUNTER FOR ANNUAL PHYSICAL EXAM: ICD-10-CM

## 2023-02-15 DIAGNOSIS — J43.9 PULMONARY EMPHYSEMA, UNSPECIFIED EMPHYSEMA TYPE: ICD-10-CM

## 2023-02-15 DIAGNOSIS — M10.9 GOUT, UNSPECIFIED CAUSE, UNSPECIFIED CHRONICITY, UNSPECIFIED SITE: ICD-10-CM

## 2023-02-15 DIAGNOSIS — N18.32 STAGE 3B CHRONIC KIDNEY DISEASE: ICD-10-CM

## 2023-02-15 DIAGNOSIS — F12.20 MODERATE TETRAHYDROCANNABINOL (THC) DEPENDENCE: ICD-10-CM

## 2023-02-15 PROCEDURE — 99396 PR PREVENTIVE VISIT,EST,40-64: ICD-10-PCS | Mod: S$PBB,GZ,, | Performed by: INTERNAL MEDICINE

## 2023-02-15 PROCEDURE — 99999 PR PBB SHADOW E&M-EST. PATIENT-LVL IV: CPT | Mod: PBBFAC,,, | Performed by: INTERNAL MEDICINE

## 2023-02-15 PROCEDURE — 99214 OFFICE O/P EST MOD 30 MIN: CPT | Mod: PBBFAC | Performed by: INTERNAL MEDICINE

## 2023-02-15 PROCEDURE — 99999 PR PBB SHADOW E&M-EST. PATIENT-LVL IV: ICD-10-PCS | Mod: PBBFAC,,, | Performed by: INTERNAL MEDICINE

## 2023-02-15 PROCEDURE — 99396 PREV VISIT EST AGE 40-64: CPT | Mod: S$PBB,GZ,, | Performed by: INTERNAL MEDICINE

## 2023-02-15 RX ORDER — ATORVASTATIN CALCIUM 80 MG/1
80 TABLET, FILM COATED ORAL DAILY
Qty: 90 TABLET | Refills: 3 | Status: SHIPPED | OUTPATIENT
Start: 2023-02-15

## 2023-02-15 NOTE — TELEPHONE ENCOUNTER
Called and left VM . Patient had a follow up appt with MD today. Medication changes discussed with

## 2023-02-15 NOTE — PROGRESS NOTES
Subjective:       Patient ID: Fausto Pandey is a 57 y.o. male.    Chief Complaint: Follow-up      HPI  Fausto Pandey is a 57 y.o. year old male with morbid obesity, HTN, gout / hyperuricemia, HLD, emphysema, history of tobaco abuse (quit 8/2022), CKD 3b, THC dependence presents for annual exam. Wants to lose 60 pounds in the next year. Still smoking THC daily, discussed CT findings yet again. Recently increasing allopurinol while watching renal function to treat hyperuricemia.     Review of Systems   Constitutional:  Negative for activity change, appetite change, chills, fatigue, fever and unexpected weight change.   HENT:  Negative for congestion, rhinorrhea and sore throat.    Eyes:  Negative for visual disturbance.   Respiratory:  Negative for shortness of breath.    Cardiovascular:  Negative for chest pain.   Gastrointestinal:  Negative for abdominal pain, diarrhea, nausea and vomiting.   Genitourinary:  Negative for difficulty urinating and dysuria.   Musculoskeletal:  Positive for arthralgias. Negative for back pain and myalgias.   Skin:  Negative for color change and rash.   Neurological:  Negative for dizziness, weakness and headaches.       Past Medical History:   Diagnosis Date    Benign essential HTN 8/22/2017    Chronic kidney disease, stage III (moderate) 8/22/2017    Internal derangement of left knee 10/9/2017        Prior to Admission medications    Medication Sig Start Date End Date Taking? Authorizing Provider   acetaminophen (TYLENOL) 500 MG tablet Take 1 tablet (500 mg total) by mouth every 6 (six) hours as needed for Pain. 6/29/19  Yes Shayna Mitchell MD   allopurinoL (ZYLOPRIM) 100 MG tablet Take 1 tablet (100 mg total) by mouth once daily. 2/14/23  Yes Ivan Thornton MD   amLODIPine (NORVASC) 10 MG tablet TAKE 1 TABLET(10 MG) BY MOUTH EVERY DAY 11/24/22  Yes Ivan Thornton MD   atorvastatin (LIPITOR) 80 MG tablet TAKE 1 TABLET(80 MG) BY MOUTH EVERY DAY 11/1/21  Yes Brisa Lynn,  "MD   cetirizine (ZYRTEC) 10 MG tablet Take 1 tablet (10 mg total) by mouth 2 (two) times daily. 12/13/18  Yes Enio Sanchez MD   chlorthalidone (HYGROTEN) 25 MG Tab Take 1 tablet (25 mg total) by mouth once daily. 4/8/22 4/8/23 Yes Roxanna Ibarra NP   cholecalciferol, vitamin D3, (VITAMIN D3) 50 mcg (2,000 unit) Cap capsule Take 1 capsule by mouth once daily.   Yes Historical Provider   colchicine (COLCRYS) 0.6 mg tablet Take 1 tablet (0.6 mg total) by mouth every other day. 11/26/22 2/24/23 Yes Ivan Thornton MD   hydrALAZINE (APRESOLINE) 100 MG tablet Take 1 tablet (100 mg total) by mouth every 8 (eight) hours. 2/15/22 2/15/23 Yes Ivan Thornton MD   methocarbamoL (ROBAXIN) 750 MG Tab Take 1 tablet (750 mg total) by mouth 3 (three) times daily as needed (pain). 4/28/22  Yes Ivan Thornton MD   metoprolol succinate (TOPROL-XL) 25 MG 24 hr tablet TAKE 1 TABLET(25 MG) BY MOUTH EVERY DAY 5/16/22  Yes Roxanna Ibarra NP   olmesartan (BENICAR) 40 MG tablet TAKE 1 TABLET(40 MG) BY MOUTH EVERY DAY 5/16/22  Yes Roxanna Ibarra NP   predniSONE (DELTASONE) 20 MG tablet Take 1 tablet (20 mg total) by mouth 2 (two) times daily. 11/21/22  Yes vIan Thornton MD   traMADoL (ULTRAM) 50 mg tablet Take 1-2 tablets ( mg total) by mouth nightly as needed for Pain. 11/12/22  Yes Ivan Thornton MD   LIDOcaine (LIDODERM) 5 % Place 1 patch onto the skin once daily. Remove & Discard patch within 12 hours or as directed by MD  Patient not taking: Reported on 10/28/2022 4/6/22   Valencia Salcedo PA-C        Past medical history, surgical history, and family medical history reviewed and updated as appropriate.    Medications and allergies reviewed.     Objective:          Vitals:    02/15/23 0921   BP: 128/80   BP Location: Right arm   Patient Position: Sitting   Pulse: 72   SpO2: 97%   Weight: (!) 163.6 kg (360 lb 10.8 oz)   Height: 6' 2" (1.88 m)     Body mass index is 46.31 kg/m².  Physical Exam  Constitutional:       General: " He is not in acute distress.     Appearance: He is well-developed.   HENT:      Head: Normocephalic and atraumatic.      Nose: Nose normal.   Eyes:      General: No scleral icterus.     Extraocular Movements: Extraocular movements intact.   Neck:      Thyroid: No thyromegaly.      Vascular: No JVD.      Trachea: No tracheal deviation.   Cardiovascular:      Rate and Rhythm: Normal rate and regular rhythm.      Heart sounds: Normal heart sounds. No murmur heard.    No friction rub. No gallop.   Pulmonary:      Effort: Pulmonary effort is normal. No respiratory distress.      Breath sounds: Normal breath sounds. No wheezing or rales.   Abdominal:      General: Bowel sounds are normal. There is no distension.      Palpations: Abdomen is soft. There is no mass.      Tenderness: There is no abdominal tenderness.   Musculoskeletal:         General: No tenderness. Normal range of motion.      Cervical back: Normal range of motion and neck supple.   Lymphadenopathy:      Cervical: No cervical adenopathy.   Skin:     General: Skin is warm and dry.      Findings: No rash.   Neurological:      Mental Status: He is alert and oriented to person, place, and time.      Cranial Nerves: No cranial nerve deficit.      Deep Tendon Reflexes: Reflexes normal.   Psychiatric:         Behavior: Behavior normal.       Lab Results   Component Value Date    WBC 5.88 01/30/2023    HGB 14.8 01/30/2023    HCT 44.9 01/30/2023     01/30/2023    CHOL 130 10/18/2021    TRIG 142 10/18/2021    HDL 28 (L) 10/18/2021    ALT 22 10/18/2021    AST 27 10/18/2021     02/11/2023    K 4.0 02/11/2023     02/11/2023    CREATININE 2.4 (H) 02/11/2023    BUN 25 (H) 02/11/2023    CO2 25 02/11/2023    TSH 1.709 10/20/2020    HGBA1C 4.9 02/15/2022       Assessment:       1. Secondary hyperparathyroidism    2. Encounter for annual physical exam    3. Pulmonary emphysema, unspecified emphysema type    4. Aortic atherosclerosis    5. Stage 3b chronic  kidney disease    6. Benign essential HTN    7. Mixed hyperlipidemia    8. Gout, unspecified cause, unspecified chronicity, unspecified site    9. History of tobacco abuse    10. Prostate cancer screening    11. Morbid obesity with BMI of 45.0-49.9, adult    12. Moderate tetrahydrocannabinol (THC) dependence          Plan:     Fausto was seen today for follow-up.    Diagnoses and all orders for this visit:    Secondary hyperparathyroidism    Encounter for annual physical exam    Pulmonary emphysema, unspecified emphysema type  Comments:  asymptomatic, apical; seen on LDCT lung screening. patient in denial, does not wish to quit smoking THC    Aortic atherosclerosis  Comments:  on statin; bp controlled, no changes to current management    Stage 3b chronic kidney disease  Comments:  follows with nephrology; 2' hyperparathyroidism. renally dose medications, avoid nephrotoxics    Benign essential HTN  Comments:  controlled, no changes to current medications.     Mixed hyperlipidemia  Comments:  refilled lipitor 80, previously on this medication, recheck lipid panel with next set of labs  Orders:  -     Lipid Panel; Future  -     atorvastatin (LIPITOR) 80 MG tablet; Take 1 tablet (80 mg total) by mouth once daily.    Gout, unspecified cause, unspecified chronicity, unspecified site  Comments:  recently increased allopurinol from 50 mg daily to 100 mg daily. uric acid not at goal    History of tobacco abuse  Comments:  quit 8/22/2022, on his wife's birthday    Prostate cancer screening  Comments:  psa ordered after informed decision making.  Orders:  -     PSA, Screening; Future    Morbid obesity with BMI of 45.0-49.9, adult  Comments:  discussed lifestyle changes; pt is attempting to lose weight    Moderate tetrahydrocannabinol (THC) dependence  Comments:  discussed at [facility], pt not interested at quitting at this time.     The 10-year ASCVD risk score (Valeria CHAUDHRY, et al., 2019) is: 19%    Values used to calculate the  score:      Age: 57 years      Sex: Male      Is Non- : Yes      Diabetic: No      Tobacco smoker: Yes      Systolic Blood Pressure: 128 mmHg      Is BP treated: Yes      HDL Cholesterol: 28 mg/dL      Total Cholesterol: 130 mg/dL      Health maintenance reviewed with patient.    Follow up in about 6 months (around 8/15/2023).    Ivan Thornton MD  Internal Medicine / Primary Care  [facility]  2/15/2023

## 2023-02-15 NOTE — PATIENT INSTRUCTIONS
When you go for labs on 3/31/2023 - don't eat anything in the morning. Water is ok . Take your medications as normal.    Increase allopurinol to 100 mg daily as discussed.     Restart atorvastatin 80 mg daily.     Return to clinic in 6 months or sooner if needed.

## 2023-03-31 ENCOUNTER — LAB VISIT (OUTPATIENT)
Dept: LAB | Facility: OTHER | Age: 58
End: 2023-03-31
Attending: NURSE PRACTITIONER
Payer: MEDICARE

## 2023-03-31 ENCOUNTER — TELEPHONE (OUTPATIENT)
Dept: INTERNAL MEDICINE | Facility: CLINIC | Age: 58
End: 2023-03-31
Payer: MEDICARE

## 2023-03-31 DIAGNOSIS — N18.32 STAGE 3B CHRONIC KIDNEY DISEASE: ICD-10-CM

## 2023-03-31 DIAGNOSIS — Z12.5 PROSTATE CANCER SCREENING: ICD-10-CM

## 2023-03-31 DIAGNOSIS — E78.2 MIXED HYPERLIPIDEMIA: ICD-10-CM

## 2023-03-31 LAB
25(OH)D3+25(OH)D2 SERPL-MCNC: 63 NG/ML (ref 30–96)
ALBUMIN SERPL BCP-MCNC: 3.6 G/DL (ref 3.5–5.2)
ANION GAP SERPL CALC-SCNC: 9 MMOL/L (ref 8–16)
BASOPHILS # BLD AUTO: 0.03 K/UL (ref 0–0.2)
BASOPHILS NFR BLD: 0.5 % (ref 0–1.9)
BUN SERPL-MCNC: 28 MG/DL (ref 6–20)
CALCIUM SERPL-MCNC: 9.3 MG/DL (ref 8.7–10.5)
CHLORIDE SERPL-SCNC: 110 MMOL/L (ref 95–110)
CHOLEST SERPL-MCNC: 212 MG/DL (ref 120–199)
CHOLEST/HDLC SERPL: 8.5 {RATIO} (ref 2–5)
CO2 SERPL-SCNC: 24 MMOL/L (ref 23–29)
COMPLEXED PSA SERPL-MCNC: 0.85 NG/ML (ref 0–4)
CREAT SERPL-MCNC: 2.6 MG/DL (ref 0.5–1.4)
DIFFERENTIAL METHOD: ABNORMAL
EOSINOPHIL # BLD AUTO: 0.4 K/UL (ref 0–0.5)
EOSINOPHIL NFR BLD: 6.7 % (ref 0–8)
ERYTHROCYTE [DISTWIDTH] IN BLOOD BY AUTOMATED COUNT: 13 % (ref 11.5–14.5)
EST. GFR  (NO RACE VARIABLE): 28 ML/MIN/1.73 M^2
GLUCOSE SERPL-MCNC: 101 MG/DL (ref 70–110)
HCT VFR BLD AUTO: 41.1 % (ref 40–54)
HDLC SERPL-MCNC: 25 MG/DL (ref 40–75)
HDLC SERPL: 11.8 % (ref 20–50)
HGB BLD-MCNC: 13.4 G/DL (ref 14–18)
IMM GRANULOCYTES # BLD AUTO: 0.01 K/UL (ref 0–0.04)
IMM GRANULOCYTES NFR BLD AUTO: 0.2 % (ref 0–0.5)
LDLC SERPL CALC-MCNC: 124.2 MG/DL (ref 63–159)
LYMPHOCYTES # BLD AUTO: 3 K/UL (ref 1–4.8)
LYMPHOCYTES NFR BLD: 49.1 % (ref 18–48)
MCH RBC QN AUTO: 33.3 PG (ref 27–31)
MCHC RBC AUTO-ENTMCNC: 32.6 G/DL (ref 32–36)
MCV RBC AUTO: 102 FL (ref 82–98)
MONOCYTES # BLD AUTO: 0.6 K/UL (ref 0.3–1)
MONOCYTES NFR BLD: 10 % (ref 4–15)
NEUTROPHILS # BLD AUTO: 2.1 K/UL (ref 1.8–7.7)
NEUTROPHILS NFR BLD: 33.5 % (ref 38–73)
NONHDLC SERPL-MCNC: 187 MG/DL
NRBC BLD-RTO: 0 /100 WBC
PHOSPHATE SERPL-MCNC: 4 MG/DL (ref 2.7–4.5)
PLATELET # BLD AUTO: 226 K/UL (ref 150–450)
PMV BLD AUTO: 10.6 FL (ref 9.2–12.9)
POTASSIUM SERPL-SCNC: 4.2 MMOL/L (ref 3.5–5.1)
PTH-INTACT SERPL-MCNC: 139.6 PG/ML (ref 9–77)
RBC # BLD AUTO: 4.03 M/UL (ref 4.6–6.2)
SODIUM SERPL-SCNC: 143 MMOL/L (ref 136–145)
TRIGL SERPL-MCNC: 314 MG/DL (ref 30–150)
WBC # BLD AUTO: 6.13 K/UL (ref 3.9–12.7)

## 2023-03-31 PROCEDURE — 84153 ASSAY OF PSA TOTAL: CPT | Performed by: INTERNAL MEDICINE

## 2023-03-31 PROCEDURE — 85025 COMPLETE CBC W/AUTO DIFF WBC: CPT | Performed by: NURSE PRACTITIONER

## 2023-03-31 PROCEDURE — 82306 VITAMIN D 25 HYDROXY: CPT | Performed by: NURSE PRACTITIONER

## 2023-03-31 PROCEDURE — 36415 COLL VENOUS BLD VENIPUNCTURE: CPT | Performed by: NURSE PRACTITIONER

## 2023-03-31 PROCEDURE — 80069 RENAL FUNCTION PANEL: CPT | Performed by: NURSE PRACTITIONER

## 2023-03-31 PROCEDURE — 83970 ASSAY OF PARATHORMONE: CPT | Performed by: NURSE PRACTITIONER

## 2023-03-31 PROCEDURE — 80061 LIPID PANEL: CPT | Performed by: INTERNAL MEDICINE

## 2023-03-31 NOTE — TELEPHONE ENCOUNTER
----- Message from Ivan Thornton MD sent at 3/31/2023  3:05 PM CDT -----  Please ask patient if he has been taking his lipitor faithfully. His cholesterol levels appear to be higher than they were previously.

## 2023-04-05 ENCOUNTER — OFFICE VISIT (OUTPATIENT)
Dept: NEPHROLOGY | Facility: CLINIC | Age: 58
End: 2023-04-05
Payer: MEDICARE

## 2023-04-05 VITALS
WEIGHT: 315 LBS | OXYGEN SATURATION: 95 % | HEIGHT: 74 IN | BODY MASS INDEX: 40.43 KG/M2 | DIASTOLIC BLOOD PRESSURE: 98 MMHG | HEART RATE: 76 BPM | SYSTOLIC BLOOD PRESSURE: 158 MMHG

## 2023-04-05 DIAGNOSIS — N25.81 SECONDARY HYPERPARATHYROIDISM: ICD-10-CM

## 2023-04-05 DIAGNOSIS — E66.01 MORBID OBESITY: ICD-10-CM

## 2023-04-05 DIAGNOSIS — I10 HYPERTENSION, UNSPECIFIED TYPE: Primary | ICD-10-CM

## 2023-04-05 DIAGNOSIS — N18.32 STAGE 3B CHRONIC KIDNEY DISEASE: ICD-10-CM

## 2023-04-05 PROCEDURE — 99999 PR PBB SHADOW E&M-EST. PATIENT-LVL IV: CPT | Mod: PBBFAC,,, | Performed by: NURSE PRACTITIONER

## 2023-04-05 PROCEDURE — 99214 OFFICE O/P EST MOD 30 MIN: CPT | Mod: PBBFAC | Performed by: NURSE PRACTITIONER

## 2023-04-05 PROCEDURE — 99999 PR PBB SHADOW E&M-EST. PATIENT-LVL IV: ICD-10-PCS | Mod: PBBFAC,,, | Performed by: NURSE PRACTITIONER

## 2023-04-05 PROCEDURE — 99214 PR OFFICE/OUTPT VISIT, EST, LEVL IV, 30-39 MIN: ICD-10-PCS | Mod: S$PBB,,, | Performed by: NURSE PRACTITIONER

## 2023-04-05 PROCEDURE — 99214 OFFICE O/P EST MOD 30 MIN: CPT | Mod: S$PBB,,, | Performed by: NURSE PRACTITIONER

## 2023-04-05 RX ORDER — NIFEDIPINE 90 MG/1
90 TABLET, EXTENDED RELEASE ORAL DAILY
Qty: 90 TABLET | Refills: 3 | Status: SHIPPED | OUTPATIENT
Start: 2023-04-05 | End: 2024-04-04

## 2023-04-05 NOTE — PATIENT INSTRUCTIONS
Keep taking blood pressures at home.  If you see that your top number is less than 110 frequently let us know. That's too low.     Stop amlodipine.  Start nifedipine.  Let me know if you are having weakness, fatigue, dizziness.

## 2023-04-05 NOTE — PROGRESS NOTES
Subjective:       Patient ID: Fausto Pandey is a 57 y.o. AA male who presents for follow-up evaluation of   No chief complaint on file.    HPI     Last seen in October 2020.     Patient presents for new evaluation of CKD.  Baseline creatinine difficult to determine due to infrequency of available labs; appears to be 2.5-2.7 mg/dL.      Recently with breast tenderness and was instructed to stop spironolactone.    Home BPs: 120s/80s    Significant hx includes HTN.    Social history: smokes ~4 cigarettes daily; no ETOH     The patient denies taking NSAIDs. Previously said he uses marijuana.     Significant family hx includes: HTN; no known renal disease     Last renal doppler US: November 2020, reviewed.    Update 12/10/21:  Last seen April 2021.  Baseline sCr: 2.5-2.7 mg/dL.  Home BPs: 140s/80s after  Denies NSAID use. Denies recent hospitalizations.  Still smoking cigarettes (no longer daily). Still smoking marijuana.  Plans for inguinal hernia surgery this month; has been having significant groin pain that impairs ability to exercise.    Update 4/8/22:  Returns for f/u of CKD.  Baseline sCr: 2.5-2.7 mg/dL, but recent levels have been in the 2.4 range.  Home BPs: 150/80s at home.  Went to ER with back pain. Says muscle relaxers and patch prescribed are not helping. Said tylenol helped in ER but not helping at home. Unsure if it is a different dose.  Has started walking on the treadmill again, trying to lose weight. Unable to right now due to back pain.    Update 7/25/22:  Returns for f/u of CKD.  Baseline sCr: 2.5-2.7 mg/dL.  Started chlorthalidone last visit.    Home BPs: 130s/80s   Still smoking, but has decreased.  Started exercising again recently.    Denies NSAIDs. Denies hospitalizations.    Update 10/28/22:  Returns for f/u of CKD.  Baseline sCr: 2.5-2.7 mg/dL.  Home BPs: has not been taking    Quit smoking cigarettes on 8/27.  Says he is exercising. Has gym membership.    Update 1/30/23:  Returns for  "f/u of CKD.  Baseline sCr: 2.5-2.7 mg/dL.  Home BPs: has not been taking  Walks for exercise. Has cut out fried food. Working on weight loss.    Update 4/5/23:  Returns for f/u of CKD.  Baseline sCr: 2.5-2.7 mg/dL.  Home BPs: 150s/90s (lots of stress recently); prior to stress was 130s/90s  Still walking for exercise. Working on diet.    Review of Systems   Respiratory: Negative for shortness of breath.    Cardiovascular: Negative for leg swelling (sometimes gets it a little when he's been on his feet all day).  Gastrointestinal: Negative for diarrhea, nausea and vomiting.   Genitourinary: Negative for difficulty urinating, dysuria, hematuria.   Musculoskeletal: Denies low back pain      Objective:       Blood pressure (!) 158/98, pulse 76, height 6' 2" (1.88 m), weight (!) 162.4 kg (358 lb 0.4 oz), SpO2 95 %.  Physical Exam  Vitals reviewed.   Constitutional:       General: He is not in acute distress.     Appearance: Normal appearance. He is well-developed. He is obese. He is not ill-appearing or diaphoretic.   Cardiovascular:      Rate and Rhythm: Normal rate.   Pulmonary:      Effort: Pulmonary effort is normal. No respiratory distress.   Abdominal:      Tenderness: There is no right CVA tenderness or left CVA tenderness.   Musculoskeletal:      Cervical back: Neck supple.      Right lower leg: no edema.      Left lower leg: no edema.   Skin:     General: Skin is warm and dry.   Neurological:      Mental Status: He is alert and oriented to person, place, and time.   Psychiatric:         Mood and Affect: Mood normal.         Behavior: Behavior normal.         Thought Content: Thought content normal.         Judgment: Judgment normal.           Lab Results   Component Value Date    CREATININE 2.6 (H) 03/31/2023    URICACID 8.2 (H) 02/11/2023     Prot/Creat Ratio, Urine   Date Value Ref Range Status   03/31/2023 0.14 0.00 - 0.20 Final   01/30/2023 0.12 0.00 - 0.20 Final   10/14/2022 0.12 0.00 - 0.20 Final     Lab " Results   Component Value Date     03/31/2023    K 4.2 03/31/2023    CO2 24 03/31/2023     03/31/2023     Lab Results   Component Value Date    .6 (H) 03/31/2023    CALCIUM 9.3 03/31/2023    PHOS 4.0 03/31/2023     Lab Results   Component Value Date    HGB 13.4 (L) 03/31/2023    WBC 6.13 03/31/2023    HCT 41.1 03/31/2023      Lab Results   Component Value Date    HGBA1C 4.9 02/15/2022     03/31/2023    BUN 28 (H) 03/31/2023     Lab Results   Component Value Date    LDLCALC 124.2 03/31/2023         Assessment:       1. Hypertension, unspecified type    2. Stage 3b chronic kidney disease    3. Secondary hyperparathyroidism    4. Morbid obesity              Plan:   CKD stage 3B c eGFR 29-34 mL/min - clinically 2/2 longstanding poorly controlled HTN.  Stable. Non-proteinuric.  Educated patient to control BP, BG, remain well-hydrated, and avoid NSAIDs to prevent progression of CKD.  Moderate risk of progression to ESRD in the next 5 years.    He has quit smoking cigarettes.    UPCR Previously proteinuric but it improved. On olmesartan   Acid-base WNL   Renal osteodystrophy Ca, phos okay. PTH elevated. On Vitamin D 2000 daily OTC.   Anemia WNL   DM N/a   Lipid Management On statin now. Recommend avoiding fenofibrate.   ESRD planning Anticipatory guidance provided about timing of dialysis. Start discussions and planning when eGFR is about 20 mL/min; most patients start dialysis between 5-10 mL/min.  - Discussed importance of weight loss (obesity), routine prevention in order to be eligible for transplant if he progresses.      HTN -High today and has been high at home recently on amlodipine 10 mg, chlorthalidone 25 mg, hydralazine 100 mg TID, olmesartan 40 mg, metoprolol succinate 25 mg   - now off Hctz; says back pain has improved off it; also off spironolactone 2/2 breast tenderness  - Change amlodipine to nifedipine. Keep monitoring home BPs. Report if SBP is less than 110.    Morbid obesity -  Has more aggressively been working on weight loss the last month. He says he gained it over the pandemic when he was sedentary. Planning to start working out in gym. Previously declined offer for referral to bariatrics medicine.    All questions patient had were answered.  Asked if further questions. None. F/u in clinic in 4-5 mos with labs and urine prior to next visit or sooner if needed.  ER for emergency concerns.     Summary of Plan:  Switch amlodipine to nifedipine 90 mg  avoid NSAID/ bactrim/ IV contrast/ gadolinium/ aminoglycoside where possible  RTC in 4-5 mos    Note: Pt advised that I will be on maternity leave over the Summer and who will be covering for me.

## 2023-06-27 ENCOUNTER — HOSPITAL ENCOUNTER (EMERGENCY)
Facility: OTHER | Age: 58
Discharge: HOME | End: 2023-06-27
Attending: EMERGENCY MEDICINE
Payer: MEDICARE

## 2023-06-27 VITALS
WEIGHT: 315 LBS | SYSTOLIC BLOOD PRESSURE: 157 MMHG | DIASTOLIC BLOOD PRESSURE: 101 MMHG | HEART RATE: 68 BPM | RESPIRATION RATE: 18 BRPM | OXYGEN SATURATION: 97 % | TEMPERATURE: 98 F | BODY MASS INDEX: 45.58 KG/M2

## 2023-06-27 DIAGNOSIS — M54.50 ACUTE RIGHT-SIDED LOW BACK PAIN WITHOUT SCIATICA: Primary | ICD-10-CM

## 2023-06-27 LAB
ALBUMIN SERPL BCP-MCNC: 3.6 G/DL (ref 3.5–5.2)
ALP SERPL-CCNC: 57 U/L (ref 55–135)
ALT SERPL W/O P-5'-P-CCNC: 22 U/L (ref 10–44)
ANION GAP SERPL CALC-SCNC: 9 MMOL/L (ref 8–16)
AST SERPL-CCNC: 21 U/L (ref 10–40)
BACTERIA #/AREA URNS HPF: NORMAL /HPF
BASOPHILS # BLD AUTO: 0.04 K/UL (ref 0–0.2)
BASOPHILS NFR BLD: 0.6 % (ref 0–1.9)
BILIRUB SERPL-MCNC: 0.4 MG/DL (ref 0.1–1)
BILIRUB UR QL STRIP: NEGATIVE
BUN SERPL-MCNC: 28 MG/DL (ref 6–20)
CALCIUM SERPL-MCNC: 9.2 MG/DL (ref 8.7–10.5)
CHLORIDE SERPL-SCNC: 105 MMOL/L (ref 95–110)
CLARITY UR: CLEAR
CO2 SERPL-SCNC: 27 MMOL/L (ref 23–29)
COLOR UR: YELLOW
CREAT SERPL-MCNC: 2.5 MG/DL (ref 0.5–1.4)
DIFFERENTIAL METHOD: ABNORMAL
EOSINOPHIL # BLD AUTO: 0.4 K/UL (ref 0–0.5)
EOSINOPHIL NFR BLD: 6.1 % (ref 0–8)
ERYTHROCYTE [DISTWIDTH] IN BLOOD BY AUTOMATED COUNT: 12.9 % (ref 11.5–14.5)
EST. GFR  (NO RACE VARIABLE): 30 ML/MIN/1.73 M^2
GLUCOSE SERPL-MCNC: 107 MG/DL (ref 70–110)
GLUCOSE UR QL STRIP: NEGATIVE
HCT VFR BLD AUTO: 41.1 % (ref 40–54)
HGB BLD-MCNC: 13.7 G/DL (ref 14–18)
HGB UR QL STRIP: NEGATIVE
IMM GRANULOCYTES # BLD AUTO: 0.01 K/UL (ref 0–0.04)
IMM GRANULOCYTES NFR BLD AUTO: 0.1 % (ref 0–0.5)
KETONES UR QL STRIP: NEGATIVE
LEUKOCYTE ESTERASE UR QL STRIP: ABNORMAL
LYMPHOCYTES # BLD AUTO: 3.6 K/UL (ref 1–4.8)
LYMPHOCYTES NFR BLD: 52.1 % (ref 18–48)
MCH RBC QN AUTO: 34.1 PG (ref 27–31)
MCHC RBC AUTO-ENTMCNC: 33.3 G/DL (ref 32–36)
MCV RBC AUTO: 102 FL (ref 82–98)
MICROSCOPIC COMMENT: NORMAL
MONOCYTES # BLD AUTO: 0.6 K/UL (ref 0.3–1)
MONOCYTES NFR BLD: 9.2 % (ref 4–15)
NEUTROPHILS # BLD AUTO: 2.2 K/UL (ref 1.8–7.7)
NEUTROPHILS NFR BLD: 31.9 % (ref 38–73)
NITRITE UR QL STRIP: NEGATIVE
NRBC BLD-RTO: 0 /100 WBC
PH UR STRIP: 5 [PH] (ref 5–8)
PLATELET # BLD AUTO: 225 K/UL (ref 150–450)
PMV BLD AUTO: 10 FL (ref 9.2–12.9)
POTASSIUM SERPL-SCNC: 4 MMOL/L (ref 3.5–5.1)
PROT SERPL-MCNC: 7 G/DL (ref 6–8.4)
PROT UR QL STRIP: NEGATIVE
RBC # BLD AUTO: 4.02 M/UL (ref 4.6–6.2)
RBC #/AREA URNS HPF: 1 /HPF (ref 0–4)
SODIUM SERPL-SCNC: 141 MMOL/L (ref 136–145)
SP GR UR STRIP: 1.01 (ref 1–1.03)
SQUAMOUS #/AREA URNS HPF: 2 /HPF
URN SPEC COLLECT METH UR: ABNORMAL
UROBILINOGEN UR STRIP-ACNC: NEGATIVE EU/DL
WBC # BLD AUTO: 6.85 K/UL (ref 3.9–12.7)
WBC #/AREA URNS HPF: 5 /HPF (ref 0–5)

## 2023-06-27 PROCEDURE — 80053 COMPREHEN METABOLIC PANEL: CPT | Performed by: PHYSICIAN ASSISTANT

## 2023-06-27 PROCEDURE — 25000003 PHARM REV CODE 250

## 2023-06-27 PROCEDURE — 99284 EMERGENCY DEPT VISIT MOD MDM: CPT | Mod: 25

## 2023-06-27 PROCEDURE — 81000 URINALYSIS NONAUTO W/SCOPE: CPT | Performed by: PHYSICIAN ASSISTANT

## 2023-06-27 PROCEDURE — 85025 COMPLETE CBC W/AUTO DIFF WBC: CPT | Performed by: PHYSICIAN ASSISTANT

## 2023-06-27 RX ORDER — TIZANIDINE 2 MG/1
4 TABLET ORAL EVERY 8 HOURS PRN
Qty: 30 TABLET | Refills: 0 | Status: SHIPPED | OUTPATIENT
Start: 2023-06-27 | End: 2023-07-07

## 2023-06-27 RX ORDER — ORPHENADRINE CITRATE 100 MG/1
100 TABLET, EXTENDED RELEASE ORAL
Status: COMPLETED | OUTPATIENT
Start: 2023-06-27 | End: 2023-06-27

## 2023-06-27 RX ORDER — HYDROCODONE BITARTRATE AND ACETAMINOPHEN 10; 325 MG/1; MG/1
1 TABLET ORAL
Status: COMPLETED | OUTPATIENT
Start: 2023-06-27 | End: 2023-06-27

## 2023-06-27 RX ORDER — TRAMADOL HYDROCHLORIDE 50 MG/1
50 TABLET ORAL EVERY 6 HOURS PRN
Qty: 12 TABLET | Refills: 0 | Status: SHIPPED | OUTPATIENT
Start: 2023-06-27 | End: 2023-06-30

## 2023-06-27 RX ADMIN — ORPHENADRINE CITRATE 100 MG: 100 TABLET, EXTENDED RELEASE ORAL at 08:06

## 2023-06-27 RX ADMIN — HYDROCODONE BITARTRATE AND ACETAMINOPHEN 1 TABLET: 10; 325 TABLET ORAL at 08:06

## 2023-06-28 DIAGNOSIS — I10 BENIGN ESSENTIAL HTN: ICD-10-CM

## 2023-06-28 RX ORDER — OLMESARTAN MEDOXOMIL 40 MG/1
TABLET ORAL
Qty: 90 TABLET | Refills: 3 | Status: SHIPPED | OUTPATIENT
Start: 2023-06-28

## 2023-06-28 NOTE — FIRST PROVIDER EVALUATION
Emergency Department TeleTriage Encounter Note      CHIEF COMPLAINT    Chief Complaint   Patient presents with    Flank Pain     Right flank pain x 1 week.        VITAL SIGNS   Initial Vitals [06/27/23 1859]   BP Pulse Resp Temp SpO2   (!) 162/99 78 18 98 °F (36.7 °C) 97 %      MAP       --            ALLERGIES    Review of patient's allergies indicates:   Allergen Reactions    Spironolactone Other (See Comments)     Breast tenderness    Ibuprofen Other (See Comments)     Unable to take Ibuprofen due to decreased kidney function       PROVIDER TRIAGE NOTE  This is a teletriage evaluation of a 57 y.o. male presenting to the ED complaining of flank pain. Patient reports right flank pain intermittently for one week. He denies urinary complaints. He denies injury.    Patient is alert and oriented. He speaks in complete sentences. He is sitting upright in the chair in no distress.     Initial orders will be placed and care will be transferred to an alternate provider when patient is roomed for a full evaluation. Any additional orders and the final disposition will be determined by that provider.         ORDERS  Labs Reviewed   CBC W/ AUTO DIFFERENTIAL   COMPREHENSIVE METABOLIC PANEL   URINALYSIS, REFLEX TO URINE CULTURE       ED Orders (720h ago, onward)      Start Ordered     Status Ordering Provider    06/27/23 1926 06/27/23 1926  Saline lock IV  Once         Ordered RENAN, ANNA G.    06/27/23 1926 06/27/23 1926  CBC auto differential  STAT         Ordered RENAN, ANNA G.    06/27/23 1926 06/27/23 1926  Comprehensive metabolic panel  STAT         Ordered RENAN, ANNA G.    06/27/23 1926 06/27/23 1926  Urinalysis, Reflex to Urine Culture Urine, Clean Catch  STAT         Ordered RENAN, ANNA G.    06/27/23 1926 06/27/23 1926  CT Renal Stone Study ABD Pelvis WO  1 time imaging         Ordered RENAN, ANNA G.              Virtual Visit Note: The provider triage portion of this emergency department evaluation and  documentation was performed via [employer], a HIPAA-compliant telemedicine application, in concert with a tele-presenter in the room. A face to face patient evaluation with one of my colleagues will occur once the patient is placed in an emergency department room.      DISCLAIMER: This note was prepared with [employer] voice recognition transcription software. Garbled syntax, mangled pronouns, and other bizarre constructions may be attributed to that software system.

## 2023-06-28 NOTE — ED PROVIDER NOTES
Encounter Date: 6/27/2023       History     Chief Complaint   Patient presents with    Flank Pain     Right flank pain x 1 week.      57-year-old male presents to the emergency department for evaluation of right lower back pain for 1 week.  He denies recent falls or trauma to his back.  No recent exercise or strenuous activity.  Reports pain is worse with movement.  States that he feels a pulling sensation whenever he lays on his right side.  He denies fever, chills, abdominal pain, nausea, vomiting, diarrhea, urinary symptoms.  He denies urinary retention, bladder or bowel incontinence, or saddle anesthesia.  No relief of pain with Tylenol.  Denies history of back injury or surgery.     The history is provided by the patient.   Review of patient's allergies indicates:   Allergen Reactions    Spironolactone Other (See Comments)     Breast tenderness    Ibuprofen Other (See Comments)     Unable to take Ibuprofen due to decreased kidney function     Past Medical History:   Diagnosis Date    Benign essential HTN 8/22/2017    Chronic kidney disease, stage III (moderate) 8/22/2017    Internal derangement of left knee 10/9/2017     No past surgical history on file.  Family History   Problem Relation Age of Onset    Hypertension Mother     Hypertension Father     Hypertension Brother      Social History     Tobacco Use    Smoking status: Every Day     Packs/day: 0.30     Types: Cigarettes    Smokeless tobacco: Never   Substance Use Topics    Alcohol use: Never    Drug use: Yes     Types: Marijuana     Comment: daily     Review of Systems   Constitutional:  Negative for chills and fever.   HENT:  Negative for congestion, rhinorrhea and sore throat.    Respiratory:  Negative for cough and shortness of breath.    Cardiovascular:  Negative for chest pain.   Gastrointestinal:  Negative for abdominal pain, diarrhea, nausea and vomiting.   Genitourinary:  Negative for dysuria, frequency and urgency.   Musculoskeletal:  Positive  for back pain (right lower).   Skin:  Negative for rash.   Neurological:  Negative for dizziness and headaches.   Psychiatric/Behavioral:  Negative for confusion.      Physical Exam     Initial Vitals [06/27/23 1859]   BP Pulse Resp Temp SpO2   (!) 162/99 78 18 98 °F (36.7 °C) 97 %      MAP       --         Physical Exam    Nursing note and vitals reviewed.  Constitutional: He appears well-developed and well-nourished. No distress.   Morbidly obese male.   HENT:   Head: Normocephalic and atraumatic.   Eyes: Conjunctivae and EOM are normal.   Neck: Neck supple.   Normal range of motion.  Cardiovascular:  Normal rate, regular rhythm, normal heart sounds and intact distal pulses.           Pulmonary/Chest: Breath sounds normal. No respiratory distress. He has no wheezes. He has no rhonchi. He has no rales.   Musculoskeletal:         General: No edema. Normal range of motion.      Cervical back: Normal range of motion and neck supple.      Comments: No C, T, L midline tenderness to palpation, crepitus, step-offs, or deformities.  Right lumbar paraspinal tenderness to palpation.  Negative straight leg raise.  No CVA tenderness to palpation.     Neurological: He is alert and oriented to person, place, and time. He has normal strength. No cranial nerve deficit or sensory deficit.   Strength 5/5 in bilateral lower extremities.  Sensation intact to light touch.  Neurovascularly intact.   Skin: Skin is warm and dry. No rash noted.   Psychiatric: He has a normal mood and affect. His behavior is normal. Judgment and thought content normal.       ED Course   Procedures  Labs Reviewed   CBC W/ AUTO DIFFERENTIAL - Abnormal; Notable for the following components:       Result Value    RBC 4.02 (*)     Hemoglobin 13.7 (*)      (*)     MCH 34.1 (*)     Gran % 31.9 (*)     Lymph % 52.1 (*)     All other components within normal limits   COMPREHENSIVE METABOLIC PANEL - Abnormal; Notable for the following components:    BUN 28 (*)      Creatinine 2.5 (*)     eGFR 30 (*)     All other components within normal limits   URINALYSIS, REFLEX TO URINE CULTURE - Abnormal; Notable for the following components:    Leukocytes, UA 1+ (*)     All other components within normal limits    Narrative:     Specimen Source->Urine   URINALYSIS MICROSCOPIC    Narrative:     Specimen Source->Urine          Imaging Results              CT Renal Stone Study ABD Pelvis WO (Final result)  Result time 06/27/23 19:59:55      Final result by Star Michael MD (06/27/23 19:59:55)                   Impression:      1. No acute intra-abdominal abnormalities identified.  No evidence of renal stones or obstructive uropathy.  2. Stable small 8 mm left renal hyperdense lesion seen at the superior pole.  Similar appearance was seen on chest CT from August 2022.  Findings could represent small hyperdense or possible hemorrhagic cyst.  Future nonemergent ultrasound follow-up may be obtained to ensure cystic nature of this lesion.  3. Additional findings as detailed above.      Electronically signed by: Star Michael MD  Date:    06/27/2023  Time:    19:59               Narrative:    EXAMINATION:  CT RENAL STONE STUDY ABD PELVIS WO    CLINICAL HISTORY:  Flank pain, kidney stone suspected;    TECHNIQUE:  Low dose axial images, sagittal and coronal reformations were obtained from the lung bases to the pubic symphysis.  Contrast was not administered.    COMPARISON:  CT chest from August 2022.    FINDINGS:  The visualized portion of the heart is unremarkable.  The lung bases are clear.    No significant hepatic abnormality seen on this noncontrast exam.  There is no intra-or extrahepatic biliary ductal dilatation.  The gallbladder is unremarkable.  The stomach, pancreas, spleen, and right adrenal gland are unremarkable.  Stable small left adrenal nodule is seen with imaging characteristics consistent with adrenal adenoma.    Kidneys show no evidence of stones or hydronephrosis.   Small 8 mm left renal hyperdense lesion is seen at the superior pole.  Small suspected subcentimeter right renal cyst is also noted.  Ureters are unremarkable along their courses.  Urinary bladder is nondistended.  Prostate is unremarkable.    Appendix is visualized and is unremarkable.  The visualized loops of small and large bowel show no evidence of obstruction or inflammation.  No free air or free fluid.    Aorta tapers normally.    No acute osseous abnormality identified.  There is lower lumbar DJD.  Degenerative endplate change and sclerosis are seen of the L4 inferior endplate.  Pronounced intervertebral disc space narrowing is seen at L5-S1.  Small metallic BB is seen within the subcutaneous soft tissues at the lateral aspect of the left lower chest wall.                                       Medications   HYDROcodone-acetaminophen  mg per tablet 1 tablet (1 tablet Oral Given 6/27/23 2027)   orphenadrine 12 hr tablet 100 mg (100 mg Oral Given 6/27/23 2027)     Medical Decision Making:   Initial Assessment:   Urgent evaluation of 57-year-old male who presents with atraumatic right lower back pain for 1 week.  No fever, chills, urinary symptoms, or symptoms of cauda equina.  On exam, he has no midline tenderness to palpation of the spine.  There is right lumbar paraspinal tenderness to palpation.  Likely lumbar strain.  Patient is allergic to NSAIDs.  Will give Norco 10 and muscle relaxer.  ED Management:  On reassessment, patient feeling better after receiving medications.  Will discharge home with tramadol and Zanaflex.  Advised to alternate ice and heating packs.  He verbalized understanding and agreement with this plan of care.  He was given specific return precautions.  All questions and concerns addressed.  He is stable for discharge.                        Clinical Impression:   Final diagnoses:  [M54.50] Acute right-sided low back pain without sciatica (Primary)        ED Disposition Condition     Discharge Stable          ED Prescriptions       Medication Sig Dispense Start Date End Date Auth. Provider    traMADoL (ULTRAM) 50 mg tablet Take 1 tablet (50 mg total) by mouth every 6 (six) hours as needed for Pain. 12 tablet 6/27/2023 6/30/2023 Jorge Pappas PA-C    tiZANidine (ZANAFLEX) 2 MG tablet Take 2 tablets (4 mg total) by mouth every 8 (eight) hours as needed (for spasms). 30 tablet 6/27/2023 7/7/2023 Jorge Pappas PA-C          Follow-up Information       Follow up With Specialties Details Why Contact Info    Ivan Thornton MD Internal Medicine  As needed, If symptoms worsen [address]  [place] [address]  [phone]      [facility] Emergency Dept Emergency Medicine  As needed, If symptoms worsen [address] [facility] [address] [phone]             Jorge Pappas PA-C  06/28/23 1455

## 2023-06-28 NOTE — ED TRIAGE NOTES
Right sided back pain x 1 week with no h/o injury. States pain worse with movement and unrelieved with Tylenol. Denies urinary symptoms, fever, N/V. Presents in no distress.

## 2023-07-26 DIAGNOSIS — I10 BENIGN ESSENTIAL HTN: ICD-10-CM

## 2023-07-26 RX ORDER — METOPROLOL SUCCINATE 25 MG/1
25 TABLET, EXTENDED RELEASE ORAL DAILY
Qty: 90 TABLET | Refills: 3 | Status: SHIPPED | OUTPATIENT
Start: 2023-07-26

## 2023-08-15 ENCOUNTER — OFFICE VISIT (OUTPATIENT)
Dept: INTERNAL MEDICINE | Facility: CLINIC | Age: 58
End: 2023-08-15
Payer: MEDICARE

## 2023-08-15 VITALS
HEIGHT: 74 IN | BODY MASS INDEX: 40.43 KG/M2 | DIASTOLIC BLOOD PRESSURE: 80 MMHG | WEIGHT: 315 LBS | HEART RATE: 70 BPM | OXYGEN SATURATION: 98 % | SYSTOLIC BLOOD PRESSURE: 134 MMHG

## 2023-08-15 DIAGNOSIS — Z09 FOLLOW-UP EXAM: Primary | ICD-10-CM

## 2023-08-15 DIAGNOSIS — M10.9 GOUT, UNSPECIFIED CAUSE, UNSPECIFIED CHRONICITY, UNSPECIFIED SITE: ICD-10-CM

## 2023-08-15 DIAGNOSIS — Z87.891 HISTORY OF TOBACCO ABUSE: ICD-10-CM

## 2023-08-15 DIAGNOSIS — E66.01 MORBID OBESITY WITH BMI OF 45.0-49.9, ADULT: ICD-10-CM

## 2023-08-15 DIAGNOSIS — E79.0 HYPERURICEMIA: ICD-10-CM

## 2023-08-15 DIAGNOSIS — I10 BENIGN ESSENTIAL HTN: ICD-10-CM

## 2023-08-15 DIAGNOSIS — E78.2 MIXED HYPERLIPIDEMIA: ICD-10-CM

## 2023-08-15 DIAGNOSIS — N18.32 STAGE 3B CHRONIC KIDNEY DISEASE: ICD-10-CM

## 2023-08-15 DIAGNOSIS — M65.332 TRIGGER MIDDLE FINGER OF LEFT HAND: ICD-10-CM

## 2023-08-15 DIAGNOSIS — I70.0 AORTIC ATHEROSCLEROSIS: ICD-10-CM

## 2023-08-15 PROCEDURE — 99214 PR OFFICE/OUTPT VISIT, EST, LEVL IV, 30-39 MIN: ICD-10-PCS | Mod: S$PBB,,, | Performed by: INTERNAL MEDICINE

## 2023-08-15 PROCEDURE — 99999 PR PBB SHADOW E&M-EST. PATIENT-LVL IV: ICD-10-PCS | Mod: PBBFAC,,, | Performed by: INTERNAL MEDICINE

## 2023-08-15 PROCEDURE — 99214 OFFICE O/P EST MOD 30 MIN: CPT | Mod: PBBFAC | Performed by: INTERNAL MEDICINE

## 2023-08-15 PROCEDURE — 99214 OFFICE O/P EST MOD 30 MIN: CPT | Mod: S$PBB,,, | Performed by: INTERNAL MEDICINE

## 2023-08-15 PROCEDURE — 99999 PR PBB SHADOW E&M-EST. PATIENT-LVL IV: CPT | Mod: PBBFAC,,, | Performed by: INTERNAL MEDICINE

## 2023-08-15 RX ORDER — HYDRALAZINE HYDROCHLORIDE 100 MG/1
100 TABLET, FILM COATED ORAL EVERY 8 HOURS
Qty: 270 TABLET | Refills: 3 | Status: SHIPPED | OUTPATIENT
Start: 2023-08-15 | End: 2024-08-14

## 2023-08-15 RX ORDER — METHYLPREDNISOLONE 4 MG/1
TABLET ORAL
Qty: 21 EACH | Refills: 0 | Status: SHIPPED | OUTPATIENT
Start: 2023-08-15 | End: 2023-09-05

## 2023-08-15 RX ORDER — ALLOPURINOL 100 MG/1
150 TABLET ORAL DAILY
Qty: 135 TABLET | Refills: 3 | Status: SHIPPED | OUTPATIENT
Start: 2023-08-15

## 2023-08-15 RX ORDER — COLCHICINE 0.6 MG/1
0.6 TABLET ORAL EVERY OTHER DAY
Qty: 15 TABLET | Refills: 2 | Status: SHIPPED | OUTPATIENT
Start: 2023-08-15 | End: 2024-02-23

## 2023-08-15 NOTE — PROGRESS NOTES
Subjective:       Patient ID: Fausto Pandey is a 57 y.o. male.    Chief Complaint: Follow-up      HPI  Fausto Pandey is a 57 y.o. year old male with HTN, HLD, CKD 3b, hyperuricemia, hx of tobacco abuse (quit smoking 8/2022) presents for follow up. Since last visit, ER visit for lower back muscle spasm.     Review of Systems   Constitutional:  Negative for activity change, appetite change, chills, fatigue, fever and unexpected weight change.   HENT:  Negative for congestion, rhinorrhea and sore throat.    Eyes:  Negative for visual disturbance.   Respiratory:  Negative for shortness of breath.    Cardiovascular:  Negative for chest pain.   Gastrointestinal:  Negative for abdominal pain, diarrhea, nausea and vomiting.   Genitourinary:  Negative for difficulty urinating and dysuria.   Musculoskeletal:  Positive for arthralgias. Negative for back pain and myalgias.        Left trigger middle finger   Skin:  Negative for color change and rash.   Neurological:  Negative for dizziness, weakness and headaches.         Past Medical History:   Diagnosis Date    Benign essential HTN 8/22/2017    Chronic kidney disease, stage III (moderate) 8/22/2017    Internal derangement of left knee 10/9/2017        Prior to Admission medications    Medication Sig Start Date End Date Taking? Authorizing Provider   acetaminophen (TYLENOL) 500 MG tablet Take 1 tablet (500 mg total) by mouth every 6 (six) hours as needed for Pain. 6/29/19  Yes Shayna Mitchell MD   allopurinoL (ZYLOPRIM) 100 MG tablet Take 1 tablet (100 mg total) by mouth once daily. 2/14/23  Yes Ivan Thornton MD   atorvastatin (LIPITOR) 80 MG tablet Take 1 tablet (80 mg total) by mouth once daily. 2/15/23  Yes Ivan Thornton MD   cetirizine (ZYRTEC) 10 MG tablet Take 1 tablet (10 mg total) by mouth 2 (two) times daily. 12/13/18  Yes Enio Sanchez MD   cholecalciferol, vitamin D3, (VITAMIN D3) 50 mcg (2,000 unit) Cap capsule Take 1 capsule by mouth once daily.    "Yes Provider, Historical   methocarbamoL (ROBAXIN) 750 MG Tab Take 1 tablet (750 mg total) by mouth 3 (three) times daily as needed (pain). 4/28/22  Yes Ivan Thornton MD   metoprolol succinate (TOPROL-XL) 25 MG 24 hr tablet Take 1 tablet (25 mg total) by mouth once daily. 7/26/23  Yes Ana M Pollard NP   olmesartan (BENICAR) 40 MG tablet TAKE 1 TABLET(40 MG) BY MOUTH EVERY DAY 6/28/23  Yes Ana M Pollard NP   traMADoL (ULTRAM) 50 mg tablet Take 1-2 tablets ( mg total) by mouth nightly as needed for Pain. 11/12/22  Yes Ivan Thornton MD   chlorthalidone (HYGROTEN) 25 MG Tab Take 1 tablet (25 mg total) by mouth once daily. 4/8/22 4/8/23  Roxanna Ibarra NP   colchicine (COLCRYS) 0.6 mg tablet Take 1 tablet (0.6 mg total) by mouth every other day. 11/26/22 4/5/23  Ivan Thornton MD   hydrALAZINE (APRESOLINE) 100 MG tablet Take 1 tablet (100 mg total) by mouth every 8 (eight) hours. 2/15/22 4/5/23  Ivan Thornton MD   LIDOcaine (LIDODERM) 5 % Place 1 patch onto the skin once daily. Remove & Discard patch within 12 hours or as directed by MD  Patient not taking: Reported on 8/15/2023 4/6/22   Valencia Salcedo PA-C   NIFEdipine (PROCARDIA-XL) 90 MG (OSM) 24 hr tablet Take 1 tablet (90 mg total) by mouth once daily.  Patient not taking: Reported on 8/15/2023 4/5/23 4/4/24  Roxanna Ibarra NP   predniSONE (DELTASONE) 20 MG tablet Take 1 tablet (20 mg total) by mouth 2 (two) times daily.  Patient not taking: Reported on 8/15/2023 11/21/22   Ivan Thornton MD        Past medical history, surgical history, and family medical history reviewed and updated as appropriate.    Medications and allergies reviewed.     Objective:          Vitals:    08/15/23 0913   BP: 134/80   BP Location: Right arm   Patient Position: Sitting   Pulse: 70   SpO2: 98%   Weight: (!) 164.1 kg (361 lb 12.4 oz)   Height: 6' 2" (1.88 m)     Body mass index is 46.45 kg/m².  Physical Exam  Constitutional:       General: He is not in acute " distress.     Appearance: He is well-developed. He is obese.   HENT:      Head: Normocephalic and atraumatic.      Nose: Nose normal.   Eyes:      General: No scleral icterus.     Extraocular Movements: Extraocular movements intact.   Neck:      Thyroid: No thyromegaly.      Vascular: No JVD.      Trachea: No tracheal deviation.   Cardiovascular:      Rate and Rhythm: Normal rate and regular rhythm.      Heart sounds: Normal heart sounds. No murmur heard.     No friction rub. No gallop.   Pulmonary:      Effort: Pulmonary effort is normal. No respiratory distress.      Breath sounds: Normal breath sounds. No wheezing or rales.   Abdominal:      General: Bowel sounds are normal. There is no distension.      Palpations: Abdomen is soft. There is no mass.      Tenderness: There is no abdominal tenderness.   Musculoskeletal:         General: No tenderness. Normal range of motion.      Cervical back: Normal range of motion and neck supple.   Lymphadenopathy:      Cervical: No cervical adenopathy.   Skin:     General: Skin is warm and dry.      Findings: No rash.   Neurological:      Mental Status: He is alert and oriented to person, place, and time.      Cranial Nerves: No cranial nerve deficit.      Deep Tendon Reflexes: Reflexes normal.   Psychiatric:         Behavior: Behavior normal.         Lab Results   Component Value Date    WBC 6.85 06/27/2023    HGB 13.7 (L) 06/27/2023    HCT 41.1 06/27/2023     06/27/2023    CHOL 212 (H) 03/31/2023    TRIG 314 (H) 03/31/2023    HDL 25 (L) 03/31/2023    ALT 22 06/27/2023    AST 21 06/27/2023     06/27/2023    K 4.0 06/27/2023     06/27/2023    CREATININE 2.5 (H) 06/27/2023    BUN 28 (H) 06/27/2023    CO2 27 06/27/2023    TSH 1.709 10/20/2020    PSA 0.85 03/31/2023    HGBA1C 4.9 02/15/2022       Assessment:       1. Follow-up exam    2. Benign essential HTN    3. Mixed hyperlipidemia    4. Aortic atherosclerosis    5. Trigger middle finger of left hand    6.  Hyperuricemia    7. Gout, unspecified cause, unspecified chronicity, unspecified site    8. Stage 3b chronic kidney disease    9. History of tobacco abuse    10. Morbid obesity with BMI of 45.0-49.9, adult    11. Benign essential HTN          Plan:     Fausto was seen today for follow-up.    Diagnoses and all orders for this visit:    Follow-up exam    Benign essential HTN  -     hydrALAZINE (APRESOLINE) 100 MG tablet; Take 1 tablet (100 mg total) by mouth every 8 (eight) hours.    Mixed hyperlipidemia    Aortic atherosclerosis    Trigger middle finger of left hand  Comments:  worse in mornings, on going for several weeks.   Orders:  -     methylPREDNISolone (MEDROL DOSEPACK) 4 mg tablet; use as directed    Hyperuricemia  -     Cancel: URIC ACID; Future  -     URIC ACID; Future    Gout, unspecified cause, unspecified chronicity, unspecified site  -     allopurinoL (ZYLOPRIM) 100 MG tablet; Take 1.5 tablets (150 mg total) by mouth once daily.  -     colchicine, gout, (COLCRYS) 0.6 mg tablet; Take 1 tablet (0.6 mg total) by mouth every other day.    Stage 3b chronic kidney disease  -     BASIC METABOLIC PANEL; Future    History of tobacco abuse  -     CT Chest Lung Screening Low Dose; Future    Morbid obesity with BMI of 45.0-49.9, adult  Comments:  declines bariatric referral. wants to continue with lifestyle changes    Benign essential HTN  Comments:  uncontrolled, worsening, BP logs, nurse visit 2 weeks, increase hydralazine to 100 mg TID  Orders:  -     hydrALAZINE (APRESOLINE) 100 MG tablet; Take 1 tablet (100 mg total) by mouth every 8 (eight) hours.        Health maintenance reviewed with patient.     Follow up in about 3 months (around 11/15/2023).    Ivan Thornton MD  Internal Medicine / Primary Care  [facility]  8/15/2023

## 2023-08-15 NOTE — PATIENT INSTRUCTIONS
Trigger finger splint at night time  Start medrol dose pack for arthritis pain.    Labwork in 4 weeks.    Return to clinic in 3 months or sooner if needed. Bring all your medications with you.

## 2023-08-18 ENCOUNTER — TELEPHONE (OUTPATIENT)
Dept: PULMONOLOGY | Facility: CLINIC | Age: 58
End: 2023-08-18
Payer: MEDICARE

## 2023-08-28 ENCOUNTER — PATIENT MESSAGE (OUTPATIENT)
Dept: INTERNAL MEDICINE | Facility: CLINIC | Age: 58
End: 2023-08-28
Payer: MEDICARE

## 2023-09-05 ENCOUNTER — LAB VISIT (OUTPATIENT)
Dept: LAB | Facility: OTHER | Age: 58
End: 2023-09-05
Attending: NURSE PRACTITIONER
Payer: MEDICARE

## 2023-09-05 DIAGNOSIS — N18.32 STAGE 3B CHRONIC KIDNEY DISEASE: ICD-10-CM

## 2023-09-05 DIAGNOSIS — I10 HYPERTENSION, UNSPECIFIED TYPE: ICD-10-CM

## 2023-09-05 DIAGNOSIS — E79.0 HYPERURICEMIA: ICD-10-CM

## 2023-09-05 LAB
ALBUMIN SERPL BCP-MCNC: 3.3 G/DL (ref 3.5–5.2)
ANION GAP SERPL CALC-SCNC: 9 MMOL/L (ref 8–16)
ANION GAP SERPL CALC-SCNC: 9 MMOL/L (ref 8–16)
BASOPHILS # BLD AUTO: 0.03 K/UL (ref 0–0.2)
BASOPHILS NFR BLD: 0.5 % (ref 0–1.9)
BUN SERPL-MCNC: 26 MG/DL (ref 6–20)
BUN SERPL-MCNC: 26 MG/DL (ref 6–20)
CALCIUM SERPL-MCNC: 9.3 MG/DL (ref 8.7–10.5)
CALCIUM SERPL-MCNC: 9.3 MG/DL (ref 8.7–10.5)
CHLORIDE SERPL-SCNC: 105 MMOL/L (ref 95–110)
CHLORIDE SERPL-SCNC: 105 MMOL/L (ref 95–110)
CO2 SERPL-SCNC: 26 MMOL/L (ref 23–29)
CO2 SERPL-SCNC: 26 MMOL/L (ref 23–29)
CREAT SERPL-MCNC: 2.5 MG/DL (ref 0.5–1.4)
CREAT SERPL-MCNC: 2.5 MG/DL (ref 0.5–1.4)
DIFFERENTIAL METHOD: ABNORMAL
EOSINOPHIL # BLD AUTO: 0.5 K/UL (ref 0–0.5)
EOSINOPHIL NFR BLD: 7.8 % (ref 0–8)
ERYTHROCYTE [DISTWIDTH] IN BLOOD BY AUTOMATED COUNT: 13.1 % (ref 11.5–14.5)
EST. GFR  (NO RACE VARIABLE): 30 ML/MIN/1.73 M^2
EST. GFR  (NO RACE VARIABLE): 30 ML/MIN/1.73 M^2
GLUCOSE SERPL-MCNC: 124 MG/DL (ref 70–110)
GLUCOSE SERPL-MCNC: 124 MG/DL (ref 70–110)
HCT VFR BLD AUTO: 40.9 % (ref 40–54)
HGB BLD-MCNC: 13.6 G/DL (ref 14–18)
IMM GRANULOCYTES # BLD AUTO: 0.01 K/UL (ref 0–0.04)
IMM GRANULOCYTES NFR BLD AUTO: 0.2 % (ref 0–0.5)
LYMPHOCYTES # BLD AUTO: 2.9 K/UL (ref 1–4.8)
LYMPHOCYTES NFR BLD: 46.6 % (ref 18–48)
MCH RBC QN AUTO: 33.7 PG (ref 27–31)
MCHC RBC AUTO-ENTMCNC: 33.3 G/DL (ref 32–36)
MCV RBC AUTO: 101 FL (ref 82–98)
MONOCYTES # BLD AUTO: 0.5 K/UL (ref 0.3–1)
MONOCYTES NFR BLD: 8.1 % (ref 4–15)
NEUTROPHILS # BLD AUTO: 2.3 K/UL (ref 1.8–7.7)
NEUTROPHILS NFR BLD: 36.8 % (ref 38–73)
NRBC BLD-RTO: 0 /100 WBC
PHOSPHATE SERPL-MCNC: 3.8 MG/DL (ref 2.7–4.5)
PLATELET # BLD AUTO: 220 K/UL (ref 150–450)
PMV BLD AUTO: 11 FL (ref 9.2–12.9)
POTASSIUM SERPL-SCNC: 3.8 MMOL/L (ref 3.5–5.1)
POTASSIUM SERPL-SCNC: 3.8 MMOL/L (ref 3.5–5.1)
PTH-INTACT SERPL-MCNC: 103.1 PG/ML (ref 9–77)
RBC # BLD AUTO: 4.04 M/UL (ref 4.6–6.2)
SODIUM SERPL-SCNC: 140 MMOL/L (ref 136–145)
SODIUM SERPL-SCNC: 140 MMOL/L (ref 136–145)
URATE SERPL-MCNC: 6.3 MG/DL (ref 3.4–7)
WBC # BLD AUTO: 6.14 K/UL (ref 3.9–12.7)

## 2023-09-05 PROCEDURE — 84550 ASSAY OF BLOOD/URIC ACID: CPT | Performed by: INTERNAL MEDICINE

## 2023-09-05 PROCEDURE — 83970 ASSAY OF PARATHORMONE: CPT | Performed by: NURSE PRACTITIONER

## 2023-09-05 PROCEDURE — 80069 RENAL FUNCTION PANEL: CPT | Performed by: NURSE PRACTITIONER

## 2023-09-05 PROCEDURE — 85025 COMPLETE CBC W/AUTO DIFF WBC: CPT | Performed by: NURSE PRACTITIONER

## 2023-09-05 PROCEDURE — 36415 COLL VENOUS BLD VENIPUNCTURE: CPT | Performed by: NURSE PRACTITIONER

## 2023-11-16 ENCOUNTER — IMMUNIZATION (OUTPATIENT)
Dept: INTERNAL MEDICINE | Facility: CLINIC | Age: 58
End: 2023-11-16
Payer: MEDICARE

## 2023-11-16 ENCOUNTER — OFFICE VISIT (OUTPATIENT)
Dept: INTERNAL MEDICINE | Facility: CLINIC | Age: 58
End: 2023-11-16
Payer: MEDICARE

## 2023-11-16 VITALS
RESPIRATION RATE: 18 BRPM | TEMPERATURE: 99 F | HEIGHT: 74 IN | WEIGHT: 315 LBS | HEART RATE: 85 BPM | DIASTOLIC BLOOD PRESSURE: 90 MMHG | OXYGEN SATURATION: 94 % | BODY MASS INDEX: 40.43 KG/M2 | SYSTOLIC BLOOD PRESSURE: 146 MMHG

## 2023-11-16 DIAGNOSIS — N18.32 STAGE 3B CHRONIC KIDNEY DISEASE: ICD-10-CM

## 2023-11-16 DIAGNOSIS — Z87.891 HISTORY OF TOBACCO ABUSE: ICD-10-CM

## 2023-11-16 DIAGNOSIS — Z23 NEED FOR VACCINATION: Primary | ICD-10-CM

## 2023-11-16 DIAGNOSIS — I10 BENIGN ESSENTIAL HTN: ICD-10-CM

## 2023-11-16 DIAGNOSIS — Z12.2 SCREENING FOR LUNG CANCER: ICD-10-CM

## 2023-11-16 DIAGNOSIS — J43.9 PULMONARY EMPHYSEMA, UNSPECIFIED EMPHYSEMA TYPE: ICD-10-CM

## 2023-11-16 DIAGNOSIS — M65.332 TRIGGER MIDDLE FINGER OF LEFT HAND: ICD-10-CM

## 2023-11-16 DIAGNOSIS — Z09 FOLLOW-UP EXAM: Primary | ICD-10-CM

## 2023-11-16 PROCEDURE — 99999 PR PBB SHADOW E&M-EST. PATIENT-LVL V: CPT | Mod: PBBFAC,,, | Performed by: INTERNAL MEDICINE

## 2023-11-16 PROCEDURE — 99215 OFFICE O/P EST HI 40 MIN: CPT | Mod: PBBFAC | Performed by: INTERNAL MEDICINE

## 2023-11-16 PROCEDURE — 91322 SARSCOV2 VAC 50 MCG/0.5ML IM: CPT | Mod: PBBFAC

## 2023-11-16 PROCEDURE — 99999 PR PBB SHADOW E&M-EST. PATIENT-LVL V: ICD-10-PCS | Mod: PBBFAC,,, | Performed by: INTERNAL MEDICINE

## 2023-11-16 PROCEDURE — 99999PBSHW COVID-19 VAC, MRNA 2023 (MODERNA)(PF) 50 MCG/0.5 ML IM SUSR (12+): ICD-10-PCS | Mod: PBBFAC,,,

## 2023-11-16 PROCEDURE — 99214 PR OFFICE/OUTPT VISIT, EST, LEVL IV, 30-39 MIN: ICD-10-PCS | Mod: S$PBB,,, | Performed by: INTERNAL MEDICINE

## 2023-11-16 PROCEDURE — 99999PBSHW COVID-19 VAC, MRNA 2023 (MODERNA)(PF) 50 MCG/0.5 ML IM SUSR (12+): Mod: PBBFAC,,,

## 2023-11-16 PROCEDURE — 99214 OFFICE O/P EST MOD 30 MIN: CPT | Mod: S$PBB,,, | Performed by: INTERNAL MEDICINE

## 2023-11-16 RX ORDER — AMLODIPINE BESYLATE 10 MG/1
10 TABLET ORAL
COMMUNITY
Start: 2023-10-18

## 2023-11-16 RX ORDER — CHLORTHALIDONE 25 MG/1
25 TABLET ORAL DAILY
Qty: 90 TABLET | Refills: 3 | Status: SHIPPED | OUTPATIENT
Start: 2023-11-16 | End: 2024-11-15

## 2023-11-16 NOTE — PATIENT INSTRUCTIONS
X-ray of left hand  Schedule orthopedics appointment  Schedule low dose CT for lung cancer screening.  Stop by immunization center for your covid-19 booster    Refill of chlorthalidone sent to your pharmacy    Return to clinic in 6 months

## 2023-11-16 NOTE — PROGRESS NOTES
Subjective:       Patient ID: Fausto Pandey is a 58 y.o. male.    Chief Complaint: Follow-up (3 mon f/u - pain in finger still (8))      HPI  Fausto Pandey is a 58 y.o. year old male with HTN, CKD 3b, gout presents for persistent L middle trigger finger. States medrol dose pack did not help much, would like to see orthopedics. Chlorthalidone prescription , patient states he is still taking -- unsure if he is given the prescription  in April of this year.     Review of Systems   Constitutional:  Negative for activity change, appetite change, chills, fatigue, fever and unexpected weight change.   HENT:  Negative for congestion, rhinorrhea and sore throat.    Eyes:  Negative for visual disturbance.   Respiratory:  Negative for shortness of breath.    Cardiovascular:  Negative for chest pain.   Gastrointestinal:  Negative for abdominal pain, diarrhea, nausea and vomiting.   Genitourinary:  Negative for difficulty urinating and dysuria.   Musculoskeletal:  Positive for arthralgias. Negative for back pain and myalgias.   Skin:  Negative for color change and rash.   Neurological:  Negative for dizziness, weakness and headaches.         Past Medical History:   Diagnosis Date    Benign essential HTN 2017    Chronic kidney disease, stage III (moderate) 2017    Internal derangement of left knee 10/9/2017        Prior to Admission medications    Medication Sig Start Date End Date Taking? Authorizing Provider   acetaminophen (TYLENOL) 500 MG tablet Take 1 tablet (500 mg total) by mouth every 6 (six) hours as needed for Pain. 19  Yes Shayna Mitchell MD   allopurinoL (ZYLOPRIM) 100 MG tablet Take 1.5 tablets (150 mg total) by mouth once daily. 8/15/23  Yes Ivan Thornton MD   amLODIPine (NORVASC) 10 MG tablet Take 10 mg by mouth. 10/18/23  Yes Provider, Historical   atorvastatin (LIPITOR) 80 MG tablet Take 1 tablet (80 mg total) by mouth once daily. 2/15/23  Yes Ivan Thornton MD  "  cetirizine (ZYRTEC) 10 MG tablet Take 1 tablet (10 mg total) by mouth 2 (two) times daily. 12/13/18  Yes Enio Sanchez MD   cholecalciferol, vitamin D3, (VITAMIN D3) 50 mcg (2,000 unit) Cap capsule Take 1 capsule by mouth once daily.   Yes Provider, Enrique   hydrALAZINE (APRESOLINE) 100 MG tablet Take 1 tablet (100 mg total) by mouth every 8 (eight) hours. 8/15/23 8/14/24 Yes Ivan Thornton MD   methocarbamoL (ROBAXIN) 750 MG Tab Take 1 tablet (750 mg total) by mouth 3 (three) times daily as needed (pain). 4/28/22  Yes Ivan Thornton MD   metoprolol succinate (TOPROL-XL) 25 MG 24 hr tablet Take 1 tablet (25 mg total) by mouth once daily. 7/26/23  Yes Ana M Pollard NP   NIFEdipine (PROCARDIA-XL) 90 MG (OSM) 24 hr tablet Take 1 tablet (90 mg total) by mouth once daily. 4/5/23 4/4/24 Yes Roxanna Ibarra, NP   olmesartan (BENICAR) 40 MG tablet TAKE 1 TABLET(40 MG) BY MOUTH EVERY DAY 6/28/23  Yes Ana M Pollard NP   traMADoL (ULTRAM) 50 mg tablet Take 1-2 tablets ( mg total) by mouth nightly as needed for Pain. 11/12/22  Yes Ivan Thornton MD   chlorthalidone (HYGROTEN) 25 MG Tab Take 1 tablet (25 mg total) by mouth once daily. 11/16/23 11/15/24  Ivan Thornton MD   colchicine, gout, (COLCRYS) 0.6 mg tablet Take 1 tablet (0.6 mg total) by mouth every other day. 8/15/23 11/13/23  Ivan Thornton MD   chlorthalidone (HYGROTEN) 25 MG Tab Take 1 tablet (25 mg total) by mouth once daily. 4/8/22 11/16/23  Roxanna Ibarra, NIRU        Past medical history, surgical history, and family medical history reviewed and updated as appropriate.    Medications and allergies reviewed.     Objective:          Vitals:    11/16/23 1126 11/16/23 1153   BP: (!) 152/92 (!) 146/90   BP Location: Right arm    Patient Position: Sitting    BP Method: Large (Manual)    Pulse: 85    Resp: 18    Temp: 98.6 °F (37 °C)    TempSrc: Oral    SpO2: (!) 94%    Weight: (!) 166.9 kg (367 lb 15.2 oz)    Height: 6' 2" (1.88 m)      Body mass " index is 47.24 kg/m².  Physical Exam  Constitutional:       General: He is not in acute distress.     Appearance: He is well-developed. He is obese.   HENT:      Head: Normocephalic and atraumatic.      Nose: Nose normal.   Eyes:      General: No scleral icterus.     Extraocular Movements: Extraocular movements intact.   Neck:      Thyroid: No thyromegaly.      Vascular: No JVD.      Trachea: No tracheal deviation.   Cardiovascular:      Rate and Rhythm: Normal rate and regular rhythm.      Heart sounds: Normal heart sounds. No murmur heard.     No friction rub. No gallop.   Pulmonary:      Effort: Pulmonary effort is normal. No respiratory distress.      Breath sounds: Normal breath sounds. No wheezing or rales.      Comments: Diminished breath sounds across all lung fields  Abdominal:      General: Bowel sounds are normal. There is no distension.      Palpations: Abdomen is soft. There is no mass.      Tenderness: There is no abdominal tenderness.   Musculoskeletal:         General: No tenderness. Normal range of motion.      Cervical back: Normal range of motion and neck supple.      Comments: Limited ROM right middle finger with flexion at MCP joint. States if he tries to go any further it will get stuck.   Lymphadenopathy:      Cervical: No cervical adenopathy.   Skin:     General: Skin is warm and dry.      Findings: No rash.   Neurological:      Mental Status: He is alert and oriented to person, place, and time.      Cranial Nerves: No cranial nerve deficit.      Deep Tendon Reflexes: Reflexes normal.   Psychiatric:         Behavior: Behavior normal.         Lab Results   Component Value Date    WBC 6.14 09/05/2023    HGB 13.6 (L) 09/05/2023    HCT 40.9 09/05/2023     09/05/2023    CHOL 212 (H) 03/31/2023    TRIG 314 (H) 03/31/2023    HDL 25 (L) 03/31/2023    ALT 22 06/27/2023    AST 21 06/27/2023     09/05/2023     09/05/2023    K 3.8 09/05/2023    K 3.8 09/05/2023     09/05/2023      09/05/2023    CREATININE 2.5 (H) 09/05/2023    CREATININE 2.5 (H) 09/05/2023    BUN 26 (H) 09/05/2023    BUN 26 (H) 09/05/2023    CO2 26 09/05/2023    CO2 26 09/05/2023    TSH 1.709 10/20/2020    PSA 0.85 03/31/2023    HGBA1C 4.9 02/15/2022       Assessment:       1. Follow-up exam    2. Trigger middle finger of left hand    3. Benign essential HTN    4. History of tobacco abuse    5. Pulmonary emphysema, unspecified emphysema type    6. Stage 3b chronic kidney disease    7. Screening for lung cancer          Plan:     Fausto was seen today for follow-up.    Diagnoses and all orders for this visit:    Follow-up exam    Trigger middle finger of left hand  -     Ambulatory referral/consult to Orthopedics; Future  -     X-Ray Hand 3 View Left; Future    Benign essential HTN  -     chlorthalidone (HYGROTEN) 25 MG Tab; Take 1 tablet (25 mg total) by mouth once daily.    History of tobacco abuse  Comments:  quit 1 year ago, smoker for 40 years 1/2 ppd  Orders:  -     CT Chest Lung Screening Low Dose; Future    Pulmonary emphysema, unspecified emphysema type  -     CT Chest Lung Screening Low Dose; Future    Stage 3b chronic kidney disease    Screening for lung cancer  -     CT Chest Lung Screening Low Dose; Future        Health maintenance reviewed with patient. Declines flu shot. Covid-19 booster today.    Follow up in about 6 months (around 5/16/2024).    Ivan Thornton MD  Internal Medicine / Primary Care  [facility]  11/16/2023

## 2023-11-17 ENCOUNTER — HOSPITAL ENCOUNTER (OUTPATIENT)
Dept: RADIOLOGY | Facility: HOSPITAL | Age: 58
Discharge: HOME | End: 2023-11-17
Attending: INTERNAL MEDICINE
Payer: MEDICARE

## 2023-11-17 DIAGNOSIS — J43.9 PULMONARY EMPHYSEMA, UNSPECIFIED EMPHYSEMA TYPE: ICD-10-CM

## 2023-11-17 DIAGNOSIS — Z12.2 SCREENING FOR LUNG CANCER: ICD-10-CM

## 2023-11-17 DIAGNOSIS — Z87.891 HISTORY OF TOBACCO ABUSE: ICD-10-CM

## 2023-11-17 DIAGNOSIS — M65.332 TRIGGER MIDDLE FINGER OF LEFT HAND: ICD-10-CM

## 2023-11-17 PROCEDURE — 71271 CT THORAX LUNG CANCER SCR C-: CPT | Mod: TC

## 2023-11-17 PROCEDURE — 73130 X-RAY EXAM OF HAND: CPT | Mod: 26,LT,, | Performed by: RADIOLOGY

## 2023-11-17 PROCEDURE — 71271 CT THORAX LUNG CANCER SCR C-: CPT | Mod: 26,,, | Performed by: RADIOLOGY

## 2023-11-17 PROCEDURE — 73130 X-RAY EXAM OF HAND: CPT | Mod: TC,LT

## 2023-11-17 PROCEDURE — 73130 XR HAND COMPLETE 3 VIEW LEFT: ICD-10-PCS | Mod: 26,LT,, | Performed by: RADIOLOGY

## 2023-11-17 PROCEDURE — 71271 CT CHEST LUNG SCREENING LOW DOSE: ICD-10-PCS | Mod: 26,,, | Performed by: RADIOLOGY

## 2023-11-28 ENCOUNTER — OFFICE VISIT (OUTPATIENT)
Dept: ORTHOPEDICS | Facility: CLINIC | Age: 58
End: 2023-11-28
Payer: MEDICARE

## 2023-11-28 ENCOUNTER — DOCUMENTATION ONLY (OUTPATIENT)
Dept: ORTHOPEDICS | Facility: CLINIC | Age: 58
End: 2023-11-28

## 2023-11-28 VITALS
BODY MASS INDEX: 40.43 KG/M2 | DIASTOLIC BLOOD PRESSURE: 98 MMHG | HEART RATE: 84 BPM | HEIGHT: 74 IN | WEIGHT: 315 LBS | SYSTOLIC BLOOD PRESSURE: 166 MMHG

## 2023-11-28 DIAGNOSIS — M65.332 TRIGGER MIDDLE FINGER OF LEFT HAND: ICD-10-CM

## 2023-11-28 PROCEDURE — 99999PBSHW PR PBB SHADOW TECHNICAL ONLY FILED TO HB: ICD-10-PCS | Mod: PBBFAC,,,

## 2023-11-28 PROCEDURE — 99999 PR PBB SHADOW E&M-EST. PATIENT-LVL III: CPT | Mod: PBBFAC,,, | Performed by: SPECIALIST/TECHNOLOGIST

## 2023-11-28 PROCEDURE — 99999PBSHW PR PBB SHADOW TECHNICAL ONLY FILED TO HB: Mod: PBBFAC,,,

## 2023-11-28 PROCEDURE — 20550 NJX 1 TENDON SHEATH/LIGAMENT: CPT | Mod: PBBFAC,LT | Performed by: SPECIALIST/TECHNOLOGIST

## 2023-11-28 PROCEDURE — 99213 PR OFFICE/OUTPT VISIT, EST, LEVL III, 20-29 MIN: ICD-10-PCS | Mod: S$PBB,25,, | Performed by: SPECIALIST/TECHNOLOGIST

## 2023-11-28 PROCEDURE — 20550 NJX 1 TENDON SHEATH/LIGAMENT: CPT | Mod: S$PBB,F2,, | Performed by: SPECIALIST/TECHNOLOGIST

## 2023-11-28 PROCEDURE — 20550 PR INJECT TENDON SHEATH/LIGAMENT: ICD-10-PCS | Mod: S$PBB,F2,, | Performed by: SPECIALIST/TECHNOLOGIST

## 2023-11-28 PROCEDURE — 99213 OFFICE O/P EST LOW 20 MIN: CPT | Mod: S$PBB,25,, | Performed by: SPECIALIST/TECHNOLOGIST

## 2023-11-28 PROCEDURE — 99999 PR PBB SHADOW E&M-EST. PATIENT-LVL III: ICD-10-PCS | Mod: PBBFAC,,, | Performed by: SPECIALIST/TECHNOLOGIST

## 2023-11-28 PROCEDURE — 99213 OFFICE O/P EST LOW 20 MIN: CPT | Mod: PBBFAC,25 | Performed by: SPECIALIST/TECHNOLOGIST

## 2023-11-28 RX ORDER — DEXAMETHASONE SODIUM PHOSPHATE 4 MG/ML
4 INJECTION, SOLUTION INTRA-ARTICULAR; INTRALESIONAL; INTRAMUSCULAR; INTRAVENOUS; SOFT TISSUE
Status: COMPLETED | OUTPATIENT
Start: 2023-11-28 | End: 2023-11-28

## 2023-11-28 RX ORDER — LIDOCAINE HYDROCHLORIDE 10 MG/ML
0.5 INJECTION, SOLUTION EPIDURAL; INFILTRATION; INTRACAUDAL; PERINEURAL ONCE
Status: COMPLETED | OUTPATIENT
Start: 2023-11-28 | End: 2023-11-28

## 2023-11-28 RX ADMIN — DEXAMETHASONE SODIUM PHOSPHATE 4 MG: 4 INJECTION, SOLUTION INTRAMUSCULAR; INTRAVENOUS at 08:11

## 2023-11-28 RX ADMIN — LIDOCAINE HYDROCHLORIDE 5 MG: 10 INJECTION, SOLUTION EPIDURAL; INFILTRATION; INTRACAUDAL; PERINEURAL at 09:11

## 2023-11-28 NOTE — PROGRESS NOTES
"BP reassessed at end of visit but post INJ in Left middle finger. No visible signs of distress. States "I'ma take my pressure medicine when I get home".     "

## 2023-11-28 NOTE — PROGRESS NOTES
Subjective:      Patient ID: Fausto Pandey is a 58 y.o. male.    Chief Complaint: Pain of the Left Hand (Possible trigger finger LONG x 1.5 month. Wearing splint while sleeping.)      HPI  Fausto Pandey is a right hand dominant 58 y.o. male presenting today for L Long Finger Trigger Finger.  Patient states he has been doing with a triggering of the left long finger for about 3 months he states he woke up suddenly with his finger in a locked position.  He says he is most tender at the A1 pulley of the long finger.  He denies any numbness and tingling.  He states he is not done anything to help.  He states that weed whacking causes most of his pain.      Review of patient's allergies indicates:   Allergen Reactions    Spironolactone Other (See Comments)     Breast tenderness    Ibuprofen Other (See Comments)     Unable to take Ibuprofen due to decreased kidney function         Current Outpatient Medications   Medication Sig Dispense Refill    acetaminophen (TYLENOL) 500 MG tablet Take 1 tablet (500 mg total) by mouth every 6 (six) hours as needed for Pain. 30 tablet 0    allopurinoL (ZYLOPRIM) 100 MG tablet Take 1.5 tablets (150 mg total) by mouth once daily. 135 tablet 3    amLODIPine (NORVASC) 10 MG tablet Take 10 mg by mouth.      atorvastatin (LIPITOR) 80 MG tablet Take 1 tablet (80 mg total) by mouth once daily. 90 tablet 3    cetirizine (ZYRTEC) 10 MG tablet Take 1 tablet (10 mg total) by mouth 2 (two) times daily. 10 tablet 0    chlorthalidone (HYGROTEN) 25 MG Tab Take 1 tablet (25 mg total) by mouth once daily. 90 tablet 3    cholecalciferol, vitamin D3, (VITAMIN D3) 50 mcg (2,000 unit) Cap capsule Take 1 capsule by mouth once daily.      colchicine, gout, (COLCRYS) 0.6 mg tablet Take 1 tablet (0.6 mg total) by mouth every other day. 15 tablet 2    hydrALAZINE (APRESOLINE) 100 MG tablet Take 1 tablet (100 mg total) by mouth every 8 (eight) hours. 270 tablet 3    methocarbamoL (ROBAXIN) 750 MG Tab  "Take 1 tablet (750 mg total) by mouth 3 (three) times daily as needed (pain). 21 tablet 0    metoprolol succinate (TOPROL-XL) 25 MG 24 hr tablet Take 1 tablet (25 mg total) by mouth once daily. 90 tablet 3    NIFEdipine (PROCARDIA-XL) 90 MG (OSM) 24 hr tablet Take 1 tablet (90 mg total) by mouth once daily. 90 tablet 3    olmesartan (BENICAR) 40 MG tablet TAKE 1 TABLET(40 MG) BY MOUTH EVERY DAY 90 tablet 3    traMADoL (ULTRAM) 50 mg tablet Take 1-2 tablets ( mg total) by mouth nightly as needed for Pain. 20 tablet 0     No current facility-administered medications for this visit.       Past Medical History:   Diagnosis Date    Benign essential HTN 8/22/2017    Chronic kidney disease, stage III (moderate) 8/22/2017    Internal derangement of left knee 10/9/2017       No past surgical history on file.    Review of Systems:  Constitutional: Negative for chills and fever.   Respiratory: Negative for cough and shortness of breath.    Gastrointestinal: Negative for nausea and vomiting.   Skin: Negative for rash.   Neurological: Negative for dizziness and headaches.   Psychiatric/Behavioral: Negative for depression.   MSK as in HPI       OBJECTIVE:     PHYSICAL EXAM:  BP (!) 164/105   Pulse 84   Ht 6' 2" (1.88 m)   Wt (!) 166.9 kg (367 lb 15.2 oz)   BMI 47.24 kg/m²     GEN:  NAD, well-developed, well-groomed.  NEURO: Awake, alert, and oriented. Normal attention and concentration.    PSYCH: Normal mood and affect. Behavior is normal.  HEENT: No cervical lymphadenopathy noted.  CARDIOVASCULAR: Radial pulses 2+ bilaterally. No LE edema noted.  PULMONARY: Breath sounds normal. No respiratory distress.  SKIN: Intact, no rashes.      MSK:     LUE:  TTP at A1 pulley of the Long Finger. Active Triggering.     Good active ROM of the wrist and fingers. AIN/PIN/Radial/Median/Ulnar Nerves assessed in isolation without deficit. Radial & Ulnar arteries palpated 2+. Capillary Refill <3s.      RADIOGRAPHS:  L Hand " XR  11/27/23  FINDINGS:  No fracture or dislocation.  No bone destruction identified     Impression:     See above    Comments: I have personally reviewed the imaging and I agree with the above radiologist's report.    ASSESSMENT/PLAN:       ICD-10-CM ICD-9-CM   1. Trigger middle finger of left hand  M65.332 727.03          No orders of the defined types were placed in this encounter.       Plan:   Treatment options discussed with therapy and injections. Patient would like to try injections at this time.   F/u in clinic in 6 weeks    PROCEDURE:  I have explained the risks, benefits, and alternatives of the procedure in detail.  The patient voices understanding and all questions have been answered.  The patient agrees to proceed as planned. So after a sterile prep of the skin in the normal fashion the left flexor tendon sheath of the long finger is injected from the volar approach using a 25 gauge needle with a combination of Injection Types: 0.5 mL 1% Lidocaine with 4 mg dexamethasone  The patient is cautioned and immediate relief of pain is secondary to the local anesthetic and will be temporary.  After the anesthetic wears off there may be a increase in pain that may last for a few hours or a few days and they should use ice to help alleviate this flair up of pain. Patient tolerated the procedure well.     The patient indicates understanding of these issues and agrees to the plan.    Papo Díaz PA-C, [facility]  Hand Clinic   [facility]  [place], [place]   Statement Selected

## 2023-12-29 ENCOUNTER — PATIENT MESSAGE (OUTPATIENT)
Dept: ORTHOPEDICS | Facility: CLINIC | Age: 58
End: 2023-12-29
Payer: MEDICARE

## 2024-01-03 ENCOUNTER — DOCUMENTATION ONLY (OUTPATIENT)
Dept: ORTHOPEDICS | Facility: CLINIC | Age: 59
End: 2024-01-03
Payer: MEDICARE

## 2024-01-03 NOTE — PROGRESS NOTES
Spoke with pt and informed him that hillary did not have anything avail before his 1/9/24 appt  the pt stated that was ok and he'll be here on the 9th

## 2024-01-09 ENCOUNTER — OFFICE VISIT (OUTPATIENT)
Dept: ORTHOPEDICS | Facility: CLINIC | Age: 59
End: 2024-01-09
Payer: MEDICARE

## 2024-01-09 VITALS — BODY MASS INDEX: 40.43 KG/M2 | HEIGHT: 74 IN | WEIGHT: 315 LBS

## 2024-01-09 DIAGNOSIS — M65.332 TRIGGER MIDDLE FINGER OF LEFT HAND: Primary | ICD-10-CM

## 2024-01-09 PROCEDURE — 99213 OFFICE O/P EST LOW 20 MIN: CPT | Mod: PBBFAC | Performed by: SPECIALIST/TECHNOLOGIST

## 2024-01-09 PROCEDURE — 99999 PR PBB SHADOW E&M-EST. PATIENT-LVL III: CPT | Mod: PBBFAC,,, | Performed by: SPECIALIST/TECHNOLOGIST

## 2024-01-09 PROCEDURE — 99213 OFFICE O/P EST LOW 20 MIN: CPT | Mod: S$PBB,,, | Performed by: SPECIALIST/TECHNOLOGIST

## 2024-01-09 NOTE — PROGRESS NOTES
Subjective:      Patient ID: Fausto Pandey is a 58 y.o. male.    Chief Complaint: Pain of the Left Hand      HPI  11/28/23  Fausto Pandey is a right hand dominant 58 y.o. male presenting today for L Long Finger Trigger Finger.  Patient states he has been doing with a triggering of the left long finger for about 3 months he states he woke up suddenly with his finger in a locked position.  He says he is most tender at the A1 pulley of the long finger.  He denies any numbness and tingling.  He states he is not done anything to help.  He states that weed whacking causes most of his pain.    Interval HPI  01/09/2024  Patient reports today for follow up of left long trigger finger.  He states that last injection only lasted about 3 weeks.  He states he is having continued locking sensation as well as pain localized at the A1 pulley of the long finger.  He denies any numbness and tingling.  He is interested in having surgical intervention.    Review of patient's allergies indicates:   Allergen Reactions    Spironolactone Other (See Comments)     Breast tenderness    Ibuprofen Other (See Comments)     Unable to take Ibuprofen due to decreased kidney function         Current Outpatient Medications   Medication Sig Dispense Refill    acetaminophen (TYLENOL) 500 MG tablet Take 1 tablet (500 mg total) by mouth every 6 (six) hours as needed for Pain. 30 tablet 0    allopurinoL (ZYLOPRIM) 100 MG tablet Take 1.5 tablets (150 mg total) by mouth once daily. 135 tablet 3    amLODIPine (NORVASC) 10 MG tablet Take 10 mg by mouth.      atorvastatin (LIPITOR) 80 MG tablet Take 1 tablet (80 mg total) by mouth once daily. 90 tablet 3    cetirizine (ZYRTEC) 10 MG tablet Take 1 tablet (10 mg total) by mouth 2 (two) times daily. 10 tablet 0    chlorthalidone (HYGROTEN) 25 MG Tab Take 1 tablet (25 mg total) by mouth once daily. 90 tablet 3    cholecalciferol, vitamin D3, (VITAMIN D3) 50 mcg (2,000 unit) Cap capsule Take 1 capsule  "by mouth once daily.      hydrALAZINE (APRESOLINE) 100 MG tablet Take 1 tablet (100 mg total) by mouth every 8 (eight) hours. 270 tablet 3    methocarbamoL (ROBAXIN) 750 MG Tab Take 1 tablet (750 mg total) by mouth 3 (three) times daily as needed (pain). 21 tablet 0    metoprolol succinate (TOPROL-XL) 25 MG 24 hr tablet Take 1 tablet (25 mg total) by mouth once daily. 90 tablet 3    NIFEdipine (PROCARDIA-XL) 90 MG (OSM) 24 hr tablet Take 1 tablet (90 mg total) by mouth once daily. 90 tablet 3    olmesartan (BENICAR) 40 MG tablet TAKE 1 TABLET(40 MG) BY MOUTH EVERY DAY 90 tablet 3    traMADoL (ULTRAM) 50 mg tablet Take 1-2 tablets ( mg total) by mouth nightly as needed for Pain. 20 tablet 0    colchicine, gout, (COLCRYS) 0.6 mg tablet Take 1 tablet (0.6 mg total) by mouth every other day. 15 tablet 2     No current facility-administered medications for this visit.       Past Medical History:   Diagnosis Date    Benign essential HTN 8/22/2017    Chronic kidney disease, stage III (moderate) 8/22/2017    Internal derangement of left knee 10/9/2017       History reviewed. No pertinent surgical history.    Review of Systems:  Constitutional: Negative for chills and fever.   Respiratory: Negative for cough and shortness of breath.    Gastrointestinal: Negative for nausea and vomiting.   Skin: Negative for rash.   Neurological: Negative for dizziness and headaches.   Psychiatric/Behavioral: Negative for depression.   MSK as in HPI       OBJECTIVE:     PHYSICAL EXAM:  Ht 6' 2" (1.88 m)   Wt (!) 166.9 kg (367 lb 15.2 oz)   BMI 47.24 kg/m²     GEN:  NAD, well-developed, well-groomed.  NEURO: Awake, alert, and oriented. Normal attention and concentration.    PSYCH: Normal mood and affect. Behavior is normal.  HEENT: No cervical lymphadenopathy noted.  CARDIOVASCULAR: Radial pulses 2+ bilaterally. No LE edema noted.  PULMONARY: Breath sounds normal. No respiratory distress.  SKIN: Intact, no rashes.      MSK: "     LUE:  TTP at A1 pulley of the Long Finger. Active Triggering.     Good active ROM of the wrist and fingers. AIN/PIN/Radial/Median/Ulnar Nerves assessed in isolation without deficit. Radial & Ulnar arteries palpated 2+. Capillary Refill <3s.      RADIOGRAPHS:  L Hand XR  11/27/23  FINDINGS:  No fracture or dislocation.  No bone destruction identified     Impression:     See above    Comments: I have personally reviewed the imaging and I agree with the above radiologist's report.    ASSESSMENT/PLAN:       ICD-10-CM ICD-9-CM   1. Trigger middle finger of left hand  M65.332 727.03            No orders of the defined types were placed in this encounter.       Plan:   Patient has failed 1 round of injections.  He would not like to repeat any injections and would like to proceed with surgical intervention.  Consents obtained in clinic and he will follow up 2 weeks postop.    The patient indicates understanding of these issues and agrees to the plan.    Papo Díaz PA-C, AT[facility]  Hand Clinic   [facility]  [place], [place]

## 2024-01-11 DIAGNOSIS — M65.332 TRIGGER MIDDLE FINGER OF LEFT HAND: Primary | ICD-10-CM

## 2024-02-15 ENCOUNTER — ANESTHESIA EVENT (OUTPATIENT)
Dept: SURGERY | Facility: HOSPITAL | Age: 59
End: 2024-02-15
Payer: MEDICARE

## 2024-02-15 DIAGNOSIS — M65.332 TRIGGER MIDDLE FINGER OF LEFT HAND: ICD-10-CM

## 2024-02-15 RX ORDER — MUPIROCIN 20 MG/G
OINTMENT TOPICAL
Status: CANCELLED | OUTPATIENT
Start: 2024-02-15

## 2024-02-15 RX ORDER — SODIUM CHLORIDE 9 MG/ML
INJECTION, SOLUTION INTRAVENOUS CONTINUOUS
Status: CANCELLED | OUTPATIENT
Start: 2024-02-15

## 2024-02-16 RX ORDER — TRAMADOL HYDROCHLORIDE 50 MG/1
50 TABLET ORAL EVERY 6 HOURS PRN
Qty: 10 TABLET | Refills: 0 | Status: SHIPPED | OUTPATIENT
Start: 2024-02-16 | End: 2024-02-23 | Stop reason: HOSPADM

## 2024-02-22 ENCOUNTER — TELEPHONE (OUTPATIENT)
Dept: ORTHOPEDICS | Facility: CLINIC | Age: 59
End: 2024-02-22
Payer: MEDICARE

## 2024-02-22 ENCOUNTER — PATIENT MESSAGE (OUTPATIENT)
Dept: INTERNAL MEDICINE | Facility: CLINIC | Age: 59
End: 2024-02-22
Payer: MEDICARE

## 2024-02-22 NOTE — TELEPHONE ENCOUNTER
SHAZIAM to Inform pt of 7:45 a.m. arrival time for  02/23/24 surgery at the [facility]. Reminded pt of NPO status. Requested a call back to the [facility] at [phone] with any questions and to confirm.   My O message sent

## 2024-02-23 ENCOUNTER — ANESTHESIA (OUTPATIENT)
Dept: SURGERY | Facility: HOSPITAL | Age: 59
End: 2024-02-23
Payer: MEDICARE

## 2024-02-23 ENCOUNTER — HOSPITAL ENCOUNTER (OUTPATIENT)
Facility: HOSPITAL | Age: 59
Discharge: HOME | End: 2024-02-23
Attending: ORTHOPAEDIC SURGERY | Admitting: ORTHOPAEDIC SURGERY
Payer: MEDICARE

## 2024-02-23 VITALS
HEIGHT: 74 IN | RESPIRATION RATE: 22 BRPM | WEIGHT: 315 LBS | OXYGEN SATURATION: 96 % | DIASTOLIC BLOOD PRESSURE: 90 MMHG | HEART RATE: 76 BPM | BODY MASS INDEX: 40.43 KG/M2 | SYSTOLIC BLOOD PRESSURE: 150 MMHG | TEMPERATURE: 98 F

## 2024-02-23 DIAGNOSIS — M65.332 TRIGGER MIDDLE FINGER OF LEFT HAND: ICD-10-CM

## 2024-02-23 PROCEDURE — 25000003 PHARM REV CODE 250: Performed by: STUDENT IN AN ORGANIZED HEALTH CARE EDUCATION/TRAINING PROGRAM

## 2024-02-23 PROCEDURE — 71000015 HC POSTOP RECOV 1ST HR: Performed by: ORTHOPAEDIC SURGERY

## 2024-02-23 PROCEDURE — 63600175 PHARM REV CODE 636 W HCPCS: Mod: JZ,JG | Performed by: ORTHOPAEDIC SURGERY

## 2024-02-23 PROCEDURE — 25000003 PHARM REV CODE 250: Performed by: PHYSICIAN ASSISTANT

## 2024-02-23 PROCEDURE — 25000003 PHARM REV CODE 250: Performed by: NURSE ANESTHETIST, CERTIFIED REGISTERED

## 2024-02-23 PROCEDURE — 26055 INCISE FINGER TENDON SHEATH: CPT | Mod: F2,,, | Performed by: ORTHOPAEDIC SURGERY

## 2024-02-23 PROCEDURE — D9220A PRA ANESTHESIA: Mod: CRNA,,, | Performed by: NURSE ANESTHETIST, CERTIFIED REGISTERED

## 2024-02-23 PROCEDURE — 94761 N-INVAS EAR/PLS OXIMETRY MLT: CPT

## 2024-02-23 PROCEDURE — 37000009 HC ANESTHESIA EA ADD 15 MINS: Performed by: ORTHOPAEDIC SURGERY

## 2024-02-23 PROCEDURE — 71000033 HC RECOVERY, INTIAL HOUR: Performed by: ORTHOPAEDIC SURGERY

## 2024-02-23 PROCEDURE — D9220A PRA ANESTHESIA: Mod: ANES,,, | Performed by: ANESTHESIOLOGY

## 2024-02-23 PROCEDURE — 27201423 OPTIME MED/SURG SUP & DEVICES STERILE SUPPLY: Performed by: ORTHOPAEDIC SURGERY

## 2024-02-23 PROCEDURE — 63600175 PHARM REV CODE 636 W HCPCS: Performed by: NURSE ANESTHETIST, CERTIFIED REGISTERED

## 2024-02-23 PROCEDURE — 36000706: Performed by: ORTHOPAEDIC SURGERY

## 2024-02-23 PROCEDURE — 99900035 HC TECH TIME PER 15 MIN (STAT)

## 2024-02-23 PROCEDURE — 37000008 HC ANESTHESIA 1ST 15 MINUTES: Performed by: ORTHOPAEDIC SURGERY

## 2024-02-23 PROCEDURE — 25000003 PHARM REV CODE 250: Performed by: ORTHOPAEDIC SURGERY

## 2024-02-23 PROCEDURE — 36000707: Performed by: ORTHOPAEDIC SURGERY

## 2024-02-23 RX ORDER — SODIUM CHLORIDE 0.9 % (FLUSH) 0.9 %
10 SYRINGE (ML) INJECTION
Status: DISCONTINUED | OUTPATIENT
Start: 2024-02-23 | End: 2024-02-23 | Stop reason: HOSPADM

## 2024-02-23 RX ORDER — BUPIVACAINE HYDROCHLORIDE 2.5 MG/ML
INJECTION, SOLUTION EPIDURAL; INFILTRATION; INTRACAUDAL
Status: DISCONTINUED | OUTPATIENT
Start: 2024-02-23 | End: 2024-02-23 | Stop reason: HOSPADM

## 2024-02-23 RX ORDER — METOCLOPRAMIDE HYDROCHLORIDE 5 MG/ML
5 INJECTION INTRAMUSCULAR; INTRAVENOUS EVERY 6 HOURS PRN
Status: CANCELLED | OUTPATIENT
Start: 2024-02-23

## 2024-02-23 RX ORDER — SODIUM CHLORIDE 9 MG/ML
INJECTION, SOLUTION INTRAVENOUS CONTINUOUS
Status: DISCONTINUED | OUTPATIENT
Start: 2024-02-23 | End: 2024-02-23 | Stop reason: HOSPADM

## 2024-02-23 RX ORDER — HYDROCODONE BITARTRATE AND ACETAMINOPHEN 5; 325 MG/1; MG/1
1 TABLET ORAL EVERY 4 HOURS PRN
Status: CANCELLED | OUTPATIENT
Start: 2024-02-23

## 2024-02-23 RX ORDER — HYDROCODONE BITARTRATE AND ACETAMINOPHEN 5; 325 MG/1; MG/1
1 TABLET ORAL EVERY 6 HOURS PRN
Qty: 10 TABLET | Refills: 0 | Status: SHIPPED | OUTPATIENT
Start: 2024-02-23 | End: 2024-04-03 | Stop reason: SDUPTHER

## 2024-02-23 RX ORDER — LIDOCAINE HYDROCHLORIDE 20 MG/ML
INJECTION INTRAVENOUS
Status: DISCONTINUED | OUTPATIENT
Start: 2024-02-23 | End: 2024-02-23

## 2024-02-23 RX ORDER — PROPOFOL 10 MG/ML
VIAL (ML) INTRAVENOUS CONTINUOUS PRN
Status: DISCONTINUED | OUTPATIENT
Start: 2024-02-23 | End: 2024-02-23

## 2024-02-23 RX ORDER — MUPIROCIN 20 MG/G
OINTMENT TOPICAL 2 TIMES DAILY
Status: CANCELLED | OUTPATIENT
Start: 2024-02-23 | End: 2024-02-25

## 2024-02-23 RX ORDER — FAMOTIDINE 10 MG/ML
INJECTION INTRAVENOUS
Status: DISCONTINUED | OUTPATIENT
Start: 2024-02-23 | End: 2024-02-23

## 2024-02-23 RX ORDER — BACITRACIN ZINC 500 UNIT/G
OINTMENT (GRAM) TOPICAL
Status: DISCONTINUED | OUTPATIENT
Start: 2024-02-23 | End: 2024-02-23 | Stop reason: HOSPADM

## 2024-02-23 RX ORDER — ACETAMINOPHEN 500 MG
1000 TABLET ORAL
Status: COMPLETED | OUTPATIENT
Start: 2024-02-23 | End: 2024-02-23

## 2024-02-23 RX ORDER — ONDANSETRON HYDROCHLORIDE 2 MG/ML
INJECTION, SOLUTION INTRAVENOUS
Status: DISCONTINUED | OUTPATIENT
Start: 2024-02-23 | End: 2024-02-23

## 2024-02-23 RX ORDER — PROPOFOL 10 MG/ML
VIAL (ML) INTRAVENOUS
Status: DISCONTINUED | OUTPATIENT
Start: 2024-02-23 | End: 2024-02-23

## 2024-02-23 RX ORDER — MIDAZOLAM HYDROCHLORIDE 1 MG/ML
INJECTION, SOLUTION INTRAMUSCULAR; INTRAVENOUS
Status: DISCONTINUED | OUTPATIENT
Start: 2024-02-23 | End: 2024-02-23

## 2024-02-23 RX ORDER — ONDANSETRON HYDROCHLORIDE 2 MG/ML
4 INJECTION, SOLUTION INTRAVENOUS EVERY 12 HOURS PRN
Status: CANCELLED | OUTPATIENT
Start: 2024-02-23

## 2024-02-23 RX ORDER — DEXAMETHASONE SODIUM PHOSPHATE 4 MG/ML
INJECTION, SOLUTION INTRA-ARTICULAR; INTRALESIONAL; INTRAMUSCULAR; INTRAVENOUS; SOFT TISSUE
Status: DISCONTINUED | OUTPATIENT
Start: 2024-02-23 | End: 2024-02-23

## 2024-02-23 RX ORDER — ACETAMINOPHEN 325 MG/1
650 TABLET ORAL EVERY 4 HOURS PRN
Status: CANCELLED | OUTPATIENT
Start: 2024-02-23

## 2024-02-23 RX ORDER — LIDOCAINE HYDROCHLORIDE 10 MG/ML
INJECTION, SOLUTION EPIDURAL; INFILTRATION; INTRACAUDAL; PERINEURAL
Status: DISCONTINUED | OUTPATIENT
Start: 2024-02-23 | End: 2024-02-23 | Stop reason: HOSPADM

## 2024-02-23 RX ORDER — MUPIROCIN 20 MG/G
OINTMENT TOPICAL
Status: DISCONTINUED | OUTPATIENT
Start: 2024-02-23 | End: 2024-02-23 | Stop reason: HOSPADM

## 2024-02-23 RX ORDER — METHOCARBAMOL 500 MG/1
1000 TABLET, FILM COATED ORAL ONCE
Status: DISCONTINUED | OUTPATIENT
Start: 2024-02-23 | End: 2024-02-23 | Stop reason: HOSPADM

## 2024-02-23 RX ORDER — CEFAZOLIN SODIUM 1 G/3ML
INJECTION, POWDER, FOR SOLUTION INTRAMUSCULAR; INTRAVENOUS
Status: DISCONTINUED | OUTPATIENT
Start: 2024-02-23 | End: 2024-02-23

## 2024-02-23 RX ORDER — HYDROCODONE BITARTRATE AND ACETAMINOPHEN 10; 325 MG/1; MG/1
1 TABLET ORAL EVERY 4 HOURS PRN
Status: CANCELLED | OUTPATIENT
Start: 2024-02-23

## 2024-02-23 RX ADMIN — DEXAMETHASONE SODIUM PHOSPHATE 4 MG: 4 INJECTION, SOLUTION INTRAMUSCULAR; INTRAVENOUS at 09:02

## 2024-02-23 RX ADMIN — PROPOFOL 75 MCG/KG/MIN: 10 INJECTION, EMULSION INTRAVENOUS at 09:02

## 2024-02-23 RX ADMIN — MUPIROCIN: 20 OINTMENT TOPICAL at 08:02

## 2024-02-23 RX ADMIN — SODIUM CHLORIDE: 9 INJECTION, SOLUTION INTRAVENOUS at 08:02

## 2024-02-23 RX ADMIN — PROPOFOL 50 MG: 10 INJECTION, EMULSION INTRAVENOUS at 09:02

## 2024-02-23 RX ADMIN — MIDAZOLAM HYDROCHLORIDE 2 MG: 1 INJECTION, SOLUTION INTRAMUSCULAR; INTRAVENOUS at 08:02

## 2024-02-23 RX ADMIN — CEFAZOLIN 3 G: 330 INJECTION, POWDER, FOR SOLUTION INTRAMUSCULAR; INTRAVENOUS at 09:02

## 2024-02-23 RX ADMIN — ACETAMINOPHEN 1000 MG: 500 TABLET ORAL at 08:02

## 2024-02-23 RX ADMIN — LIDOCAINE HYDROCHLORIDE 50 MG: 20 INJECTION INTRAVENOUS at 09:02

## 2024-02-23 RX ADMIN — FAMOTIDINE 20 MG: 10 INJECTION, SOLUTION INTRAVENOUS at 08:02

## 2024-02-23 RX ADMIN — ONDANSETRON 4 MG: 2 INJECTION INTRAMUSCULAR; INTRAVENOUS at 08:02

## 2024-02-23 NOTE — BRIEF OP NOTE
[facility] - Surgery (Hospital)  Brief Operative Note    Surgery Date: 2/23/2024     Surgeon(s) and Role:     * Glodie Garcia MD - Primary    Assisting Surgeon: None    Pre-op Diagnosis:  Trigger middle finger of left hand [M65.332]    Post-op Diagnosis:  Post-Op Diagnosis Codes:     * Trigger middle finger of left hand [M65.332]    Procedure(s) (LRB):  RELEASE, TRIGGER ,LEFT MIDDLE FINGER (Left)    Anesthesia: Local MAC    Operative Findings: see op note    Estimated Blood Loss: * No values recorded between 2/23/2024  9:06 AM and 2/23/2024  9:20 AM *         Specimens:   Specimen (24h ago, onward)      None              Discharge Note    OUTCOME: Patient tolerated treatment/procedure well without complication and is now ready for discharge.    DISPOSITION: Home or Self Care    FINAL DIAGNOSIS:  Trigger middle finger of left hand    FOLLOWUP: In clinic    DISCHARGE INSTRUCTIONS:    Discharge Procedure Orders   Diet general     Sponge bath only until clinic visit     Keep surgical extremity elevated     Lifting restrictions   Order Comments: No lifting >5lbs     No driving, operating heavy equipment or signing legal documents while taking pain medication.     Leave dressing on - Keep it clean, dry, and intact until clinic visit     Call MD for:  temperature >100.4     Call MD for:  persistent nausea and vomiting     Call MD for:  severe uncontrolled pain     Call MD for:  difficulty breathing, headache or visual disturbances     Call MD for:  redness, tenderness, or signs of infection (pain, swelling, redness, odor or green/yellow discharge around incision site)     Call MD for:  hives     Call MD for:  persistent dizziness or light-headedness     Call MD for:  extreme fatigue     Shower on day dressing removed (No bath)

## 2024-02-23 NOTE — ANESTHESIA POSTPROCEDURE EVALUATION
Anesthesia Post Evaluation    Patient: Fausto Pandey    Procedure(s) Performed: Procedure(s) (LRB):  RELEASE, TRIGGER ,LEFT MIDDLE FINGER (Left)    Final Anesthesia Type: general      Patient location during evaluation: PACU  Patient participation: Yes- Able to Participate  Level of consciousness: awake and alert and oriented  Post-procedure vital signs: reviewed and stable  Pain management: adequate  Airway patency: patent    PONV status at discharge: No PONV  Anesthetic complications: no      Cardiovascular status: hemodynamically stable  Respiratory status: nasal cannula  Hydration status: euvolemic  Follow-up not needed.          Vitals Value Taken Time   /94 02/23/24 0946   Temp 36.8 °C (98.3 °F) 02/23/24 0925   Pulse 76 02/23/24 0950   Resp 36 02/23/24 0950   SpO2 94 % 02/23/24 0950   Vitals shown include unvalidated device data.      No case tracking events are documented in the log.      Pain/Yue Score: Pain Rating Prior to Med Admin: 6 (2/23/2024  8:10 AM)  Yue Score: 9 (2/23/2024  9:23 AM)

## 2024-02-23 NOTE — PLAN OF CARE
Pre op complete. Patient resting comfortably. Call light in reach. Wife to bedside shortly, will take belongings. Waiting on anesthesia consent.

## 2024-02-23 NOTE — H&P
Subjective:         Subjective   Patient ID: Fausto Pandey is a 58 y.o. male.     Chief Complaint: Pain of the Left Hand        HPI  11/28/23  Fausto Pandey is a right hand dominant 58 y.o. male presenting today for L Long Finger Trigger Finger.  Patient states he has been doing with a triggering of the left long finger for about 3 months he states he woke up suddenly with his finger in a locked position.  He says he is most tender at the A1 pulley of the long finger.  He denies any numbness and tingling.  He states he is not done anything to help.  He states that weed whacking causes most of his pain.     Interval HPI  01/09/2024  Patient reports today for follow up of left long trigger finger.  He states that last injection only lasted about 3 weeks.  He states he is having continued locking sensation as well as pain localized at the A1 pulley of the long finger.  He denies any numbness and tingling.  He is interested in having surgical intervention.    Int hx 2/23/24: Ready for OR.            Review of patient's allergies indicates:   Allergen Reactions    Spironolactone Other (See Comments)       Breast tenderness    Ibuprofen Other (See Comments)       Unable to take Ibuprofen due to decreased kidney function          Current Medications          Current Outpatient Medications   Medication Sig Dispense Refill    acetaminophen (TYLENOL) 500 MG tablet Take 1 tablet (500 mg total) by mouth every 6 (six) hours as needed for Pain. 30 tablet 0    allopurinoL (ZYLOPRIM) 100 MG tablet Take 1.5 tablets (150 mg total) by mouth once daily. 135 tablet 3    amLODIPine (NORVASC) 10 MG tablet Take 10 mg by mouth.        atorvastatin (LIPITOR) 80 MG tablet Take 1 tablet (80 mg total) by mouth once daily. 90 tablet 3    cetirizine (ZYRTEC) 10 MG tablet Take 1 tablet (10 mg total) by mouth 2 (two) times daily. 10 tablet 0    chlorthalidone (HYGROTEN) 25 MG Tab Take 1 tablet (25 mg total) by mouth once daily. 90  "tablet 3    cholecalciferol, vitamin D3, (VITAMIN D3) 50 mcg (2,000 unit) Cap capsule Take 1 capsule by mouth once daily.        hydrALAZINE (APRESOLINE) 100 MG tablet Take 1 tablet (100 mg total) by mouth every 8 (eight) hours. 270 tablet 3    methocarbamoL (ROBAXIN) 750 MG Tab Take 1 tablet (750 mg total) by mouth 3 (three) times daily as needed (pain). 21 tablet 0    metoprolol succinate (TOPROL-XL) 25 MG 24 hr tablet Take 1 tablet (25 mg total) by mouth once daily. 90 tablet 3    NIFEdipine (PROCARDIA-XL) 90 MG (OSM) 24 hr tablet Take 1 tablet (90 mg total) by mouth once daily. 90 tablet 3    olmesartan (BENICAR) 40 MG tablet TAKE 1 TABLET(40 MG) BY MOUTH EVERY DAY 90 tablet 3    traMADoL (ULTRAM) 50 mg tablet Take 1-2 tablets ( mg total) by mouth nightly as needed for Pain. 20 tablet 0    colchicine, gout, (COLCRYS) 0.6 mg tablet Take 1 tablet (0.6 mg total) by mouth every other day. 15 tablet 2      No current facility-administered medications for this visit.                 Past Medical History:   Diagnosis Date    Benign essential HTN 8/22/2017    Chronic kidney disease, stage III (moderate) 8/22/2017    Internal derangement of left knee 10/9/2017         History reviewed. No pertinent surgical history.     Review of Systems:  Constitutional: Negative for chills and fever.   Respiratory: Negative for cough and shortness of breath.    Gastrointestinal: Negative for nausea and vomiting.   Skin: Negative for rash.   Neurological: Negative for dizziness and headaches.   Psychiatric/Behavioral: Negative for depression.   MSK as in HPI         OBJECTIVE:      PHYSICAL EXAM:  Ht 6' 2" (1.88 m)   Wt (!) 166.9 kg (367 lb 15.2 oz)   BMI 47.24 kg/m²      GEN:  NAD, well-developed, well-groomed.  NEURO: Awake, alert, and oriented. Normal attention and concentration.    PSYCH: Normal mood and affect. Behavior is normal.  HEENT: No cervical lymphadenopathy noted.  CARDIOVASCULAR: Radial pulses 2+ bilaterally. No " LE edema noted.  PULMONARY: Breath sounds normal. No respiratory distress.  SKIN: Intact, no rashes.       MSK:      LUE:  TTP at A1 pulley of the Long Finger. Active Triggering.      Good active ROM of the wrist and fingers. AIN/PIN/Radial/Median/Ulnar Nerves assessed in isolation without deficit. Radial & Ulnar arteries palpated 2+. Capillary Refill <3s.        RADIOGRAPHS:  L Hand XR  11/27/23  FINDINGS:  No fracture or dislocation.  No bone destruction identified     Impression:     See above     Comments: I have personally reviewed the imaging and I agree with the above radiologist's report.     ASSESSMENT/PLAN:          ICD-10-CM ICD-9-CM   1. Trigger middle finger of left hand  M65.332 727.03            No orders of the defined types were placed in this encounter.         Plan:   Patient has failed 1 round of injections.  He would not like to repeat any injections and would like to proceed with surgical intervention.  Consents obtained in clinic and he will follow up 2 weeks postop.     The patient indicates understanding of these issues and agrees to the plan.

## 2024-02-23 NOTE — ANESTHESIA PREPROCEDURE EVALUATION
02/23/2024  Fausto Pandey is a 58 y.o., male.    Procedure: RELEASE, TRIGGER ,LEFT MIDDLE FINGER (Left: Hand)   Anesthesia type: Local MAC   Diagnosis: Trigger middle finger of left hand [M65.332]     Patient Active Problem List   Diagnosis    Benign essential HTN    Chronic kidney disease, stage III (moderate)    Body mass index (BMI) of 40.0 to 44.9 in adult    Hypertriglyceridemia    Chronic midline low back pain without sciatica    Secondary hyperparathyroidism    Moderate tetrahydrocannabinol (THC) dependence    Aortic atherosclerosis    Pulmonary emphysema, unspecified emphysema type     History reviewed. No pertinent surgical history.  Current Discharge Medication List        START taking these medications    Details   traMADoL (ULTRAM) 50 mg tablet Take 1 tablet (50 mg total) by mouth every 6 (six) hours as needed for Pain.  Qty: 10 tablet, Refills: 0    Comments: Quantity prescribed more than 7 day supply? No. Bedside [place]           CONTINUE these medications which have NOT CHANGED    Details   acetaminophen (TYLENOL) 500 MG tablet Take 1 tablet (500 mg total) by mouth every 6 (six) hours as needed for Pain.  Qty: 30 tablet, Refills: 0      allopurinoL (ZYLOPRIM) 100 MG tablet Take 1.5 tablets (150 mg total) by mouth once daily.  Qty: 135 tablet, Refills: 3    Associated Diagnoses: Gout, unspecified cause, unspecified chronicity, unspecified site      amLODIPine (NORVASC) 10 MG tablet Take 10 mg by mouth.      atorvastatin (LIPITOR) 80 MG tablet Take 1 tablet (80 mg total) by mouth once daily.  Qty: 90 tablet, Refills: 3    Associated Diagnoses: Mixed hyperlipidemia      cetirizine (ZYRTEC) 10 MG tablet Take 1 tablet (10 mg total) by mouth 2 (two) times daily.  Qty: 10 tablet, Refills: 0    Associated Diagnoses: Allergic reaction, initial encounter      chlorthalidone (HYGROTEN) 25  MG Tab Take 1 tablet (25 mg total) by mouth once daily.  Qty: 90 tablet, Refills: 3    Associated Diagnoses: Benign essential HTN      cholecalciferol, vitamin D3, (VITAMIN D3) 50 mcg (2,000 unit) Cap capsule Take 1 capsule by mouth once daily.      colchicine, gout, (COLCRYS) 0.6 mg tablet Take 1 tablet (0.6 mg total) by mouth every other day.  Qty: 15 tablet, Refills: 2    Comments: Please place refill on file, patient does not currently need  Associated Diagnoses: Gout, unspecified cause, unspecified chronicity, unspecified site      hydrALAZINE (APRESOLINE) 100 MG tablet Take 1 tablet (100 mg total) by mouth every 8 (eight) hours.  Qty: 270 tablet, Refills: 3    Comments: Please fill when appropriate  Associated Diagnoses: Benign essential HTN      methocarbamoL (ROBAXIN) 750 MG Tab Take 1 tablet (750 mg total) by mouth 3 (three) times daily as needed (pain).  Qty: 21 tablet, Refills: 0      metoprolol succinate (TOPROL-XL) 25 MG 24 hr tablet Take 1 tablet (25 mg total) by mouth once daily.  Qty: 90 tablet, Refills: 3    Comments: .  Associated Diagnoses: Benign essential HTN      NIFEdipine (PROCARDIA-XL) 90 MG (OSM) 24 hr tablet Take 1 tablet (90 mg total) by mouth once daily.  Qty: 90 tablet, Refills: 3    Comments: D/c amlodipine  Associated Diagnoses: Hypertension, unspecified type      olmesartan (BENICAR) 40 MG tablet TAKE 1 TABLET(40 MG) BY MOUTH EVERY DAY  Qty: 90 tablet, Refills: 3    Associated Diagnoses: Benign essential HTN               Anesthesia Evaluation    I have reviewed the Patient Summary Reports.     I have reviewed the Nursing Notes. I have reviewed the NPO Status.   I have reviewed the Medications.     Review of Systems  Anesthesia Hx:  No problems with previous Anesthesia             Denies Family Hx of Anesthesia complications.    Denies Personal Hx of Anesthesia complications.                    Social:  Non-Smoker       Cardiovascular:  Exercise tolerance: good   Hypertension     Denies CAD.       Denies Angina.          Functional Capacity Can you climb two flights of stairs? ==> Yes                         Pulmonary:     Denies Asthma.    Denies Recent URI.  Denies Sleep Apnea.                Renal/:  Chronic Renal Disease, CRI                Hepatic/GI:   Denies PUD.  Denies Hiatal Hernia.  Denies GERD. Denies Liver Disease.  Denies Hepatitis.           Neurological:    Denies CVA.    Denies Seizures.                                Endocrine:  Denies Diabetes. Denies Hypothyroidism.              Physical Exam  General:  Well nourished       Airway/Jaw/Neck:  Airway Findings: Mouth Opening: Normal     Tongue: Normal      General Airway Assessment: Adult      Mallampati: I   TM Distance: Normal, at least 6 cm   Jaw/Neck Findings:     Neck ROM: Normal ROM   Neck Findings:  Girth Increased       Dental:  Dental Findings: Upper Dentures                Anesthesia Plan  Type of Anesthesia, risks & benefits discussed:  Anesthesia Type:  Gen Natural Airway    Patient's Preference:   Plan Factors:          Intra-op Monitoring Plan: standard ASA monitors and Standard ASA Monitors  Intra-op Monitoring Plan Comments:   Post Op Pain Control Plan:   Post Op Pain Control Plan Comments:     Induction:   IV  Beta Blocker:  Patient is not currently on a Beta-Blocker (No further documentation required).       Informed Consent: Informed consent signed with the Patient and all parties understand the risks and agree with anesthesia plan.  All questions answered.  Anesthesia consent signed with patient.  ASA Score: 3     Day of Surgery Review of History & Physical:              Ready For Surgery From Anesthesia Perspective.           Physical Exam  General: Well nourished    Airway:  Mallampati: I   Mouth Opening: Normal  TM Distance: Normal, at least 6 cm  Tongue: Normal  Neck ROM: Normal ROM  Neck: Girth Increased    Dental:  Upper Dentures      Anesthesia Plan  Type of Anesthesia, risks & benefits  discussed:    Anesthesia Type: Gen Natural Airway  Intra-op Monitoring Plan: standard ASA monitors and Standard ASA Monitors  Induction:  IV  Airway Plan: Direct  Informed Consent: Informed consent signed with the Patient and all parties understand the risks and agree with anesthesia plan.  All questions answered.   ASA Score: 3    Ready For Surgery From Anesthesia Perspective.     .

## 2024-02-23 NOTE — OP NOTE
[facility] ([facility])  Surgery Department  Operative Note    SUMMARY     Date of Procedure: 2/23/2024     Procedure: Procedure(s) (LRB):  RELEASE, TRIGGER ,LEFT MIDDLE FINGER (Left)     Surgeon(s) and Role:     * Goldie Garcia MD - Primary    Assisting Surgeon: Dean    Pre-Operative Diagnosis: Trigger middle finger of left hand [M65.332]    Post-Operative Diagnosis: Post-Op Diagnosis Codes:     * Trigger middle finger of left hand [M65.332]    Anesthesia: Local MAC    Technical Procedures Used: surgery    Description of the Findings of the Procedure:SPECIMENS: None.   COMPLICATIONS: None.   INDICATIONS FOR PROCEDURE: Fausto Pandey is a 58 y.o.year-old who has failed   conservative treatment for  left  longfinger trigger finger; therefore,   operative intervention was deemed necessary. Risks and benefits were explained   to the patient in clinic. Consents were performed in clinic.   PROCEDURE IN DETAIL: After the correct site was marked with the patient's   participation in the Holding Area, the patient was brought to the Operating   Room, placed in supine position, underwent MAC anesthesia. A well-padded   nonsterile tourniquet was placed on the right forearm. Time-out was called for   correct site, procedure and patient to be indicated. Under sterile conditions,   an injection of lidocaine 1% without epinephrine was injected at the A1 pulley   space. The arm was prepped and draped in normal sterile fashion. Incision was   marked out in a longitudinal fashion along the pulley. The arm was   exsanguinated using Esmarch. Tourniquet was insufflated 250 mmHg and that is   where it remained for 10 minutes. The incision was made. Careful dissection   down was maintained to the A1 pulley. The neurovascular structures were   retracted out of the way. The A1 pulley was identified. It was sharply   incised. It was completely incised proximally and distally. Portion of    pulley was also incised. The  tendon was then retracted out of the tendon sheath   using a right angle. The finger was placed through range of motion, showed it   did not trigger. The area was irrigated with copious amounts of normal saline.   Nylon closed the skin. Sterile dressing was applied. Tourniquet was deflated.   Brisk cap refill ensued. The patient was transferred the Recovery Room in   stable condition.   POSTOPERATIVE PLAN FOR THIS PATIENT: The patient is to keep the dressing clean, dry and   intact. We will take the sutures out at two weeks.     A large Notta nodule was present dora with significant fraying of the tendon    Significant Surgical Tasks Conducted by the Assistant(s), if Applicable: retraction    Complications: No    Estimated Blood Loss (EBL): * No values recorded between 2/23/2024  9:06 AM and 2/23/2024  9:20 AM *           Implants: * No implants in log *    Specimens:   Specimen (24h ago, onward)      None                    Condition: Good    Disposition: PACU - hemodynamically stable.    Attestation: I performed the procedure.    Discharge Note    SUMMARY     Admit Date: 2/23/2024    Discharge Date and Time: No discharge date for patient encounter.    Hospital Course (synopsis of major diagnoses, care, treatment, and services provided during the course of the hospital stay): surgery     Final Diagnosis: Post-Op Diagnosis Codes:     * Trigger middle finger of left hand [M65.332]    Disposition: Home or Self Care    Follow Up/Patient Instructions:     Medications:  Reconciled Home Medications:      Medication List        START taking these medications      traMADoL 50 mg tablet  Commonly known as: ULTRAM  Take 1 tablet (50 mg total) by mouth every 6 (six) hours as needed for Pain.            CONTINUE taking these medications      acetaminophen 500 MG tablet  Commonly known as: TYLENOL  Take 1 tablet (500 mg total) by mouth every 6 (six) hours as needed for Pain.     allopurinoL 100 MG tablet  Commonly known as:  ZYLOPRIM  Take 1.5 tablets (150 mg total) by mouth once daily.     amLODIPine 10 MG tablet  Commonly known as: NORVASC  Take 10 mg by mouth.     atorvastatin 80 MG tablet  Commonly known as: LIPITOR  Take 1 tablet (80 mg total) by mouth once daily.     cetirizine 10 MG tablet  Commonly known as: ZYRTEC  Take 1 tablet (10 mg total) by mouth 2 (two) times daily.     chlorthalidone 25 MG Tab  Commonly known as: HYGROTEN  Take 1 tablet (25 mg total) by mouth once daily.     cholecalciferol (vitamin D3) 50 mcg (2,000 unit) Cap capsule  Commonly known as: VITAMIN D3  Take 1 capsule by mouth once daily.     colchicine 0.6 mg tablet  Commonly known as: COLCRYS  Take 1 tablet (0.6 mg total) by mouth every other day.     hydrALAZINE 100 MG tablet  Commonly known as: APRESOLINE  Take 1 tablet (100 mg total) by mouth every 8 (eight) hours.     methocarbamoL 750 MG Tab  Commonly known as: ROBAXIN  Take 1 tablet (750 mg total) by mouth 3 (three) times daily as needed (pain).     metoprolol succinate 25 MG 24 hr tablet  Commonly known as: TOPROL-XL  Take 1 tablet (25 mg total) by mouth once daily.     NIFEdipine 90 MG (OSM) 24 hr tablet  Commonly known as: PROCARDIA-XL  Take 1 tablet (90 mg total) by mouth once daily.     olmesartan 40 MG tablet  Commonly known as: BENICAR  TAKE 1 TABLET(40 MG) BY MOUTH EVERY DAY            Discharge Procedure Orders   Diet general     Sponge bath only until clinic visit     Keep surgical extremity elevated     Lifting restrictions   Order Comments: No lifting >5lbs     No driving, operating heavy equipment or signing legal documents while taking pain medication.     Leave dressing on - Keep it clean, dry, and intact until clinic visit     Call MD for:  temperature >100.4     Call MD for:  persistent nausea and vomiting     Call MD for:  severe uncontrolled pain     Call MD for:  difficulty breathing, headache or visual disturbances     Call MD for:  redness, tenderness, or signs of infection  (pain, swelling, redness, odor or green/yellow discharge around incision site)     Call MD for:  hives     Call MD for:  persistent dizziness or light-headedness     Call MD for:  extreme fatigue     Shower on day dressing removed (No bath)

## 2024-02-23 NOTE — TRANSFER OF CARE
"Anesthesia Transfer of Care Note    Patient: Fausto Pandey    Procedure(s) Performed: Procedure(s) (LRB):  RELEASE, TRIGGER ,LEFT MIDDLE FINGER (Left)    Patient location: PACU    Anesthesia Type: general    Transport from OR: Transported from OR on 100% O2 by closed face mask with adequate spontaneous ventilation    Post pain: adequate analgesia    Post assessment: no apparent anesthetic complications and tolerated procedure well    Post vital signs: stable    Level of consciousness: sedated and responds to stimulation    Nausea/Vomiting: no nausea/vomiting    Complications: none    Transfer of care protocol was followed    Last vitals: Visit Vitals  /83   Pulse 84   Temp 36.7 °C (98 °F) (Oral)   Resp 16   Ht 6' 2" (1.88 m)   Wt (!) 158.8 kg (350 lb)   SpO2 97%   BMI 44.94 kg/m²     "

## 2024-03-05 ENCOUNTER — TELEPHONE (OUTPATIENT)
Dept: ORTHOPEDICS | Facility: CLINIC | Age: 59
End: 2024-03-05
Payer: MEDICARE

## 2024-03-08 ENCOUNTER — OFFICE VISIT (OUTPATIENT)
Dept: ORTHOPEDICS | Facility: CLINIC | Age: 59
End: 2024-03-08
Payer: MEDICARE

## 2024-03-08 VITALS — WEIGHT: 315 LBS | HEIGHT: 74 IN | BODY MASS INDEX: 40.43 KG/M2

## 2024-03-08 DIAGNOSIS — Z98.890 POST-OPERATIVE STATE: Primary | ICD-10-CM

## 2024-03-08 PROCEDURE — 99024 POSTOP FOLLOW-UP VISIT: CPT | Mod: POP,,,

## 2024-03-08 PROCEDURE — 99213 OFFICE O/P EST LOW 20 MIN: CPT | Mod: PBBFAC

## 2024-03-08 PROCEDURE — 99999 PR PBB SHADOW E&M-EST. PATIENT-LVL III: CPT | Mod: PBBFAC,,,

## 2024-03-08 RX ORDER — ACETAMINOPHEN 500 MG
500 TABLET ORAL EVERY 6 HOURS PRN
Qty: 20 TABLET | Refills: 0 | Status: SHIPPED | OUTPATIENT
Start: 2024-03-08 | End: 2024-03-18

## 2024-03-08 NOTE — PROGRESS NOTES
Mr. Pandey is here today for a post-operative visit.  He is 2 week status post Left Trigger Finger Release of the long finger by Dr. Garcia on 02/23/2024.. He report pain in left middle finger with swelling.  Pain is 9/10.  He is taking Tylenol for pain control which is providing him some relief. He reports he has been elevating hand.  He denies fever, chills, and sweats since the time of the surgery. He denies any numbness or tingling.     Physical exam:    There were no vitals filed for this visit.  Vital signs are stable, patient is afebrile.  Patient is well dressed and well groomed, no acute distress.  Alert and oriented to person, place, and time.  Post op dressing taken down.  Incision is clean, dry and intact.  There is no erythema or exudate. Mild swelling on proximal phalangeal; Negative tenderness on palpation There is no sign of any infection. He is NVI. Sutures removed without difficulty.    Stiffness noted when making a fist.     Assessment:  status post Left Trigger Finger Release of the long finger    Fausto was seen today for pain and swelling.    Diagnoses and all orders for this visit:    Post-operative state  -     acetaminophen (TYLENOL) 500 MG tablet; Take 1 tablet (500 mg total) by mouth every 6 (six) hours as needed for Pain.  -     Ambulatory referral/consult to Physical/Occupational Therapy; Future         Plan:  There are no diagnoses linked to this encounter.  - PO instruction reviewed and provided to patient  - Educated patient on HEP ROM of the Elbow, Wrist and Hand for 15 minutes.  - Home ROM Exercise sheet provided   -instructed aggressive scar massage.   -recommended hand elevation, ice,  to help with swelling.  - Tylenol and heat as needed for pain.  -due to the stiffness and pain, occupation therapy was ordered.  -He will follow-up in 2 weeks for motion check.  - All questions answered.       Joya Alcantara, NP  [facility]  [facility] [place], [place]    Disclaimer: This  note has been generated using voice-recognition software. There may be typographical errors that have been missed during proof-reading.

## 2024-03-21 ENCOUNTER — TELEPHONE (OUTPATIENT)
Dept: ORTHOPEDICS | Facility: CLINIC | Age: 59
End: 2024-03-21
Payer: MEDICARE

## 2024-04-03 ENCOUNTER — PATIENT MESSAGE (OUTPATIENT)
Dept: INTERNAL MEDICINE | Facility: CLINIC | Age: 59
End: 2024-04-03
Payer: MEDICARE

## 2024-04-03 ENCOUNTER — HOSPITAL ENCOUNTER (EMERGENCY)
Facility: OTHER | Age: 59
Discharge: HOME | End: 2024-04-03
Attending: EMERGENCY MEDICINE
Payer: MEDICARE

## 2024-04-03 VITALS
RESPIRATION RATE: 17 BRPM | TEMPERATURE: 98 F | BODY MASS INDEX: 46.22 KG/M2 | SYSTOLIC BLOOD PRESSURE: 164 MMHG | DIASTOLIC BLOOD PRESSURE: 97 MMHG | HEART RATE: 65 BPM | OXYGEN SATURATION: 95 % | WEIGHT: 315 LBS

## 2024-04-03 DIAGNOSIS — I10 BENIGN ESSENTIAL HTN: ICD-10-CM

## 2024-04-03 DIAGNOSIS — M65.332 TRIGGER MIDDLE FINGER OF LEFT HAND: Primary | ICD-10-CM

## 2024-04-03 DIAGNOSIS — M25.531 RIGHT WRIST PAIN: Primary | ICD-10-CM

## 2024-04-03 PROCEDURE — 25000003 PHARM REV CODE 250

## 2024-04-03 PROCEDURE — 99283 EMERGENCY DEPT VISIT LOW MDM: CPT | Mod: 25

## 2024-04-03 PROCEDURE — 25000003 PHARM REV CODE 250: Performed by: NURSE PRACTITIONER

## 2024-04-03 RX ORDER — ACETAMINOPHEN 325 MG/1
650 TABLET ORAL
Status: COMPLETED | OUTPATIENT
Start: 2024-04-03 | End: 2024-04-03

## 2024-04-03 RX ORDER — HYDROCODONE BITARTRATE AND ACETAMINOPHEN 5; 325 MG/1; MG/1
1 TABLET ORAL EVERY 6 HOURS PRN
Qty: 20 TABLET | Refills: 0 | Status: SHIPPED | OUTPATIENT
Start: 2024-04-03 | End: 2024-04-03

## 2024-04-03 RX ORDER — HYDROCODONE BITARTRATE AND ACETAMINOPHEN 5; 325 MG/1; MG/1
1 TABLET ORAL
Status: COMPLETED | OUTPATIENT
Start: 2024-04-03 | End: 2024-04-03

## 2024-04-03 RX ORDER — HYDROCODONE BITARTRATE AND ACETAMINOPHEN 5; 325 MG/1; MG/1
1 TABLET ORAL EVERY 6 HOURS PRN
Qty: 12 TABLET | Refills: 0 | Status: SHIPPED | OUTPATIENT
Start: 2024-04-03 | End: 2024-04-08

## 2024-04-03 RX ADMIN — HYDROCODONE BITARTRATE AND ACETAMINOPHEN 1 TABLET: 5; 325 TABLET ORAL at 07:04

## 2024-04-03 RX ADMIN — ACETAMINOPHEN 650 MG: 325 TABLET, FILM COATED ORAL at 07:04

## 2024-04-03 NOTE — FIRST PROVIDER EVALUATION
Emergency Department TeleTriage Encounter Note      CHIEF COMPLAINT    Chief Complaint   Patient presents with    Wrist Pain     Reports right wrist pain after IV insertion in February for left hand sx. Pt able to GONZALEZ.       VITAL SIGNS   Initial Vitals [04/03/24 1808]   BP Pulse Resp Temp SpO2   (!) 211/102 79 20 98.2 °F (36.8 °C) 95 %      MAP       --            ALLERGIES    Review of patient's allergies indicates:   Allergen Reactions    Spironolactone Other (See Comments)     Breast tenderness    Ibuprofen Other (See Comments)     Unable to take Ibuprofen due to decreased kidney function       PROVIDER TRIAGE NOTE  This is a teletriage evaluation of a 58 y.o. male presenting to the ED with c/o right wrist pain ongoing following an IV insertion to the area 2/23. No redness drainage or swelling per patient. Xray to r/o radiopaque FB.  Limited physical exam via telehealth: The patient is awake, alert, answering questions appropriately and is not in respiratory distress. Initial orders will be placed and care will be transferred to an alternate provider when patient is roomed for a full evaluation. Any additional orders and the final disposition will be determined by that provider.         ORDERS  Labs Reviewed - No data to display    ED Orders (720h ago, onward)      Start Ordered     Status Ordering Provider    04/03/24 1815 04/03/24 1812  acetaminophen tablet 650 mg  ED 1 Time         Ordered BARRON JEFFERSON    04/03/24 1813 04/03/24 1812  X-Ray Wrist Complete Right  1 time imaging         Ordered BARRON JEFFERSON    04/03/24 1813 04/03/24 1812  Repeat BP  Once         Ordered BARRON JEFFERSON              Virtual Visit Note: The provider triage portion of this emergency department evaluation and documentation was performed via [employer], a HIPAA-compliant telemedicine application, in concert with a tele-presenter in the room. A face to face patient evaluation with one of my colleagues will occur once the  patient is placed in an emergency department room.      DISCLAIMER: This note was prepared with [employer] voice recognition transcription software. Garbled syntax, mangled pronouns, and other bizarre constructions may be attributed to that software system.

## 2024-04-04 ENCOUNTER — TELEPHONE (OUTPATIENT)
Dept: ORTHOPEDICS | Facility: CLINIC | Age: 59
End: 2024-04-04
Payer: MEDICARE

## 2024-04-04 NOTE — ED TRIAGE NOTES
Patient presents to the ED with complaints of having right wrist pain. States pain started during ED visit where IV was placed in the wrist. Denies any fall or hitting right upper extremity.

## 2024-04-04 NOTE — ED PROVIDER NOTES
Encounter Date: 4/3/2024       History     Chief Complaint   Patient presents with    Wrist Pain     Reports right wrist pain after IV insertion in February for left hand sx. Pt able to GONZALEZ.     Fausto Pandey is a 58 y.o. male with history of HTN and CKD presenting to the emergency department for evaluation of right wrist pain since 2/23/24 when an IV was placed at the site for surgery for left trigger finger. Surgery was completed by Dr. Garcia (hand surgery). States that he has been taking Tylenol for his pain, which has not provided relief. He denies fever, chills, swelling or paresthesias.       The history is provided by the patient.     Review of patient's allergies indicates:   Allergen Reactions    Spironolactone Other (See Comments)     Breast tenderness    Ibuprofen Other (See Comments)     Unable to take Ibuprofen due to decreased kidney function     Past Medical History:   Diagnosis Date    Benign essential HTN 8/22/2017    Chronic kidney disease, stage III (moderate) 8/22/2017    Internal derangement of left knee 10/9/2017     Past Surgical History:   Procedure Laterality Date    inguinal hernia surgery      TRIGGER FINGER RELEASE Left 2/23/2024    Procedure: RELEASE, TRIGGER ,LEFT MIDDLE FINGER;  Surgeon: Goldie Garcia MD;  Location: [facility];  Service: Orthopedics;  Laterality: Left;     Family History   Problem Relation Age of Onset    Hypertension Mother     Hypertension Father     Hypertension Brother      Social History     Tobacco Use    Smoking status: Former     Current packs/day: 0.30     Types: Cigarettes    Smokeless tobacco: Never   Substance Use Topics    Alcohol use: Never    Drug use: Yes     Types: Marijuana     Comment: daily     Review of Systems   Constitutional:  Negative for chills and fever.   Musculoskeletal:  Positive for arthralgias (right wrist).   Neurological:  Negative for numbness.       Physical Exam     Initial Vitals [04/03/24 1808]   BP Pulse Resp  Temp SpO2   (!) 211/102 79 20 98.2 °F (36.8 °C) 95 %      MAP       --         Physical Exam    Vitals reviewed.  Constitutional: He appears well-developed and well-nourished. No distress.   HENT:   Head: Normocephalic and atraumatic.   Eyes: Conjunctivae and EOM are normal. Pupils are equal, round, and reactive to light.   Neck: Neck supple.   Normal range of motion.  Musculoskeletal:         General: Normal range of motion.      Cervical back: Normal range of motion and neck supple.      Comments: Pain with but normal ROM of right hand and wrist. TTP of radial aspect of distal right wrist. No edema, erythema, or overlying skin changes. Not hot to touch.      Neurological: He is alert and oriented to person, place, and time. He has normal strength. GCS score is 15. GCS eye subscore is 4. GCS verbal subscore is 5. GCS motor subscore is 6.   Skin: Skin is warm and dry.   Psychiatric: He has a normal mood and affect. His behavior is normal. Judgment and thought content normal.         ED Course   Procedures  Labs Reviewed - No data to display       Imaging Results              X-Ray Wrist Complete Right (Final result)  Result time 04/03/24 18:31:29      Final result by tSar Michael MD (04/03/24 18:31:29)                   Impression:      No acute osseous abnormality identified.      Electronically signed by: Star Michael MD  Date:    04/03/2024  Time:    18:31               Narrative:    EXAMINATION:  XR WRIST COMPLETE 3 VIEWS RIGHT    CLINICAL HISTORY:  Pain in right wrist    TECHNIQUE:  PA, lateral, and oblique views of the right wrist were performed.    COMPARISON:  None    FINDINGS:  No evidence of acute displaced fracture, dislocation, or osseous destructive process.  No radiopaque retained foreign body seen.                                       Medications   acetaminophen tablet 650 mg (650 mg Oral Given 4/3/24 1945)   HYDROcodone-acetaminophen 5-325 mg per tablet 1 tablet (1 tablet Oral Given 4/3/24  1951)     Medical Decision Making  Risk  Prescription drug management.                          Medical Decision Making:   Initial Assessment:   Urgent evaluation of 58-year-old male with history of hypertension and CKD presenting for right wrist pain after an IV was placed at the site surgery for left trigger finger on 02/23/2024.  No relief with Tylenol.  He denies fever, chills, swelling, redness, numbness or tingling.  On exam, he is well-appearing and nontoxic.  Hemodynamically stable.  Afebrile in the ED. Exam notable for pain with but normal ROM of right hand and wrist. TTP of radial aspect of distal right wrist. No edema, erythema, or overlying skin changes. Not hot to touch.  Case was discussed with supervising physician, Dr. River. Recommends [place] 5 and ice, given pt cannot have antiinflammatories due to CKD.  Clinical Tests:   Radiological Study: Ordered and Reviewed  ED Management:  On review of x-rays, no acute fracture or dislocation. I updated the patient on all results. Discharged home with script for Norco 5. Recommended alternation warm compresses and ice. Follow up with hand surgery.  Patient verbalized understanding and agreement with this plan of care. He was given specific return precautions. Advised to follow up with PCP as needed. All questions and concerns addressed. He is stable for discharge.     This note was created with MModal Fluency Direct Dictation. Please excuse any spelling or grammatical errors.             Clinical Impression:  Final diagnoses:  [M25.531] Right wrist pain (Primary)          ED Disposition Condition    Discharge Stable          ED Prescriptions       Medication Sig Dispense Start Date End Date Auth. Provider    HYDROcodone-acetaminophen (NORCO) 5-325 mg per tablet  (Status: Discontinued) Take 1 tablet by mouth every 6 (six) hours as needed for Pain. 20 tablet 4/3/2024 4/3/2024 Jorge Pappas PA-C    HYDROcodone-acetaminophen (NORCO) 5-325 mg per tablet Take 1  tablet by mouth every 6 (six) hours as needed for Pain. 12 tablet 4/3/2024 4/8/2024 Jorge Pappas PA-C          Follow-up Information    None          Jorge Pappas PA-C  04/03/24 [id number]

## 2024-04-04 NOTE — DISCHARGE INSTRUCTIONS
Please take Norco 5 as needed for right wrist pain. Alternate warm compresses and ice packs to your wrist. Avoid heavy lifting or exercise. Follow up with hand surgery for further evaluation and management.

## 2024-04-04 NOTE — TELEPHONE ENCOUNTER
Care Due:                  Date            Visit Type   Department     Provider  --------------------------------------------------------------------------------                                EP -                              PRIMARY      [facility] INTERNAL  Last Visit: 11-      CARE ([facility])   FILOMENA Thornton                               -                              PRIMARY      [facility] INTERNAL  Next Visit: 05-      CARE ([facility])   FILOMENA Thornton                                                            Last  Test          Frequency    Reason                     Performed    Due Date  --------------------------------------------------------------------------------    CMP.........  12 months..  allopurinoL, atorvastatin  06- 06-    Lipid Panel.  12 months..  atorvastatin.............  03- 03-    [facility] Embedded Care Due Messages. Reference number: [id number].   4/04/2024 11:36:57 AM CDT

## 2024-04-04 NOTE — TELEPHONE ENCOUNTER
MARIANNE for pt. Requested a call back to the [facility] at [phone] for assistance scheduling appointment with Dr. Carrillo or Papo DUMONT for his R wrist pain. XR on file.

## 2024-04-08 ENCOUNTER — TELEPHONE (OUTPATIENT)
Dept: ORTHOPEDICS | Facility: CLINIC | Age: 59
End: 2024-04-08
Payer: MEDICARE

## 2024-04-08 NOTE — TELEPHONE ENCOUNTER
Spoke  to patient to get him an appointment and he stated he does not want to come in and hung up the phone    No

## 2024-04-09 ENCOUNTER — PATIENT MESSAGE (OUTPATIENT)
Dept: ADMINISTRATIVE | Facility: HOSPITAL | Age: 59
End: 2024-04-09
Payer: MEDICARE

## 2024-04-09 RX ORDER — AMLODIPINE BESYLATE 10 MG/1
10 TABLET ORAL DAILY
Qty: 90 TABLET | Refills: 3 | Status: SHIPPED | OUTPATIENT
Start: 2024-04-09

## 2024-04-10 DIAGNOSIS — Z12.11 SCREENING FOR COLON CANCER: ICD-10-CM

## 2024-05-20 ENCOUNTER — TELEPHONE (OUTPATIENT)
Dept: ORTHOPEDICS | Facility: CLINIC | Age: 59
End: 2024-05-20
Payer: MEDICARE

## 2024-05-20 ENCOUNTER — OFFICE VISIT (OUTPATIENT)
Dept: INTERNAL MEDICINE | Facility: CLINIC | Age: 59
End: 2024-05-20
Payer: MEDICARE

## 2024-05-20 VITALS
HEART RATE: 84 BPM | WEIGHT: 315 LBS | OXYGEN SATURATION: 98 % | HEIGHT: 74 IN | BODY MASS INDEX: 40.43 KG/M2 | SYSTOLIC BLOOD PRESSURE: 150 MMHG | DIASTOLIC BLOOD PRESSURE: 96 MMHG

## 2024-05-20 DIAGNOSIS — M25.531 CHRONIC PAIN OF RIGHT WRIST: Primary | ICD-10-CM

## 2024-05-20 DIAGNOSIS — M65.831 EXTENSOR TENOSYNOVITIS OF RIGHT WRIST: ICD-10-CM

## 2024-05-20 DIAGNOSIS — I80.8 SUPERFICIAL THROMBOPHLEBITIS OF RIGHT UPPER EXTREMITY: ICD-10-CM

## 2024-05-20 DIAGNOSIS — G89.29 CHRONIC PAIN OF RIGHT WRIST: Primary | ICD-10-CM

## 2024-05-20 PROCEDURE — 99999 PR PBB SHADOW E&M-EST. PATIENT-LVL IV: CPT | Mod: PBBFAC,,, | Performed by: INTERNAL MEDICINE

## 2024-05-20 PROCEDURE — 99214 OFFICE O/P EST MOD 30 MIN: CPT | Mod: PBBFAC | Performed by: INTERNAL MEDICINE

## 2024-05-20 PROCEDURE — 99214 OFFICE O/P EST MOD 30 MIN: CPT | Mod: S$PBB,,, | Performed by: INTERNAL MEDICINE

## 2024-05-20 NOTE — TELEPHONE ENCOUNTER
Spoke c pt. Offered first available 06/11/24. Pt stated he needed to be seen this week and would not accept NP or PA appt. Confirmed appt location & time c Dr. Carrillo 05/21/24. Pt expressed understanding & was thankful.

## 2024-05-20 NOTE — PROGRESS NOTES
Subjective:       Patient ID: Fausto Pandey is a 58 y.o. male.    Chief Complaint: Follow-up and Wrist Pain      HPI  Fausto Pandey is a 58 y.o. year old male with HTN, CKD 3b, gout, L trigger finger s/p release presents for evaluation of right wrist pain which started after his L trigger finger release surgery. States initial injury occurred with IV starting in left hand and has not recovered. States pain is a pulling sensation, 9/10, unable to extend wrist, unable to perform ADLs. Patient very high strung in clinic today, using obscenities which was unlike him. Wants ultrasound to make sure there isn't a blood clot.     Review of Systems   Respiratory:  Negative for shortness of breath.    Musculoskeletal:  Positive for arthralgias and joint swelling.   Psychiatric/Behavioral:  Positive for dysphoric mood.          Past Medical History:   Diagnosis Date    Benign essential HTN 8/22/2017    Chronic kidney disease, stage III (moderate) 8/22/2017    Internal derangement of left knee 10/9/2017        Prior to Admission medications    Medication Sig Start Date End Date Taking? Authorizing Provider   acetaminophen (TYLENOL) 500 MG tablet Take 1 tablet (500 mg total) by mouth every 6 (six) hours as needed for Pain. 6/29/19  Yes Shayna Mitchell MD   allopurinoL (ZYLOPRIM) 100 MG tablet Take 1.5 tablets (150 mg total) by mouth once daily. 8/15/23  Yes Ivan Thornton MD   amLODIPine (NORVASC) 10 MG tablet Take 1 tablet (10 mg total) by mouth once daily. 4/9/24  Yes Ivan Thornton MD   atorvastatin (LIPITOR) 80 MG tablet Take 1 tablet (80 mg total) by mouth once daily. 2/15/23  Yes Ivan Thornton MD   cetirizine (ZYRTEC) 10 MG tablet Take 1 tablet (10 mg total) by mouth 2 (two) times daily. 12/13/18  Yes Enio Sanchez MD   chlorthalidone (HYGROTEN) 25 MG Tab Take 1 tablet (25 mg total) by mouth once daily. 11/16/23 11/15/24 Yes Ivan Thornton MD   cholecalciferol, vitamin D3, (VITAMIN D3) 50 mcg (2,000 unit) Cap  "capsule Take 1 capsule by mouth once daily.   Yes Provider, Historical   hydrALAZINE (APRESOLINE) 100 MG tablet Take 1 tablet (100 mg total) by mouth every 8 (eight) hours. 8/15/23 8/14/24 Yes Ivan Thornton MD   metoprolol succinate (TOPROL-XL) 25 MG 24 hr tablet Take 1 tablet (25 mg total) by mouth once daily. 7/26/23  Yes Ana M Pollard NP   olmesartan (BENICAR) 40 MG tablet TAKE 1 TABLET(40 MG) BY MOUTH EVERY DAY 6/28/23  Yes Ana M Pollard NP   colchicine, gout, (COLCRYS) 0.6 mg tablet Take 1 tablet (0.6 mg total) by mouth every other day. 8/15/23 2/23/24  Ivan Thornton MD   methocarbamoL (ROBAXIN) 750 MG Tab Take 1 tablet (750 mg total) by mouth 3 (three) times daily as needed (pain).  Patient not taking: Reported on 5/20/2024 4/28/22   Ivan Thornton MD        Past medical history, surgical history, and family medical history reviewed and updated as appropriate.    Medications and allergies reviewed.     Objective:          Vitals:    05/20/24 1128   BP: (!) 150/96   BP Location: Left arm   Patient Position: Sitting   BP Method: Large (Manual)   Pulse: 84   SpO2: 98%   Weight: (!) 166.3 kg (366 lb 10 oz)   Height: 6' 2" (1.88 m)     Body mass index is 47.07 kg/m².  Physical Exam  Constitutional:       Appearance: Normal appearance. He is obese.   Eyes:      Extraocular Movements: Extraocular movements intact.   Cardiovascular:      Rate and Rhythm: Normal rate.   Pulmonary:      Effort: Pulmonary effort is normal. No respiratory distress.   Musculoskeletal:         General: Swelling (minimal) and tenderness (minimal) present. No deformity.      Comments: Cap refill brisk   Skin:     Comments: No erythema, pain on palpation, tenderness over right wrist.     ROM right hand normal. Flexion of right wrist normal.     Thickened skin with central excoriation over right elbow. Erythema noted, minimal pain with palpation.      Neurological:      Mental Status: He is alert and oriented to person, place, and time. " "Mental status is at baseline.         Lab Results   Component Value Date    WBC 6.14 09/05/2023    HGB 13.6 (L) 09/05/2023    HCT 40.9 09/05/2023     09/05/2023    CHOL 212 (H) 03/31/2023    TRIG 314 (H) 03/31/2023    HDL 25 (L) 03/31/2023    ALT 22 06/27/2023    AST 21 06/27/2023     09/05/2023     09/05/2023    K 3.8 09/05/2023    K 3.8 09/05/2023     09/05/2023     09/05/2023    CREATININE 2.5 (H) 09/05/2023    CREATININE 2.5 (H) 09/05/2023    BUN 26 (H) 09/05/2023    BUN 26 (H) 09/05/2023    CO2 26 09/05/2023    CO2 26 09/05/2023    TSH 1.709 10/20/2020    PSA 0.85 03/31/2023    HGBA1C 4.9 02/15/2022       Assessment:       1. Chronic pain of right wrist    2. Extensor tenosynovitis of right wrist    3. Superficial thrombophlebitis of right upper extremity          Plan:     Fausto was seen today for follow-up and wrist pain.    Diagnoses and all orders for this visit:    Chronic pain of right wrist  Comments:  steady pain with "pulling" sensation, constant, no worse in morning or night. has been wearing thumb splint and ace wrap to right wrist.  Orders:  -     URIC ACID; Future  -     CBC W/ AUTO DIFFERENTIAL; Future  -     COMPREHENSIVE METABOLIC PANEL; Future  -     Sedimentation rate; Future  -     C-REACTIVE PROTEIN; Future  -     Ambulatory referral/consult to Hand Surgery; Future    Extensor tenosynovitis of right wrist  Comments:  favor this diagnosis, will refer to hand surgery for diagnostics / evaluation.    Superficial thrombophlebitis of right upper extremity  Comments:  will obtain ultrasound, pt concerned of wrist pain after IV site insertion to right wrist. No palpable cording, no erythema warmth      Patient left clinic before visit was completed.         Discussed plan with patient, plan to get labwork, ultrasound and hand surgery referral. "I ain't fucking going back there, they're the ones that did this to me." Calmly explained to patient that if injury occurred " "perioperatively, that this has nothing to do with the hand surgery clinic providers. "I guess I'm going to have to get my , I'm going to call them after this." "90 days I've been hurting, I told them that they hurt me when they put the IV in, instead they just knocked me out." Once again, reinforced plan to get labwork, ultrasound and referral to hand clinic for wrist pain. Patient ended clinic appointment abruptly. Walked out.     Notified nursing supervisor (Judith Corcoran RN).     Health maintenance reviewed with patient.     Follow up if symptoms worsen or fail to improve.    Ivan Thornton MD  Internal Medicine / Primary Care  [facility]  5/20/2024    "

## 2024-05-21 ENCOUNTER — OFFICE VISIT (OUTPATIENT)
Dept: ORTHOPEDICS | Facility: CLINIC | Age: 59
End: 2024-05-21
Payer: MEDICARE

## 2024-05-21 VITALS — HEIGHT: 74 IN | WEIGHT: 315 LBS | BODY MASS INDEX: 40.43 KG/M2

## 2024-05-21 DIAGNOSIS — M25.531 CHRONIC PAIN OF RIGHT WRIST: ICD-10-CM

## 2024-05-21 DIAGNOSIS — G89.29 CHRONIC PAIN OF RIGHT WRIST: ICD-10-CM

## 2024-05-21 DIAGNOSIS — M65.4 DE QUERVAIN'S TENOSYNOVITIS, RIGHT: Primary | ICD-10-CM

## 2024-05-21 PROCEDURE — 99999PBSHW PR PBB SHADOW TECHNICAL ONLY FILED TO HB: Mod: PBBFAC,,,

## 2024-05-21 PROCEDURE — 20550 NJX 1 TENDON SHEATH/LIGAMENT: CPT | Mod: PBBFAC,RT | Performed by: ORTHOPAEDIC SURGERY

## 2024-05-21 PROCEDURE — 99999 PR PBB SHADOW E&M-EST. PATIENT-LVL IV: CPT | Mod: PBBFAC,,, | Performed by: ORTHOPAEDIC SURGERY

## 2024-05-21 PROCEDURE — 99214 OFFICE O/P EST MOD 30 MIN: CPT | Mod: PBBFAC,25 | Performed by: ORTHOPAEDIC SURGERY

## 2024-05-21 PROCEDURE — 99214 OFFICE O/P EST MOD 30 MIN: CPT | Mod: 25,S$PBB,, | Performed by: ORTHOPAEDIC SURGERY

## 2024-05-21 RX ADMIN — DEXAMETHASONE SODIUM PHOSPHATE 4 MG: 4 INJECTION INTRA-ARTICULAR; INTRALESIONAL; INTRAMUSCULAR; INTRAVENOUS; SOFT TISSUE at 09:05

## 2024-05-21 NOTE — PROGRESS NOTES
[facility]  History & Physical  Orthopedics    SUBJECTIVE:      Chief Complaint: right wrist pain    History of Present Illness:  Patient is a 58 y.o. right hand dominant male who presents today with complaints of right wrist pain.  The patient had a left long finger trigger finger release on February 23rd of this year.  He states he had an IV placed into his right wrist for that procedure.  Since that time, he has complained of right wrist pain located over the radial aspect of the wrist.  He states he has pain with any movement of the thumb or wrist and is able to complete his ADLs due to the pain.  He has a thumb spica brace and has mild-to-moderate relief in the pain with wearing the brace.  He denies numbness and tingling in the right upper extremity.  Regarding his trigger finger, he states his symptoms have resolved due to the surgery.    Onset of symptoms/DOI was February 23, 2024.    Symptoms are aggravated by movement.    Symptoms are alleviated by immobilization.    Symptoms consist of pain and decreased ROM.    The patient rates their pain as a 7/10.    Attempted treatment(s) and/or interventions include activity modifications, rest, immobilization.     The patient denies any fevers, chills, N/V, D/C and presents for evaluation.       Past Medical History:   Diagnosis Date    Benign essential HTN 8/22/2017    Chronic kidney disease, stage III (moderate) 8/22/2017    Internal derangement of left knee 10/9/2017     Past Surgical History:   Procedure Laterality Date    inguinal hernia surgery      TRIGGER FINGER RELEASE Left 2/23/2024    Procedure: RELEASE, TRIGGER ,LEFT MIDDLE FINGER;  Surgeon: Goldie Garcia MD;  Location: [facility];  Service: Orthopedics;  Laterality: Left;     Review of patient's allergies indicates:   Allergen Reactions    Spironolactone Other (See Comments)     Breast tenderness    Ibuprofen Other (See Comments)     Unable to take Ibuprofen due to decreased  "kidney function     Social History     Social History Narrative    Not on file     Family History   Problem Relation Name Age of Onset    Hypertension Mother      Hypertension Father      Hypertension Brother           Current Outpatient Medications:     acetaminophen (TYLENOL) 500 MG tablet, Take 1 tablet (500 mg total) by mouth every 6 (six) hours as needed for Pain., Disp: 30 tablet, Rfl: 0    allopurinoL (ZYLOPRIM) 100 MG tablet, Take 1.5 tablets (150 mg total) by mouth once daily., Disp: 135 tablet, Rfl: 3    amLODIPine (NORVASC) 10 MG tablet, Take 1 tablet (10 mg total) by mouth once daily., Disp: 90 tablet, Rfl: 3    atorvastatin (LIPITOR) 80 MG tablet, Take 1 tablet (80 mg total) by mouth once daily., Disp: 90 tablet, Rfl: 3    cetirizine (ZYRTEC) 10 MG tablet, Take 1 tablet (10 mg total) by mouth 2 (two) times daily., Disp: 10 tablet, Rfl: 0    chlorthalidone (HYGROTEN) 25 MG Tab, Take 1 tablet (25 mg total) by mouth once daily., Disp: 90 tablet, Rfl: 3    cholecalciferol, vitamin D3, (VITAMIN D3) 50 mcg (2,000 unit) Cap capsule, Take 1 capsule by mouth once daily., Disp: , Rfl:     hydrALAZINE (APRESOLINE) 100 MG tablet, Take 1 tablet (100 mg total) by mouth every 8 (eight) hours., Disp: 270 tablet, Rfl: 3    methocarbamoL (ROBAXIN) 750 MG Tab, Take 1 tablet (750 mg total) by mouth 3 (three) times daily as needed (pain)., Disp: 21 tablet, Rfl: 0    metoprolol succinate (TOPROL-XL) 25 MG 24 hr tablet, Take 1 tablet (25 mg total) by mouth once daily., Disp: 90 tablet, Rfl: 3    olmesartan (BENICAR) 40 MG tablet, TAKE 1 TABLET(40 MG) BY MOUTH EVERY DAY, Disp: 90 tablet, Rfl: 3    colchicine, gout, (COLCRYS) 0.6 mg tablet, Take 1 tablet (0.6 mg total) by mouth every other day., Disp: 15 tablet, Rfl: 2      Review of Systems:  As per HPI otherwise noncontributory    OBJECTIVE:      Vital Signs (Most Recent):  Vitals:    05/21/24 0911   Weight: (!) 166.3 kg (366 lb 10 oz)   Height: 6' 2" (1.88 m)     Body mass " index is 47.07 kg/m².      Physical Exam:  Constitutional: The patient appears well-developed and well-nourished. No distress.   Skin: No lesions appreciated  Head: Normocephalic and atraumatic.   Nose: Nose normal.   Ears: No deformities seen  Eyes: Conjunctivae and EOM are normal.   Neck: No tracheal deviation present.   Cardiovascular: Normal rate and intact distal pulses.    Pulmonary/Chest: Effort normal. No respiratory distress.   Abdominal: There is no guarding.   Neurological: The patient is alert.   Psychiatric: The patient has a normal mood and affect.     Right Hand/Wrist Examination:    Observation/Inspection:  Swelling  none    Deformity  none  Discoloration  none     Scars   none    Atrophy  none    HAND/WRIST EXAMINATION:  Finkelstein's Test   Positive  WHAT Test    Positive  Snuff box tenderness   Neg  Vicente's Test    Neg  Hook of Hamate Tenderness  Neg  CMC grind    Neg  Circumduction test   Neg    Neurovascular Exam:  Digits WWP, brisk CR < 3s throughout  NVI motor/LTS to M/R/U nerves, radial pulse 2+  Tinel's Test - Carpal Tunnel  Neg  Tinel's Test - Cubital Tunnel  Neg  Phalen's Test    Neg  Median Nerve Compression Test Neg    ROM hand full, painless    ROM wrist full, painless except for the above test.  Moderate tenderness to palpation along the 1st dorsal compartment.    ROM elbow full, painless    Abdomen not guarded  Respirations nonlabored  Perfusion intact    Diagnostic Results:     Imaging - I independently viewed the patient's imaging as well as the radiology report.  Xrays of the patient's right wrist demonstrate no evidence of any acute fractures or dislocations or significant degenerative changes.    ASSESSMENT/PLAN:      58 y.o. yo male with right de Quervain tenosynovitis possibly initiated from an IV placement back in February.  After discussion, we would like to proceed with a corticosteroid injection which was administered without difficulty.  The patient will also wear a thumb  spica brace for additional immobilization at night.  I have also placed a referral to hand therapy for further treatment.  The patient will follow up back here in a few weeks to make sure that his pain is improving.

## 2024-05-21 NOTE — PROCEDURES
Tendon Sheath    Date/Time: 5/21/2024 9:00 AM    Performed by: Goldie Garcia MD  Authorized by: Goldie Garcia MD    Consent Done?:  Yes (Verbal)  Indications:  Pain  Timeout: prior to procedure the correct patient, procedure, and site was verified    Local anesthesia used?: Yes    Anesthesia:  Local infiltration  Local anesthetic:  Lidocaine 1% without epinephrine  Location:  Wrist  Site:  R first doral compartment  Needle size:  25 G  Medications:  4 mg dexAMETHasone 4 mg/mL

## 2024-05-23 ENCOUNTER — CLINICAL SUPPORT (OUTPATIENT)
Dept: REHABILITATION | Facility: HOSPITAL | Age: 59
End: 2024-05-23
Payer: MEDICARE

## 2024-05-23 DIAGNOSIS — M65.4 DE QUERVAIN'S TENOSYNOVITIS, RIGHT: ICD-10-CM

## 2024-05-23 PROCEDURE — 97018 PARAFFIN BATH THERAPY: CPT

## 2024-05-23 PROCEDURE — 97165 OT EVAL LOW COMPLEX 30 MIN: CPT

## 2024-05-23 PROCEDURE — 97530 THERAPEUTIC ACTIVITIES: CPT

## 2024-05-23 NOTE — PATIENT INSTRUCTIONS
"[facility] - OCCUPATIONAL THERAPY  HOME EXERCISE PROGRAM   Soak hand/wrist in hot water, use hot wet compress or run hand/wrist   Under hot water for 5-10 min in the morning or before the exercises to   Improve flexibility and decrease stiffness.     Cold pack x 10 min OR ice massage x 5 min at night to wrist to decrease   Pain, inflammation, swelling.     GOAL: MOIST HEAT, STRETCH, ICE, REST     Brace or ace wrap/tape with activity, ok to remove for light activity  Bathing, hygiene.   Complete the following exercises with 10 repetitions each, 3-4x/day.     AROM: Thumb IP Flexion / Extension  Brace thumb below tip joint. Bend joint as far as possible then straighten.    AROM: Thumb Composite Flexion   Bend both joints of thumb as far as possible. Try to touch base of little finger.    AROM: Radial Adduction / Abduction  Place your palm flat on the table. Move thumb out to side. Move back alongside index finger.                                    AROM: Composite Movement Circumduction  Make clockwise circles with thumb. Reverse and make counterclockwise circles with thumb.                AROM: Composite Flesion ("Pinky Slides")  Touch thumb to tip of small finger. Slide thumb down small finger into palm.       AROM: Wrist Flexion / Extension               Bend your wrist forward and back as far as possible.      AROM: Wrist Radial / Ulnar Deviation  Bend your wrist from side to side as far as possible.             AROM: Wrist Radial / Ulnar Deviation   Make a fist then bend your wrist toward your body, then away.         Copyright © I. All rights reserved.     Therapist: TAMMI Garcia CHT          Copyright © [facility]. All rights reserved.     "

## 2024-05-28 ENCOUNTER — TELEPHONE (OUTPATIENT)
Dept: ORTHOPEDICS | Facility: CLINIC | Age: 59
End: 2024-05-28
Payer: MEDICARE

## 2024-05-28 NOTE — PLAN OF CARE
[facility]  Occupational Therapy Initial Evaluation     Name: Fausto Pandey  Clinic Number: [id number]    Therapy Diagnosis: No diagnosis found.    Medical Diagnosis: R dequervain's tenosynovitis   ICD-10: M65.4 (ICD-10-CM) - De Quervain's tenosynovitis, right     Physician: Faisal Brand MD; Dr. SUSAN Bucio   Physician Orders: Eval and Treat    Surgical Procedure and Date: n/a, n/a    Evaluation Date: 5/23/2024    Plan of Care Certification Period: 8/23/24    Date of Return to MD: 6/4/24    Visit # / Visits authorized: 1 / 12  Insurance Authorization Period Expiration: PENDING     FOTO #1: FSM__49___ / GOAL __65___  FOTO #2:   FOTO #3:     Precautions:  Standard    Time In:9  Time Out: 945  Total Appointment Time (timed & untimed codes): 45 minutes    Subjective     Date of Onset:   2/23/24    History of Current Condition/Mechanism of Injury: Physician reports: Patient is a 58 y.o. right hand dominant male who presents today with complaints of right wrist pain.  The patient had a left long finger trigger finger release on February 23rd of this year.  He states he had an IV placed into his right wrist for that procedure.  Since that time, he has complained of right wrist pain located over the radial aspect of the wrist.  He states he has pain with any movement of the thumb or wrist and is able to complete his ADLs due to the pain.  He has a thumb spica brace and has mild-to-moderate relief in the pain with wearing the brace.  He denies numbness and tingling in the right upper extremity.  Regarding his trigger finger, he states his symptoms have resolved due to the surgery.  Patient reports he received injection at last MD visit        Falls: none     Involved Side: right    Dominant Side: Right  Imaging: See Chart   Prior Therapy: none     Pain:  Functional Pain Scale Rating 0-10:   8/10 on average  1/10 at best  10/10 at worst  Location: radial wrist   Description: pulling  radiating pain , shocking pain   Aggravating Factors: positional   Easing Factors: brace helps , ace wrap, ice     Occupation:  not working   Working presently: n/a   Duties: n/a     Functional Limitations/Social History:    Previous functional status includes: Independent with all ADLs.     Current Functional Status   Home/Living environment: lives with their family           Limitation of Functional Status as follows:   ADLs/IADLs:     - Feeding: limited R hand use     - Bathing: limited R hand use     - Dressing/Grooming: limited for toileting,     - Home Management: limited R hand use lifting, carrying .     - Driving: limited R hand use, using Left hand     - Gripping/pinching: limited R hand use for gripping, turning, opening containers      Leisure: work in yard, going to bathroom(?)    Patient's Goals for Therapy: Pain relief    Past Medical History/Physical Systems Review:   Fausto Pandey  has a past medical history of Benign essential HTN, Chronic kidney disease, stage III (moderate), and Internal derangement of left knee.    Fausto Pandey  has a past surgical history that includes inguinal hernia surgery and Trigger finger release (Left, 2/23/2024).    Fausto has a current medication list which includes the following prescription(s): acetaminophen, allopurinol, amlodipine, atorvastatin, cetirizine, chlorthalidone, cholecalciferol (vitamin d3), colchicine, hydralazine, methocarbamol, metoprolol succinate, and olmesartan.    Review of patient's allergies indicates:   Allergen Reactions    Spironolactone Other (See Comments)     Breast tenderness    Ibuprofen Other (See Comments)     Unable to take Ibuprofen due to decreased kidney function        Objective     Observation/Appearance:  no deformities noted .    Edema. Measured in centimeters.        Hand ROM. Measured in degrees.   5/23/2024       Wrist ext/flex  45/70   RD/UD  8/30       Thumb: MP 0/50                IP 0/35       Rad ADD/ABD  20       Pal ADD/ABD 20          Opposition full   *Pain with RD    Sensation- intact     Special Tests:   +finkelsteing      Strength (Dyanmometer) and Pinch Strength (Pinch Gauge)  Measured in pounds and psi. Average of three trials.   5/23/2024 5/23/2024    Right  Left    Rung II     Sunshine Pinch 12 20   3pt Pinch 10 18   2pt Pinch 15 14       Manual Muscle Testing:        CMS Impairment/Limitation/Restriction for FOTO Survey    Therapist reviewed FOTO scores for Fausto Pandey on 5/23/2024.   FOTO documents entered into [employer] - see Media section.    Limitation Score: ___49___%      Goal: ___65___%  Category: Self Care       Treatment     Total Treatment time separate from Evaluation time:25    Fausto received the treatments listed below:      Supervised Modalities after being cleared for contradictions: Paraffin      Therapeutic Activities to improve functional performance for 15  minutes, including:  -blocking FPL, thumb flexion, opposition, ab/ad   - wrist flex, ext, RD, UD x 10 reps 3-4 x daily   - modality use for pain, stiffness and swelling including MH, ice massage nightly   - brace use with activity   - goal MH application, stretch, ice, rest; avoid aggravating activities.     Patient Education and Home Exercises      Education provided:   -role of OT, goals for OT, scheduling/cancellations, insurance limitations with patient.  -Additional Education provided: YES    Written Home Exercises Provided: yes.  Exercises were reviewed and Fausto was able to demonstrate them prior to the end of the session.    Fausto demonstrated good  understanding of the education provided.     Pt was advised to perform these exercises free of pain, and to stop performing them if pain occurs.    See EMR under Patient Instructions for exercises provided 5/23/2024.    Assessment     Fausto Pandey is a 58 y.o. male referred to outpatient occupational therapy and presents with a medical diagnosis of Dequervain's  tenosynovitis, right .      Assessment of Occupational Performance   Performance Deficits    Physical:  Joint Mobility  Muscle Power/Strength  Muscle Endurance  Edema  Pinch Strength  Fine Motor Coordination  Pain    Cognitive:  NONE     Psychosocial:    HABITS   ROUTINES      Following medical record review it is determined that pt will benefit from occupational therapy services in order to maximize pain free and/or functional use of right hand. The following goals were discussed with the patient and patient is in agreement with them as to be addressed in the treatment plan. The patient's rehab potential is Good.     Anticipated barriers to occupational therapy: none      Plan of care discussed with patient: Yes   Patient's spiritual, cultural and educational needs considered and patient is agreeable to the plan of care and goals as stated below:     Medical Necessity is demonstrated by the following  Occupational Profile/History  Co-morbidities and personal factors that may impact the plan of care [x] LOW: Brief chart review  [] MODERATE: Expanded chart review   [] HIGH: Extensive chart review    Moderate / High Support Documentation: NONE      Examination  Performance deficits relating to physical, cognitive or psychosocial skills that result in activity limitations and/or participation restrictions  [x] LOW: addressing 1-3 Performance deficits  [] MODERATE: 3-5 Performance deficits  [] HIGH: 5+ Performance deficits (please support below)    Moderate / High Support Documentation: NONE      Treatment Options [x] LOW: Limited options  [] MODERATE: Several options  [] HIGH: Multiple options      Decision Making/ Complexity Score: Low Complexity        The following goals were discussed with the patient and patient is in agreement with them as to be addressed in the treatment plan.     Short Term Goals: (6 weeks):  1)  Patient to be IND with HEP and modalities for pain management  2)  Increase ROM 3-10 degrees to  increase functional hand use for ADLs/work/leisure activities  3)   Decrease edema .1-.5mm to increase joint mobility /flexibility for functional hand use.   4)  Assess   and pinch and set goals accordingly      Long Term Goals :To be met by discharge (12 weeks)   1)  Increase  strength 2-8 lbs. For driving, lifting, carrying   2) Increase pinch strength 1-4 psi's for writing, typing, texting   3)   Increase ROM 11-20 degrees to increase functional hand use for ADLs/work/leisure activities  4) Patient to score at __60___% OR MORE (FSM)  on FOTO to demonstrate improved perception of functional right  hand  Use.    Plan   Certification Period/Plan of care expiration: 5/23/2024 to 8/23/24.    Outpatient Occupational Therapy 1-2 times weekly for 6-12 weeks to include the following interventions: Paraffin, Fluidotherapy, Manual therapy/joint mobilizations, Modalities for pain management, US 3 mhz, Therapeutic exercises/activities., Strengthening, Orthotic Fabrication/Fit/Training, Edema Control, and pain management.    I certify the need for these services furnished under the plan of treatment and while under my care.     ____________________________________________________________________  Physician/Referring Practitioner   Date of Signature       Shannan Cook OT, CHT

## 2024-05-28 NOTE — TELEPHONE ENCOUNTER
LVM c pt. Attempting to confirm appt location & time c Dr. Carrillo Requested a call back to the [facility] at [phone] with any questions, concerns or need for a different appointment time.

## 2024-06-04 ENCOUNTER — OFFICE VISIT (OUTPATIENT)
Dept: ORTHOPEDICS | Facility: CLINIC | Age: 59
End: 2024-06-04
Payer: MEDICARE

## 2024-06-04 VITALS — WEIGHT: 315 LBS | HEIGHT: 74 IN | BODY MASS INDEX: 40.43 KG/M2

## 2024-06-04 DIAGNOSIS — M65.4 DE QUERVAIN'S TENOSYNOVITIS, RIGHT: ICD-10-CM

## 2024-06-04 DIAGNOSIS — M25.531 RIGHT WRIST PAIN: Primary | ICD-10-CM

## 2024-06-04 PROCEDURE — 99213 OFFICE O/P EST LOW 20 MIN: CPT | Mod: PBBFAC | Performed by: ORTHOPAEDIC SURGERY

## 2024-06-04 PROCEDURE — 99204 OFFICE O/P NEW MOD 45 MIN: CPT | Mod: S$PBB,,, | Performed by: ORTHOPAEDIC SURGERY

## 2024-06-04 PROCEDURE — 99999 PR PBB SHADOW E&M-EST. PATIENT-LVL III: CPT | Mod: PBBFAC,,, | Performed by: ORTHOPAEDIC SURGERY

## 2024-06-04 NOTE — PROGRESS NOTES
[facility]  History & Physical  Orthopedics    SUBJECTIVE:      Chief Complaint: right wrist pain   HPI interval 06/04/2024   Patient is here for follow-up right wrist pain/ right wrist de Quervain tenosynovitis following IV placement February 23rd.  Had a CSI on 5/2024 and reports mild improvement . Continues to have pain on movement with flexion and extension of wrist.  Denies any numbness or tingling.  Reports he has been using his thumb spica every night and also going to therapy.       History of Present Illness:  Patient is a 58 y.o. right hand dominant male who presents today with complaints of right wrist pain.  The patient had a left long finger trigger finger release on February 23rd of this year.  He states he had an IV placed into his right wrist for that procedure.  Since that time, he has complained of right wrist pain located over the radial aspect of the wrist.  He states he has pain with any movement of the thumb or wrist and is able to complete his ADLs due to the pain.  He has a thumb spica brace and has mild-to-moderate relief in the pain with wearing the brace.  He denies numbness and tingling in the right upper extremity.  Regarding his trigger finger, he states his symptoms have resolved due to the surgery.    Onset of symptoms/DOI was February 23, 2024.    Symptoms are aggravated by movement.    Symptoms are alleviated by immobilization.    Symptoms consist of pain and decreased ROM.    The patient rates their pain as a 7/10.    Attempted treatment(s) and/or interventions include activity modifications, rest, immobilization.     The patient denies any fevers, chills, N/V, D/C and presents for evaluation.       Past Medical History:   Diagnosis Date    Benign essential HTN 8/22/2017    Chronic kidney disease, stage III (moderate) 8/22/2017    Internal derangement of left knee 10/9/2017     Past Surgical History:   Procedure Laterality Date    inguinal hernia surgery       TRIGGER FINGER RELEASE Left 2/23/2024    Procedure: RELEASE, TRIGGER ,LEFT MIDDLE FINGER;  Surgeon: Goldie Garcia MD;  Location: [facility];  Service: Orthopedics;  Laterality: Left;     Review of patient's allergies indicates:   Allergen Reactions    Spironolactone Other (See Comments)     Breast tenderness    Ibuprofen Other (See Comments)     Unable to take Ibuprofen due to decreased kidney function     Social History     Social History Narrative    Not on file     Family History   Problem Relation Name Age of Onset    Hypertension Mother      Hypertension Father      Hypertension Brother           Current Outpatient Medications:     acetaminophen (TYLENOL) 500 MG tablet, Take 1 tablet (500 mg total) by mouth every 6 (six) hours as needed for Pain., Disp: 30 tablet, Rfl: 0    allopurinoL (ZYLOPRIM) 100 MG tablet, Take 1.5 tablets (150 mg total) by mouth once daily., Disp: 135 tablet, Rfl: 3    amLODIPine (NORVASC) 10 MG tablet, Take 1 tablet (10 mg total) by mouth once daily., Disp: 90 tablet, Rfl: 3    atorvastatin (LIPITOR) 80 MG tablet, Take 1 tablet (80 mg total) by mouth once daily., Disp: 90 tablet, Rfl: 3    cetirizine (ZYRTEC) 10 MG tablet, Take 1 tablet (10 mg total) by mouth 2 (two) times daily., Disp: 10 tablet, Rfl: 0    chlorthalidone (HYGROTEN) 25 MG Tab, Take 1 tablet (25 mg total) by mouth once daily., Disp: 90 tablet, Rfl: 3    cholecalciferol, vitamin D3, (VITAMIN D3) 50 mcg (2,000 unit) Cap capsule, Take 1 capsule by mouth once daily., Disp: , Rfl:     hydrALAZINE (APRESOLINE) 100 MG tablet, Take 1 tablet (100 mg total) by mouth every 8 (eight) hours., Disp: 270 tablet, Rfl: 3    methocarbamoL (ROBAXIN) 750 MG Tab, Take 1 tablet (750 mg total) by mouth 3 (three) times daily as needed (pain)., Disp: 21 tablet, Rfl: 0    metoprolol succinate (TOPROL-XL) 25 MG 24 hr tablet, Take 1 tablet (25 mg total) by mouth once daily., Disp: 90 tablet, Rfl: 3    olmesartan (BENICAR) 40 MG tablet, TAKE 1  "TABLET(40 MG) BY MOUTH EVERY DAY, Disp: 90 tablet, Rfl: 3    colchicine, gout, (COLCRYS) 0.6 mg tablet, Take 1 tablet (0.6 mg total) by mouth every other day., Disp: 15 tablet, Rfl: 2      Review of Systems:  As per HPI otherwise noncontributory    OBJECTIVE:      Vital Signs (Most Recent):  Vitals:    06/04/24 1549   Weight: (!) 166.3 kg (366 lb 10 oz)   Height: 6' 2" (1.88 m)     Body mass index is 47.07 kg/m².      Physical Exam:  Constitutional: The patient appears well-developed and well-nourished. No distress.   Skin: No lesions appreciated  Head: Normocephalic and atraumatic.   Nose: Nose normal.   Ears: No deformities seen  Eyes: Conjunctivae and EOM are normal.   Neck: No tracheal deviation present.   Cardiovascular: Normal rate and intact distal pulses.    Pulmonary/Chest: Effort normal. No respiratory distress.   Abdominal: There is no guarding.   Neurological: The patient is alert.   Psychiatric: The patient has a normal mood and affect.     Right Hand/Wrist Examination:    Observation/Inspection:  Swelling  none    Deformity  none  Discoloration  none     Scars   none    Atrophy  none    HAND/WRIST EXAMINATION:  Finkelstein's Test   Positive  WHAT Test    Positive  Snuff box tenderness   Neg  Vicente's Test    Neg  Hook of Hamate Tenderness  Neg  CMC grind    Neg  Circumduction test   Neg    Neurovascular Exam:  Digits WWP, brisk CR < 3s throughout  NVI motor/LTS to M/R/U nerves, radial pulse 2+  Tinel's Test - Carpal Tunnel  Neg  Tinel's Test - Cubital Tunnel  Neg  Phalen's Test    Neg  Median Nerve Compression Test Neg    ROM hand full, painless    ROM wrist full, painless except for the above test.  Moderate tenderness to palpation along the 1st dorsal compartment.    ROM elbow full, painless    Abdomen not guarded  Respirations nonlabored  Perfusion intact    Diagnostic Results:   XRAY right 04/03/2024  FINDINGS:  No evidence of acute displaced fracture, dislocation, or osseous destructive process.  " No radiopaque retained foreign body seen.     Impression:     No acute osseous abnormality identified.              Imaging - I independently viewed the patient's imaging as well as the radiology report.  Xrays of the patient's right wrist demonstrate no evidence of any acute fractures or dislocations or significant degenerative changes.    ASSESSMENT/PLAN:      Fausto was seen today for swelling and sore.    Diagnoses and all orders for this visit:    Right wrist pain  -     MRI Wrist Joint Without Contrast Right; Future    De Quervain's tenosynovitis, right       58 y.o. yo male with right de Quervain tenosynovitis possibly initiated from an IV placement back in February.  After long discussion we will order MRI to further evaluate right wrist.   He will follow-up  following his MRI to discuss further intervention.

## 2024-06-07 ENCOUNTER — CLINICAL SUPPORT (OUTPATIENT)
Dept: REHABILITATION | Facility: HOSPITAL | Age: 59
End: 2024-06-07
Payer: MEDICARE

## 2024-06-07 DIAGNOSIS — M25.531 RIGHT WRIST PAIN: Primary | ICD-10-CM

## 2024-06-07 PROCEDURE — 97530 THERAPEUTIC ACTIVITIES: CPT

## 2024-06-07 PROCEDURE — 97140 MANUAL THERAPY 1/> REGIONS: CPT

## 2024-06-07 PROCEDURE — 97018 PARAFFIN BATH THERAPY: CPT

## 2024-06-07 NOTE — PROGRESS NOTES
"[facility]  Occupational Therapy Treatment Note     Date: 6/7/2024  Name: Fausto Pandey  Clinic Number: [id number]    Therapy Diagnosis:   Encounter Diagnosis   Name Primary?    Right wrist pain Yes     Physician: Faisal Brand MD  Medical Diagnosis: R dequervain's tenosynovitis   ICD-10: M65.4 (ICD-10-CM) - De Quervain's tenosynovitis, right      Physician: Faisal Brand MD; Dr. SUSAN Bucio   Physician Orders: Eval and Treat     Surgical Procedure and Date: n/a, n/a     Evaluation Date: 5/23/2024     Plan of Care Certification Period: 8/23/24     Date of Return to MD: 6/4/24     Visit # / Visits authorized: 1 / 12  Insurance Authorization Period Expiration: PENDING      FOTO #1: FSM__49___ / GOAL __65___  FOTO #2:   FOTO #3:      Precautions:  Standard     Time In:9  Time Out: 945  Total Appointment Time (timed & untimed codes): 45 minutes         Subjective     Patient reports: Its doing ok, but it still hurts.   He was compliant with home exercise program given last session.     Pain: 3/10  Location: right wrists      Objective     Objective Measures updated at progress report unless specified.    Treatment     Fausto received the treatments listed below:        manual therapy techniques: x 10 min  Pt received tool-assisted massage to R wrist 1st dorsal compartment x 10 minutes to trigger an inflammatory healing response and stimulate the production of new collagen and proper, more functional, less painful healing.     therapeutic activities to improve functional performance for 23  minutes, including: while wearing KT tape   -Flex bar red: flexion, extension, UD, RD with thumb adducted x 15 reps , 2 sets  -Juxtaciser x 3 min   -Performed wrist flexion/extension (2 positions), RD/UD, supination/pronation 2 x 15 reps each for wrist strengthening.   - APL/EPB stretch: 3 x 30"     supervised modalities after being cleared for contradictions:   -Patient received paraffin " bath to R hand(s) to increase blood flow, circulation, pain management and for tissue elasticity prior to manual techniques and therapeutic exercises.          Patient Education and Home Exercises     Education provided:   - Progress towards goals     Written Home Exercises Provided: Patient instructed to cont prior HEP.  Exercises were reviewed and Fausto was able to demonstrate them prior to the end of the session.  Fausto demonstrated fair  understanding of the home exercise program provided. See electronic medical record under Patient Instructions for exercises provided during therapy sessions.       Assessment     Pt tapped today in order to promote decreased overcompensation and tissue rest during therapeutic interventions.      Fausto is progressing well towards his goals and there are no updates to goals at this time. Pt prognosis is Good.     Patient will continue to benefit from skilled outpatient occupational therapy to address the deficits listed in the problem list on initial evaluation provide patient/family education and to maximize patient's level of independence in the home and community environment.     Patient's spiritual, cultural and educational needs considered and patient agreeable to plan of care and goals.    Anticipated barriers to occupational therapy: None    Goals:  Short Term Goals: (6 weeks):  1)  Patient to be IND with HEP and modalities for pain management  2)  Increase ROM 3-10 degrees to increase functional hand use for ADLs/work/leisure activities  3)   Decrease edema .1-.5mm to increase joint mobility /flexibility for functional hand use.   4)  Assess   and pinch and set goals accordingly      Long Term Goals :To be met by discharge (12 weeks)   1)  Increase  strength 2-8 lbs. For driving, lifting, carrying   2) Increase pinch strength 1-4 psi's for writing, typing, texting   3)   Increase ROM 11-20 degrees to increase functional hand use for ADLs/work/leisure  activities  4) Patient to score at __60___% OR MORE (FSM)  on FOTO to demonstrate improved perception of functional right  hand  Use.    Plan     Updates/Grading for next session: Cesar Cabezas OT   6/7/2024

## 2024-06-10 ENCOUNTER — PATIENT MESSAGE (OUTPATIENT)
Dept: INTERNAL MEDICINE | Facility: CLINIC | Age: 59
End: 2024-06-10
Payer: MEDICARE

## 2024-06-12 ENCOUNTER — CLINICAL SUPPORT (OUTPATIENT)
Dept: REHABILITATION | Facility: HOSPITAL | Age: 59
End: 2024-06-12
Payer: MEDICARE

## 2024-06-12 DIAGNOSIS — M65.4 DE QUERVAIN'S TENOSYNOVITIS, RIGHT: Primary | ICD-10-CM

## 2024-06-12 PROCEDURE — 97530 THERAPEUTIC ACTIVITIES: CPT

## 2024-06-12 PROCEDURE — 97140 MANUAL THERAPY 1/> REGIONS: CPT

## 2024-06-12 PROCEDURE — 97150 GROUP THERAPEUTIC PROCEDURES: CPT

## 2024-06-12 RX ORDER — DEXAMETHASONE SODIUM PHOSPHATE 4 MG/ML
4 INJECTION, SOLUTION INTRA-ARTICULAR; INTRALESIONAL; INTRAMUSCULAR; INTRAVENOUS; SOFT TISSUE
Status: DISCONTINUED | OUTPATIENT
Start: 2024-05-21 | End: 2024-06-12 | Stop reason: HOSPADM

## 2024-06-12 NOTE — PROGRESS NOTES
Right wrist pain  -     MRI Wrist Joint Without Contrast Right; Future     De Quervain's tenosynovitis, right        58 y.o. yo male with right de Quervain tenosynovitis possibly initiated from an IV placement back in February.  After long discussion we will order MRI to further evaluate right wrist.   He will follow-up  following his MRI to discuss further intervention.

## 2024-06-12 NOTE — PROGRESS NOTES
[facility]  Occupational Therapy Treatment Note     Date: 6/12/2024  Name: Fausto Pandey  Clinic Number: [id number]    Therapy Diagnosis:   Encounter Diagnosis   Name Primary?    De Quervain's tenosynovitis, right Yes     Physician: Faisal Brand MD    Medical Diagnosis: R dequervain's tenosynovitis   ICD-10: M65.4 (ICD-10-CM) - De Quervain's tenosynovitis, right      Physician: Faisal Brand MD; Dr. SUSAN Bucio   Physician Orders: Eval and Treat     Surgical Procedure and Date: n/a, n/a     Evaluation Date: 5/23/2024     Plan of Care Certification Period: 8/23/24     Date of Return to MD: 6/4/24     Visit # / Visits authorized: 2 / 12  Insurance Authorization Period Expiration: PENDING      FOTO #1: FSM__49___ / GOAL __65___  FOTO #2:   FOTO #3:      Precautions:  Standard     Time In:10:00 AM  Time Out: 10:45 AM  Total Appointment Time (timed & untimed codes): 45 minutes      Subjective     Patient reports: He has been performing his exercises and wearing his brace at night.   He was compliant with home exercise program given last session.   Response to previous treatment:rosette well  Functional change: not at this time. Good wearing of the oval 8 splint.     Pain: 5/10  Location: right hands      Objective       Hand ROM. Measured in degrees.    5/23/2024         Wrist ext/flex  45/70   RD/UD  8/30         Thumb: MP 0/50                IP 0/35       Rad ADD/ABD 20       Pal ADD/ABD 20          Opposition full   *Pain with RD     Sensation- intact      Special Tests:   +finkelsteing       Strength (Dyanmometer) and Pinch Strength (Pinch Gauge)  Measured in pounds and psi. Average of three trials.    5/23/2024 5/23/2024     Right  Left    Rung II       Sunshine Pinch 12 20   3pt Pinch 10 18   2pt Pinch 15 14         Manual Muscle Testing:           CMS Impairment/Limitation/Restriction for FOTO Survey     Therapist reviewed FOTO scores for Fausto Pandey on 5/23/2024.  "  FOTO documents entered into [employer] - see Media section.     Limitation Score: ___49___%      Goal: ___65___%  Category: Self Care        Treatment     Fausto received the treatments listed below:        manual therapy techniques: Myofacial release, Joint Mobilization and Soft tissue Mobilization were applied to the: right hand and wrist for 10 minutes, including:  - STM to the radial wrist for improved tissue elasticity and blood flow promotion.   - K tape for thumb positioning and MP joint support plus dequervain's taping to offload thumb tendons.   - Passive stretching for APL/EPB: 3 x 30"    therapeutic activities to improve functional performance for 25  minutes, including:  - APL/EPB stretch: 3 x 30"  - Joint blocking: 2 x 10  - Thumb active range of motion: circumduction, composite flexion,   - Wrist active range of motion: Rd/UD/WF/WE    Patient received paraffin bath to right hand(s) for 10 minutes to increase blood flow, circulation, pain management and for tissue elasticity prior to therex.      Patient Education and Home Exercises     Education provided:   - Anatomy of the thumb extensors  - causation of Dequervain's and tendons involved  - Progress towards goals     Written Home Exercises Provided: yes.  Exercises were reviewed and Fausto was able to demonstrate them prior to the end of the session.  Fausto demonstrated good  understanding of the home exercise program provided. See electronic medical record under Patient Instructions for exercises provided during therapy sessions.       Assessment      The patient presents this date with his oval 8 ring on his IP joint. Some pain during thumb extension but no increased pain during abduction this date, however, the patient remains limited in that range of motion. Improved thumb positioning following kinesio taping at end of session. Improved tightness in the radial wrist following manual therapy techniques and stretching. Therapy to continue to focus on " improved range of motion, thumb positioning and pain symptoms during functional activity.     Fausto is progressing well towards his goals and there are no updates to goals at this time. Pt prognosis is Good.     Patient will continue to benefit from skilled outpatient occupational therapy to address the deficits listed in the problem list on initial evaluation provide patient/family education and to maximize patient's level of independence in the home and community environment.     Patient's spiritual, cultural and educational needs considered and patient agreeable to plan of care and goals.    Anticipated barriers to occupational therapy: none    Goals:  Short Term Goals: (6 weeks):  1)  Patient to be IND with HEP and modalities for pain management  2)  Increase ROM 3-10 degrees to increase functional hand use for ADLs/work/leisure activities  3)   Decrease edema .1-.5mm to increase joint mobility /flexibility for functional hand use.   4)  Assess   and pinch and set goals accordingly      Long Term Goals :To be met by discharge (12 weeks)   1)  Increase  strength 2-8 lbs. For driving, lifting, carrying   2) Increase pinch strength 1-4 psi's for writing, typing, texting   3)   Increase ROM 11-20 degrees to increase functional hand use for ADLs/work/leisure activities  4) Patient to score at __60___% OR MORE (FSM)  on FOTO to demonstrate improved perception of functional right  hand  Use.    Plan     Updates/Grading for next session: Continue OT TRINA Lyles OT   6/12/2024

## 2024-06-18 ENCOUNTER — TELEPHONE (OUTPATIENT)
Dept: NEPHROLOGY | Facility: CLINIC | Age: 59
End: 2024-06-18
Payer: MEDICARE

## 2024-06-18 DIAGNOSIS — N18.32 STAGE 3B CHRONIC KIDNEY DISEASE: Primary | ICD-10-CM

## 2024-06-18 PROBLEM — M25.531 RIGHT WRIST PAIN: Status: ACTIVE | Noted: 2024-06-18

## 2024-06-21 ENCOUNTER — CLINICAL SUPPORT (OUTPATIENT)
Dept: REHABILITATION | Facility: HOSPITAL | Age: 59
End: 2024-06-21
Payer: MEDICARE

## 2024-06-21 DIAGNOSIS — M65.4 DE QUERVAIN'S TENOSYNOVITIS, RIGHT: Primary | ICD-10-CM

## 2024-06-21 DIAGNOSIS — M25.531 RIGHT WRIST PAIN: ICD-10-CM

## 2024-06-21 PROCEDURE — 97140 MANUAL THERAPY 1/> REGIONS: CPT

## 2024-06-21 PROCEDURE — 97530 THERAPEUTIC ACTIVITIES: CPT

## 2024-06-26 ENCOUNTER — PATIENT MESSAGE (OUTPATIENT)
Dept: NEPHROLOGY | Facility: CLINIC | Age: 59
End: 2024-06-26

## 2024-06-26 ENCOUNTER — OFFICE VISIT (OUTPATIENT)
Dept: NEPHROLOGY | Facility: CLINIC | Age: 59
End: 2024-06-26
Payer: MEDICARE

## 2024-06-26 ENCOUNTER — LAB VISIT (OUTPATIENT)
Dept: LAB | Facility: HOSPITAL | Age: 59
End: 2024-06-26
Payer: MEDICARE

## 2024-06-26 VITALS
OXYGEN SATURATION: 97 % | DIASTOLIC BLOOD PRESSURE: 85 MMHG | WEIGHT: 315 LBS | BODY MASS INDEX: 40.43 KG/M2 | HEART RATE: 87 BPM | SYSTOLIC BLOOD PRESSURE: 126 MMHG | HEIGHT: 74 IN

## 2024-06-26 DIAGNOSIS — E66.01 MORBID OBESITY WITH BMI OF 45.0-49.9, ADULT: ICD-10-CM

## 2024-06-26 DIAGNOSIS — I10 HYPERTENSION, UNSPECIFIED TYPE: Primary | ICD-10-CM

## 2024-06-26 DIAGNOSIS — N18.32 STAGE 3B CHRONIC KIDNEY DISEASE: ICD-10-CM

## 2024-06-26 DIAGNOSIS — E66.01 MORBID OBESITY: ICD-10-CM

## 2024-06-26 LAB
25(OH)D3+25(OH)D2 SERPL-MCNC: 66 NG/ML (ref 30–96)
ALBUMIN SERPL BCP-MCNC: 3.6 G/DL (ref 3.5–5.2)
ANION GAP SERPL CALC-SCNC: 9 MMOL/L (ref 8–16)
BASOPHILS # BLD AUTO: 0.04 K/UL (ref 0–0.2)
BASOPHILS NFR BLD: 0.7 % (ref 0–1.9)
BUN SERPL-MCNC: 27 MG/DL (ref 6–20)
CALCIUM SERPL-MCNC: 9.7 MG/DL (ref 8.7–10.5)
CHLORIDE SERPL-SCNC: 102 MMOL/L (ref 95–110)
CO2 SERPL-SCNC: 30 MMOL/L (ref 23–29)
CREAT SERPL-MCNC: 2.9 MG/DL (ref 0.5–1.4)
DIFFERENTIAL METHOD BLD: ABNORMAL
EOSINOPHIL # BLD AUTO: 0.6 K/UL (ref 0–0.5)
EOSINOPHIL NFR BLD: 10.5 % (ref 0–8)
ERYTHROCYTE [DISTWIDTH] IN BLOOD BY AUTOMATED COUNT: 12.8 % (ref 11.5–14.5)
EST. GFR  (NO RACE VARIABLE): 24.6 ML/MIN/1.73 M^2
GLUCOSE SERPL-MCNC: 105 MG/DL (ref 70–110)
HCT VFR BLD AUTO: 44.9 % (ref 40–54)
HGB BLD-MCNC: 14.6 G/DL (ref 14–18)
IMM GRANULOCYTES # BLD AUTO: 0.01 K/UL (ref 0–0.04)
IMM GRANULOCYTES NFR BLD AUTO: 0.2 % (ref 0–0.5)
LYMPHOCYTES # BLD AUTO: 2.7 K/UL (ref 1–4.8)
LYMPHOCYTES NFR BLD: 48.8 % (ref 18–48)
MCH RBC QN AUTO: 33.7 PG (ref 27–31)
MCHC RBC AUTO-ENTMCNC: 32.5 G/DL (ref 32–36)
MCV RBC AUTO: 104 FL (ref 82–98)
MONOCYTES # BLD AUTO: 0.5 K/UL (ref 0.3–1)
MONOCYTES NFR BLD: 9.1 % (ref 4–15)
NEUTROPHILS # BLD AUTO: 1.7 K/UL (ref 1.8–7.7)
NEUTROPHILS NFR BLD: 30.7 % (ref 38–73)
NRBC BLD-RTO: 0 /100 WBC
PHOSPHATE SERPL-MCNC: 3.2 MG/DL (ref 2.7–4.5)
PLATELET # BLD AUTO: 237 K/UL (ref 150–450)
PMV BLD AUTO: 10.6 FL (ref 9.2–12.9)
POTASSIUM SERPL-SCNC: 3.8 MMOL/L (ref 3.5–5.1)
PTH-INTACT SERPL-MCNC: 126 PG/ML (ref 9–77)
RBC # BLD AUTO: 4.33 M/UL (ref 4.6–6.2)
SODIUM SERPL-SCNC: 141 MMOL/L (ref 136–145)
WBC # BLD AUTO: 5.61 K/UL (ref 3.9–12.7)

## 2024-06-26 PROCEDURE — 99999 PR PBB SHADOW E&M-EST. PATIENT-LVL IV: CPT | Mod: PBBFAC,,, | Performed by: NURSE PRACTITIONER

## 2024-06-26 PROCEDURE — 99214 OFFICE O/P EST MOD 30 MIN: CPT | Mod: PBBFAC | Performed by: NURSE PRACTITIONER

## 2024-06-26 PROCEDURE — 80069 RENAL FUNCTION PANEL: CPT | Performed by: NURSE PRACTITIONER

## 2024-06-26 PROCEDURE — 36415 COLL VENOUS BLD VENIPUNCTURE: CPT | Performed by: NURSE PRACTITIONER

## 2024-06-26 PROCEDURE — 85025 COMPLETE CBC W/AUTO DIFF WBC: CPT | Performed by: NURSE PRACTITIONER

## 2024-06-26 PROCEDURE — 99214 OFFICE O/P EST MOD 30 MIN: CPT | Mod: S$PBB,,, | Performed by: NURSE PRACTITIONER

## 2024-06-26 PROCEDURE — 83970 ASSAY OF PARATHORMONE: CPT | Performed by: NURSE PRACTITIONER

## 2024-06-26 PROCEDURE — 82306 VITAMIN D 25 HYDROXY: CPT | Performed by: NURSE PRACTITIONER

## 2024-06-26 PROCEDURE — G2211 COMPLEX E/M VISIT ADD ON: HCPCS | Mod: S$PBB,,, | Performed by: NURSE PRACTITIONER

## 2024-06-26 NOTE — PROGRESS NOTES
Subjective:       Patient ID: Fausto Pandey is a 58 y.o. AA male who presents for follow-up evaluation of   CKD    HPI     Last seen in October 2020.     Patient presents for new evaluation of CKD.  Baseline creatinine difficult to determine due to infrequency of available labs; appears to be 2.5-2.7 mg/dL.      Recently with breast tenderness and was instructed to stop spironolactone.    Home BPs: 120s/80s    Significant hx includes HTN.    Social history: smokes ~4 cigarettes daily; no ETOH     The patient denies taking NSAIDs. Previously said he uses marijuana.     Significant family hx includes: HTN; no known renal disease     Last renal doppler US: November 2020, reviewed.    Update 12/10/21:  Last seen April 2021.  Baseline sCr: 2.5-2.7 mg/dL.  Home BPs: 140s/80s after  Denies NSAID use. Denies recent hospitalizations.  Still smoking cigarettes (no longer daily). Still smoking marijuana.  Plans for inguinal hernia surgery this month; has been having significant groin pain that impairs ability to exercise.    Update 4/8/22:  Returns for f/u of CKD.  Baseline sCr: 2.5-2.7 mg/dL, but recent levels have been in the 2.4 range.  Home BPs: 150/80s at home.  Went to ER with back pain. Says muscle relaxers and patch prescribed are not helping. Said tylenol helped in ER but not helping at home. Unsure if it is a different dose.  Has started walking on the treadmill again, trying to lose weight. Unable to right now due to back pain.    Update 7/25/22:  Returns for f/u of CKD.  Baseline sCr: 2.5-2.7 mg/dL.  Started chlorthalidone last visit.    Home BPs: 130s/80s   Still smoking, but has decreased.  Started exercising again recently.    Denies NSAIDs. Denies hospitalizations.    Update 10/28/22:  Returns for f/u of CKD.  Baseline sCr: 2.5-2.7 mg/dL.  Home BPs: has not been taking    Quit smoking cigarettes on 8/27.  Says he is exercising. Has gym membership.    Update 1/30/23:  Returns for f/u of CKD.  Baseline  "sCr: 2.5-2.7 mg/dL.  Home BPs: has not been taking  Walks for exercise. Has cut out fried food. Working on weight loss.    Update 4/5/23:  Returns for f/u of CKD.  Baseline sCr: 2.5-2.7 mg/dL.  Home BPs: 150s/90s (lots of stress recently); prior to stress was 130s/90s  Still walking for exercise. Working on diet.    Update 6/26/24:  Returns for f/u of CKD.  Baseline sCr: 2.5-2.7 mg/dL. No recent labs.  Takes tylenol for pain.    Review of Systems   Respiratory: Negative for shortness of breath.    Cardiovascular: Negative for leg swelling   Gastrointestinal: Negative for diarrhea, nausea and vomiting.   Genitourinary: Negative for difficulty urinating, dysuria, hematuria.         Objective:       Blood pressure 126/85, pulse 87, height 6' 2" (1.88 m), weight (!) 162.9 kg (359 lb 2.1 oz), SpO2 97%.  Physical Exam  Vitals reviewed.   Constitutional:       General: He is not in acute distress.     Appearance: Normal appearance. He is well-developed. He is obese. He is not ill-appearing or diaphoretic.   Cardiovascular:      Rate and Rhythm: Normal rate.   Pulmonary:      Effort: Pulmonary effort is normal. No respiratory distress.   Abdominal:      Tenderness: There is no right CVA tenderness or left CVA tenderness.   Musculoskeletal:      Cervical back: Neck supple.      Right lower leg: no edema.      Left lower leg: no edema.   Skin:     General: Skin is warm and dry.   Neurological:      Mental Status: He is alert and oriented to person, place, and time.   Psychiatric:         Mood and Affect: Mood normal.         Behavior: Behavior normal.         Thought Content: Thought content normal.         Judgment: Judgment normal.           Lab Results   Component Value Date    CREATININE 2.5 (H) 09/05/2023    CREATININE 2.5 (H) 09/05/2023    URICACID 6.3 09/05/2023     Prot/Creat Ratio, Urine   Date Value Ref Range Status   09/05/2023 0.11 0.00 - 0.20 Final   03/31/2023 0.14 0.00 - 0.20 Final   01/30/2023 0.12 0.00 - 0.20 " Final     Lab Results   Component Value Date     09/05/2023     09/05/2023    K 3.8 09/05/2023    K 3.8 09/05/2023    CO2 26 09/05/2023    CO2 26 09/05/2023     09/05/2023     09/05/2023     Lab Results   Component Value Date    .1 (H) 09/05/2023    CALCIUM 9.3 09/05/2023    CALCIUM 9.3 09/05/2023    PHOS 3.8 09/05/2023     Lab Results   Component Value Date    HGB 13.6 (L) 09/05/2023    WBC 6.14 09/05/2023    HCT 40.9 09/05/2023      Lab Results   Component Value Date    HGBA1C 4.9 02/15/2022     09/05/2023    BUN 26 (H) 09/05/2023    BUN 26 (H) 09/05/2023     Lab Results   Component Value Date    LDLCALC 124.2 03/31/2023         Assessment:       1. Hypertension, unspecified type    2. Morbid obesity with BMI of 45.0-49.9, adult    3. Stage 3b chronic kidney disease    4. Morbid obesity                Plan:   CKD stage 3B c eGFR 29-34 mL/min - clinically 2/2 longstanding poorly controlled HTN.  Need repeat labs to determine stability. Non-proteinuric.  Educated patient to control BP, BG, remain well-hydrated, and avoid NSAIDs to prevent progression of CKD.  Moderate risk of progression to ESRD in the next 5 years.    He has quit smoking cigarettes.    UPCR Previously proteinuric but it improved. On olmesartan   Acid-base WNL   Renal osteodystrophy Ca, phos okay. PTH elevated. On Vitamin D 2000 daily OTC.   Anemia WNL   DM N/a   Lipid Management On statin now. Recommend avoiding fenofibrate.   ESRD planning Defer      HTN -High today and has been high at home recently on amlodipine 10 mg, chlorthalidone 25 mg, hydralazine 100 mg TID, olmesartan 40 mg, metoprolol succinate 25 mg   - now off Hctz; says back pain has improved off it; also off spironolactone 2/2 breast tenderness  - Change amlodipine to nifedipine. Keep monitoring home BPs. Report if SBP is less than 110.    Morbid obesity - Encouraged weight loss and discussed benefits.    All questions patient had were answered.   Asked if further questions. None. F/u in clinic in 4 mos with labs and urine prior to next visit or sooner if needed.  ER for emergency concerns.     Summary of Plan:  Get labs  avoid NSAID/ bactrim/ IV contrast/ gadolinium/ aminoglycoside where possible  RTC in 4 mos      Visit today included increased complexity associated with managing the longitudinal care of the patient due to the serious and/or complex managed problem(s) CKD.

## 2024-06-27 ENCOUNTER — HOSPITAL ENCOUNTER (OUTPATIENT)
Dept: RADIOLOGY | Facility: HOSPITAL | Age: 59
Discharge: HOME | End: 2024-06-27
Payer: MEDICARE

## 2024-06-27 DIAGNOSIS — M25.531 RIGHT WRIST PAIN: ICD-10-CM

## 2024-06-27 PROCEDURE — 73221 MRI JOINT UPR EXTREM W/O DYE: CPT | Mod: TC,RT

## 2024-06-27 PROCEDURE — 73221 MRI JOINT UPR EXTREM W/O DYE: CPT | Mod: 26,RT,, | Performed by: RADIOLOGY

## 2024-06-28 ENCOUNTER — CLINICAL SUPPORT (OUTPATIENT)
Dept: REHABILITATION | Facility: HOSPITAL | Age: 59
End: 2024-06-28
Payer: MEDICARE

## 2024-06-28 DIAGNOSIS — M65.4 DE QUERVAIN'S TENOSYNOVITIS, RIGHT: Primary | ICD-10-CM

## 2024-06-28 DIAGNOSIS — M25.531 RIGHT WRIST PAIN: ICD-10-CM

## 2024-06-28 PROCEDURE — 97530 THERAPEUTIC ACTIVITIES: CPT

## 2024-06-28 PROCEDURE — 97140 MANUAL THERAPY 1/> REGIONS: CPT

## 2024-06-28 PROCEDURE — 97018 PARAFFIN BATH THERAPY: CPT

## 2024-06-28 NOTE — PROGRESS NOTES
[facility] OUTPATIENT THERAPY AND WELLNESS  Occupational Therapy Treatment Note     Date: 6/28/2024  Name: aFusto Pandey  Clinic Number: [id number]    Therapy Diagnosis:   Encounter Diagnoses   Name Primary?    De Quervain's tenosynovitis, right Yes    Right wrist pain      Physician: Faisal Brand MD    Medical Diagnosis: R dequervain's tenosynovitis   ICD-10: M65.4 (ICD-10-CM) - De Quervain's tenosynovitis, right      Physician: Faisal Brand MD; Dr. SUSAN Bucio   Physician Orders: Eval and Treat     Surgical Procedure and Date: n/a, n/a     Evaluation Date: 5/23/2024     Plan of Care Certification Period: 8/23/24     Date of Return to MD: 6/4/24     Visit # / Visits authorized: 4 / 12  Insurance Authorization Period Expiration: PENDING      FOTO #1: FSM__49___ / GOAL __65___  FOTO #2: 45  FOTO #3:      Precautions:  Standard     Time In:10:00 AM  Time Out: 10:45 AM  Total Appointment Time (timed & untimed codes): 45 minutes      Subjective     Patient reports: He has been performing his exercises and wearing his brace at night. He follows up [facility] MD next week  He was compliant with home exercise program given last session.   Response to previous treatment:rosette well  Functional change: not at this time. Good wearing of the oval 8 splint.     Pain: 5/10  Location: right hands      Objective       Hand ROM. Measured in degrees.    5/23/2024 6/28/2024          Wrist ext/flex  45/70 55/75   RD/UD  8/30 15/30          Thumb: MP 0/50 0/45                IP 0/35 0/60       Rad ADD/ABD 20 35       Pal ADD/ABD 20 45          Opposition full full        Sensation- intact      Special Tests:   +finkelsteing       Strength (Dyanmometer) and Pinch Strength (Pinch Gauge)  Measured in pounds and psi. Average of three trials.    5/23/2024 6/28/24 5/23/2024     Right  Right Left    Rung II   98     Sunshine Pinch 12 23 20   3pt Pinch 10 14 18   2pt Pinch 15 10 14                 CMS Impairment/Limitation/Restriction for  "FOTO Survey     Therapist reviewed FOTO scores for Fausto Pandey on 5/23/2024.   FOTO documents entered into [employer] - see Media section.     Limitation Score: ___49___%      Goal: ___65___%  Category: Self Care        Treatment     Fausto received the treatments listed below:        manual therapy techniques: Myofacial release, Joint Mobilization and Soft tissue Mobilization were applied to the: right hand and wrist for 10 minutes, including:  - STM to the radial wrist for improved tissue elasticity and blood flow promotion.   - K tape for thumb positioning and MP joint support plus dequervain's taping to offload thumb tendons.   - Passive stretching for APL/EPB: 3 x 30"    therapeutic activities to improve functional performance for 25  minutes, including:  - APL/EPB stretch: 3 x 30"  - Joint blocking: 2 x 10  - Thumb active range of motion: circumduction, composite flexion,   - Wrist active range of motion: Rd/UD/WF/WE    Patient received paraffin bath to right hand(s) for 10 minutes to increase blood flow, circulation, pain management and for tissue elasticity prior to therex.      Patient Education and Home Exercises     Education provided:   - Anatomy of the thumb extensors  - causation of Dequervain's and tendons involved  - Progress towards goals     Written Home Exercises Provided: yes.  Exercises were reviewed and Fausto was able to demonstrate them prior to the end of the session.  Fausto demonstrated good  understanding of the home exercise program provided. See electronic medical record under Patient Instructions for exercises provided during therapy sessions.       Assessment      The patient presents with continued pain but overall improved ROM and some strength. He will follow up with MD in ordure to address continued concerns.     Fausto is progressing well towards his goals and there are no updates to goals at this time. Pt prognosis is Good.     Patient will continue to benefit from skilled " outpatient occupational therapy to address the deficits listed in the problem list on initial evaluation provide patient/family education and to maximize patient's level of independence in the home and community environment.     Patient's spiritual, cultural and educational needs considered and patient agreeable to plan of care and goals.    Anticipated barriers to occupational therapy: none    Goals:  Short Term Goals: (6 weeks):  1)  Patient to be IND with HEP and modalities for pain management  2)  Increase ROM 3-10 degrees to increase functional hand use for ADLs/work/leisure activities  3)   Decrease edema .1-.5mm to increase joint mobility /flexibility for functional hand use.   4)  Assess   and pinch and set goals accordingly      Long Term Goals :To be met by discharge (12 weeks)   1)  Increase  strength 2-8 lbs. For driving, lifting, carrying   2) Increase pinch strength 1-4 psi's for writing, typing, texting   3)   Increase ROM 11-20 degrees to increase functional hand use for ADLs/work/leisure activities  4) Patient to score at __60___% OR MORE (FSM)  on FOTO to demonstrate improved perception of functional right  hand  Use.    Plan     Updates/Grading for next session: Continue OT TRINA Cabezas OT   6/28/2024

## 2024-07-02 ENCOUNTER — OFFICE VISIT (OUTPATIENT)
Dept: ORTHOPEDICS | Facility: CLINIC | Age: 59
End: 2024-07-02
Payer: MEDICARE

## 2024-07-02 VITALS — HEIGHT: 74 IN | BODY MASS INDEX: 40.43 KG/M2 | WEIGHT: 315 LBS

## 2024-07-02 DIAGNOSIS — M65.4 DE QUERVAIN'S TENOSYNOVITIS, RIGHT: Primary | ICD-10-CM

## 2024-07-02 PROCEDURE — 99999 PR PBB SHADOW E&M-EST. PATIENT-LVL III: CPT | Mod: PBBFAC,,, | Performed by: ORTHOPAEDIC SURGERY

## 2024-07-02 PROCEDURE — 99214 OFFICE O/P EST MOD 30 MIN: CPT | Mod: S$PBB,,, | Performed by: ORTHOPAEDIC SURGERY

## 2024-07-02 PROCEDURE — 99213 OFFICE O/P EST LOW 20 MIN: CPT | Mod: PBBFAC | Performed by: ORTHOPAEDIC SURGERY

## 2024-07-02 NOTE — PROGRESS NOTES
[facility]  History & Physical  Orthopedics    SUBJECTIVE:      Chief Complaint: right wrist pain   HPI interval 06/04/2024   Patient is here for follow-up right wrist pain/ right wrist de Quervain tenosynovitis following IV placement February 23rd.  Had a CSI on 5/2024 and reports mild improvement . Continues to have pain on movement with flexion and extension of wrist.  Denies any numbness or tingling.  Reports he has been using his thumb spica every night and also going to therapy.       History of Present Illness:  Patient is a 58 y.o. right hand dominant male who presents today with complaints of right wrist pain.  The patient had a left long finger trigger finger release on February 23rd of this year.  He states he had an IV placed into his right wrist for that procedure.  Since that time, he has complained of right wrist pain located over the radial aspect of the wrist.  He states he has pain with any movement of the thumb or wrist and is able to complete his ADLs due to the pain.  He has a thumb spica brace and has mild-to-moderate relief in the pain with wearing the brace.  He denies numbness and tingling in the right upper extremity.  Regarding his trigger finger, he states his symptoms have resolved due to the surgery.    HPI  7/2/24 Patient is here today to review his MRI, 6/27. He had a trigger finger release 2/23/24 and an IV was placed into his wrist for the procedure. He continues to wear the brace which he states helps. Patient states his pain today is 5/10.     Onset of symptoms/DOI was February 23, 2024.    Symptoms are aggravated by movement.    Symptoms are alleviated by immobilization.    Symptoms consist of pain and decreased ROM.    The patient rates their pain as a 7/10.    Attempted treatment(s) and/or interventions include activity modifications, rest, immobilization.     The patient denies any fevers, chills, N/V, D/C and presents for evaluation.       Past Medical  History:   Diagnosis Date    Benign essential HTN 8/22/2017    Chronic kidney disease, stage III (moderate) 8/22/2017    Internal derangement of left knee 10/9/2017     Past Surgical History:   Procedure Laterality Date    inguinal hernia surgery      TRIGGER FINGER RELEASE Left 2/23/2024    Procedure: RELEASE, TRIGGER ,LEFT MIDDLE FINGER;  Surgeon: Goldie Garcia MD;  Location: [facility];  Service: Orthopedics;  Laterality: Left;     Review of patient's allergies indicates:   Allergen Reactions    Spironolactone Other (See Comments)     Breast tenderness    Ibuprofen Other (See Comments)     Unable to take Ibuprofen due to decreased kidney function     Social History     Social History Narrative    Not on file     Family History   Problem Relation Name Age of Onset    Hypertension Mother      Hypertension Father      Hypertension Brother           Current Outpatient Medications:     acetaminophen (TYLENOL) 500 MG tablet, Take 1 tablet (500 mg total) by mouth every 6 (six) hours as needed for Pain., Disp: 30 tablet, Rfl: 0    allopurinoL (ZYLOPRIM) 100 MG tablet, Take 1.5 tablets (150 mg total) by mouth once daily., Disp: 135 tablet, Rfl: 3    amLODIPine (NORVASC) 10 MG tablet, Take 1 tablet (10 mg total) by mouth once daily., Disp: 90 tablet, Rfl: 3    atorvastatin (LIPITOR) 80 MG tablet, Take 1 tablet (80 mg total) by mouth once daily. (Patient not taking: Reported on 6/26/2024), Disp: 90 tablet, Rfl: 3    cetirizine (ZYRTEC) 10 MG tablet, Take 1 tablet (10 mg total) by mouth 2 (two) times daily., Disp: 10 tablet, Rfl: 0    chlorthalidone (HYGROTEN) 25 MG Tab, Take 1 tablet (25 mg total) by mouth once daily., Disp: 90 tablet, Rfl: 3    cholecalciferol, vitamin D3, (VITAMIN D3) 50 mcg (2,000 unit) Cap capsule, Take 1 capsule by mouth once daily., Disp: , Rfl:     colchicine, gout, (COLCRYS) 0.6 mg tablet, Take 1 tablet (0.6 mg total) by mouth every other day., Disp: 15 tablet, Rfl: 2    hydrALAZINE  "(APRESOLINE) 100 MG tablet, Take 1 tablet (100 mg total) by mouth every 8 (eight) hours., Disp: 270 tablet, Rfl: 3    methocarbamoL (ROBAXIN) 750 MG Tab, Take 1 tablet (750 mg total) by mouth 3 (three) times daily as needed (pain)., Disp: 21 tablet, Rfl: 0    metoprolol succinate (TOPROL-XL) 25 MG 24 hr tablet, Take 1 tablet (25 mg total) by mouth once daily., Disp: 90 tablet, Rfl: 3    olmesartan (BENICAR) 40 MG tablet, TAKE 1 TABLET(40 MG) BY MOUTH EVERY DAY, Disp: 90 tablet, Rfl: 3      Review of Systems:  As per HPI otherwise noncontributory    OBJECTIVE:      Vital Signs (Most Recent):  Vitals:    07/02/24 0952   Weight: (!) 162.9 kg (359 lb 2.1 oz)   Height: 6' 2" (1.88 m)     Body mass index is 46.11 kg/m².      Physical Exam:  Constitutional: The patient appears well-developed and well-nourished. No distress.   Skin: No lesions appreciated  Head: Normocephalic and atraumatic.   Nose: Nose normal.   Ears: No deformities seen  Eyes: Conjunctivae and EOM are normal.   Neck: No tracheal deviation present.   Cardiovascular: Normal rate and intact distal pulses.    Pulmonary/Chest: Effort normal. No respiratory distress.   Abdominal: There is no guarding.   Neurological: The patient is alert.   Psychiatric: The patient has a normal mood and affect.     Right Hand/Wrist Examination:    Observation/Inspection:  Swelling  none    Deformity  none  Discoloration  none     Scars   none    Atrophy  none    HAND/WRIST EXAMINATION:  Finkelstein's Test   Positive  WHAT Test    Positive  Snuff box tenderness   Positive  Vicente's Test    Neg  Hook of Hamate Tenderness  Neg  CMC grind    Neg  Circumduction test   Neg    Neurovascular Exam:  Digits WWP, brisk CR < 3s throughout  NVI motor/LTS to M/R/U nerves, radial pulse 2+  Tinel's Test - Carpal Tunnel  Neg  Tinel's Test - Cubital Tunnel  Neg  Phalen's Test    Neg  Median Nerve Compression Test Neg    Pain with Thumb extension and abduction    ROM hand full, painless    ROM " wrist full, painless except for the above test.  Moderate tenderness to palpation along the 1st dorsal compartment.    ROM elbow full, painless    Abdomen not guarded  Respirations nonlabored  Perfusion intact    Diagnostic Results:   XRAY right 04/03/2024  FINDINGS:  No evidence of acute displaced fracture, dislocation, or osseous destructive process.  No radiopaque retained foreign body seen.     Impression:     No acute osseous abnormality identified.     1 . Severe tendinosis and tenosynovitis of the 1st extensor compartment, possibly with interstitial tear of abductor pollicis longus tendon.  2. Irregular narrowing of the lunotriquetral cartilage space with cystic changes, possibly degenerative or sequela of non osseous coa        Imaging - I independently viewed the patient's imaging as well as the radiology report.  Xrays of the patient's right wrist demonstrate no evidence of any acute fractures or dislocations or significant degenerative changes.    ASSESSMENT/PLAN:      There are no diagnoses linked to this encounter.   Reviewed MRI with the patient and dicussed treatment options.  Injection was offered today as well as surgery options to release the tendon  He is going to discuss with his wife and reach out to us regarding his treatment

## 2024-07-02 NOTE — PROGRESS NOTES
[facility]  Occupational Therapy Treatment Note     Date: 6/21/2024  Name: Fausto Pandey  Clinic Number: [id number]    Therapy Diagnosis:   Encounter Diagnoses   Name Primary?    De Quervain's tenosynovitis, right Yes    Right wrist pain      Physician: Faisal Brand MD    Medical Diagnosis: R dequervain's tenosynovitis   ICD-10: M65.4 (ICD-10-CM) - De Quervain's tenosynovitis, right      Physician: Faisal Brand MD; Dr. SUSAN Bucio   Physician Orders: Eval and Treat     Surgical Procedure and Date: n/a, n/a     Evaluation Date: 5/23/2024     Plan of Care Certification Period: 8/23/24     Date of Return to MD: 6/4/24     Visit # / Visits authorized: 2 / 12  Insurance Authorization Period Expiration: PENDING      FOTO #1: FSM__49___ / GOAL __65___  FOTO #2:   FOTO #3:      Precautions:  Standard     Time In:10:00 AM  Time Out: 10:45 AM  Total Appointment Time (timed & untimed codes): 45 minutes      Subjective     Patient reports: It still hurts, IIm having the MRI  He was compliant with home exercise program given last session.   Response to previous treatment:rosette well  Functional change: not at this time. Good wearing of the oval 8 splint.     Pain: 5/10  Location: right hands      Objective       Hand ROM. Measured in degrees.    5/23/2024         Wrist ext/flex  45/70   RD/UD  8/30         Thumb: MP 0/50                IP 0/35       Rad ADD/ABD 20       Pal ADD/ABD 20          Opposition full   *Pain with RD     Sensation- intact      Special Tests:   +finkelsteing       Strength (Dyanmometer) and Pinch Strength (Pinch Gauge)  Measured in pounds and psi. Average of three trials.    5/23/2024 5/23/2024     Right  Left    Rung II       Sunshine Pinch 12 20   3pt Pinch 10 18   2pt Pinch 15 14         Manual Muscle Testing:           CMS Impairment/Limitation/Restriction for FOTO Survey     Therapist reviewed FOTO scores for Fausto Pandey on 5/23/2024.   FOTO  "documents entered into EPIC - see Media section.     Limitation Score: ___49___%      Goal: ___65___%  Category: Self Care        Treatment     Fausto received the treatments listed below:        manual therapy techniques: Myofacial release, Joint Mobilization and Soft tissue Mobilization were applied to the: right hand and wrist for 10 minutes, including:  - STM to the radial wrist for improved tissue elasticity and blood flow promotion.   - K tape for thumb positioning and MP joint support plus dequervain's taping to offload thumb tendons.   - Passive stretching for APL/EPB: 3 x 30"    therapeutic activities to improve functional performance for 25  minutes, including:  - APL/EPB stretch: 3 x 30"  - Joint blocking: 2 x 10  - Thumb active range of motion: circumduction, composite flexion,   - Wrist active range of motion: Rd/UD/WF/WE    Patient received paraffin bath to right hand(s) for 10 minutes to increase blood flow, circulation, pain management and for tissue elasticity prior to therex.      Patient Education and Home Exercises     Education provided:   - Anatomy of the thumb extensors  - causation of Dequervain's and tendons involved  - Progress towards goals     Written Home Exercises Provided: yes.  Exercises were reviewed and Fausto was able to demonstrate them prior to the end of the session.  Fausto demonstrated good  understanding of the home exercise program provided. See electronic medical record under Patient Instructions for exercises provided during therapy sessions.       Assessment      The patient presents this date with his oval 8 ring on his IP joint. Some pain during thumb extension but no increased pain during abduction this date, however, the patient remains limited in that range of motion. IHe reports continued pain and will undergo MRI in a week or so.     Fausto is progressing well towards his goals and there are no updates to goals at this time. Pt prognosis is Good.     Patient will " continue to benefit from skilled outpatient occupational therapy to address the deficits listed in the problem list on initial evaluation provide patient/family education and to maximize patient's level of independence in the home and community environment.     Patient's spiritual, cultural and educational needs considered and patient agreeable to plan of care and goals.    Anticipated barriers to occupational therapy: none    Goals:  Short Term Goals: (6 weeks):  1)  Patient to be IND with HEP and modalities for pain management  2)  Increase ROM 3-10 degrees to increase functional hand use for ADLs/work/leisure activities  3)   Decrease edema .1-.5mm to increase joint mobility /flexibility for functional hand use.   4)  Assess   and pinch and set goals accordingly      Long Term Goals :To be met by discharge (12 weeks)   1)  Increase  strength 2-8 lbs. For driving, lifting, carrying   2) Increase pinch strength 1-4 psi's for writing, typing, texting   3)   Increase ROM 11-20 degrees to increase functional hand use for ADLs/work/leisure activities  4) Patient to score at __60___% OR MORE (FSM)  on FOTO to demonstrate improved perception of functional right  hand  Use.    Plan     Updates/Grading for next session: Continue OT TRINA Cabezas OT   6/21/2024

## 2024-07-03 ENCOUNTER — PATIENT MESSAGE (OUTPATIENT)
Dept: ORTHOPEDICS | Facility: CLINIC | Age: 59
End: 2024-07-03
Payer: MEDICARE

## 2024-07-03 ENCOUNTER — TELEPHONE (OUTPATIENT)
Dept: NEPHROLOGY | Facility: CLINIC | Age: 59
End: 2024-07-03
Payer: MEDICARE

## 2024-07-03 ENCOUNTER — PATIENT MESSAGE (OUTPATIENT)
Dept: NEPHROLOGY | Facility: CLINIC | Age: 59
End: 2024-07-03
Payer: MEDICARE

## 2024-07-10 ENCOUNTER — OFFICE VISIT (OUTPATIENT)
Dept: ORTHOPEDICS | Facility: CLINIC | Age: 59
End: 2024-07-10
Payer: MEDICARE

## 2024-07-10 VITALS — HEIGHT: 74 IN | WEIGHT: 315 LBS | BODY MASS INDEX: 40.43 KG/M2

## 2024-07-10 DIAGNOSIS — M65.4 DE QUERVAIN'S TENOSYNOVITIS, RIGHT: Primary | ICD-10-CM

## 2024-07-10 PROCEDURE — 99213 OFFICE O/P EST LOW 20 MIN: CPT | Mod: PBBFAC | Performed by: SPECIALIST/TECHNOLOGIST

## 2024-07-10 PROCEDURE — 99214 OFFICE O/P EST MOD 30 MIN: CPT | Mod: S$PBB,,, | Performed by: SPECIALIST/TECHNOLOGIST

## 2024-07-10 PROCEDURE — 99999 PR PBB SHADOW E&M-EST. PATIENT-LVL III: CPT | Mod: PBBFAC,,, | Performed by: SPECIALIST/TECHNOLOGIST

## 2024-07-10 NOTE — PROGRESS NOTES
[facility]  History & Physical  Orthopedics    SUBJECTIVE:      Chief Complaint: right wrist pain    Interval HPI  7/10/24  Patient reports today to discuss surgical intervention of his right de Quervain.  He states that he is having continued pain with motion.  He had a steroid injection back in May with minimal improvement.  He is failed conservative treatment.    Interval HPI    7/2/24   Patient is here today to review his MRI, 6/27. He had a trigger finger release 2/23/24 and an IV was placed into his wrist for the procedure. He continues to wear the brace which he states helps. Patient states his pain today is 5/10.     HPI interval   06/04/2024   Patient is here for follow-up right wrist pain/ right wrist de Quervain tenosynovitis following IV placement February 23rd.  Had a CSI on 5/2024 and reports mild improvement . Continues to have pain on movement with flexion and extension of wrist.  Denies any numbness or tingling.  Reports he has been using his thumb spica every night and also going to therapy.     History of Present Illness:  Patient is a 58 y.o. right hand dominant male who presents today with complaints of right wrist pain.  The patient had a left long finger trigger finger release on February 23rd of this year.  He states he had an IV placed into his right wrist for that procedure.  Since that time, he has complained of right wrist pain located over the radial aspect of the wrist.  He states he has pain with any movement of the thumb or wrist and is able to complete his ADLs due to the pain.  He has a thumb spica brace and has mild-to-moderate relief in the pain with wearing the brace.  He denies numbness and tingling in the right upper extremity.  Regarding his trigger finger, he states his symptoms have resolved due to the surgery.    Onset of symptoms/DOI was February 23, 2024.    Symptoms are aggravated by movement.    Symptoms are alleviated by immobilization.    Symptoms  consist of pain and decreased ROM.    The patient rates their pain as a 7/10.    Attempted treatment(s) and/or interventions include activity modifications, rest, immobilization.     The patient denies any fevers, chills, N/V, D/C and presents for evaluation.       Past Medical History:   Diagnosis Date    Benign essential HTN 8/22/2017    Chronic kidney disease, stage III (moderate) 8/22/2017    Internal derangement of left knee 10/9/2017     Past Surgical History:   Procedure Laterality Date    inguinal hernia surgery      TRIGGER FINGER RELEASE Left 2/23/2024    Procedure: RELEASE, TRIGGER ,LEFT MIDDLE FINGER;  Surgeon: Goldie Garcia MD;  Location: [facility];  Service: Orthopedics;  Laterality: Left;     Review of patient's allergies indicates:   Allergen Reactions    Spironolactone Other (See Comments)     Breast tenderness    Ibuprofen Other (See Comments)     Unable to take Ibuprofen due to decreased kidney function     Social History     Social History Narrative    Not on file     Family History   Problem Relation Name Age of Onset    Hypertension Mother      Hypertension Father      Hypertension Brother           Current Outpatient Medications:     acetaminophen (TYLENOL) 500 MG tablet, Take 1 tablet (500 mg total) by mouth every 6 (six) hours as needed for Pain., Disp: 30 tablet, Rfl: 0    allopurinoL (ZYLOPRIM) 100 MG tablet, Take 1.5 tablets (150 mg total) by mouth once daily., Disp: 135 tablet, Rfl: 3    amLODIPine (NORVASC) 10 MG tablet, Take 1 tablet (10 mg total) by mouth once daily., Disp: 90 tablet, Rfl: 3    atorvastatin (LIPITOR) 80 MG tablet, Take 1 tablet (80 mg total) by mouth once daily. (Patient not taking: Reported on 6/26/2024), Disp: 90 tablet, Rfl: 3    cetirizine (ZYRTEC) 10 MG tablet, Take 1 tablet (10 mg total) by mouth 2 (two) times daily., Disp: 10 tablet, Rfl: 0    chlorthalidone (HYGROTEN) 25 MG Tab, Take 1 tablet (25 mg total) by mouth once daily., Disp: 90 tablet, Rfl: 3    " cholecalciferol, vitamin D3, (VITAMIN D3) 50 mcg (2,000 unit) Cap capsule, Take 1 capsule by mouth once daily., Disp: , Rfl:     colchicine, gout, (COLCRYS) 0.6 mg tablet, Take 1 tablet (0.6 mg total) by mouth every other day., Disp: 15 tablet, Rfl: 2    hydrALAZINE (APRESOLINE) 100 MG tablet, Take 1 tablet (100 mg total) by mouth every 8 (eight) hours., Disp: 270 tablet, Rfl: 3    methocarbamoL (ROBAXIN) 750 MG Tab, Take 1 tablet (750 mg total) by mouth 3 (three) times daily as needed (pain)., Disp: 21 tablet, Rfl: 0    metoprolol succinate (TOPROL-XL) 25 MG 24 hr tablet, Take 1 tablet (25 mg total) by mouth once daily., Disp: 90 tablet, Rfl: 3    olmesartan (BENICAR) 40 MG tablet, TAKE 1 TABLET(40 MG) BY MOUTH EVERY DAY, Disp: 90 tablet, Rfl: 3      Review of Systems:  As per HPI otherwise noncontributory    OBJECTIVE:      Vital Signs (Most Recent):  Vitals:    07/10/24 1428   Weight: (!) 162.9 kg (359 lb 2.1 oz)   Height: 6' 2" (1.88 m)     Body mass index is 46.11 kg/m².      Physical Exam:  Constitutional: The patient appears well-developed and well-nourished. No distress.   Skin: No lesions appreciated  Head: Normocephalic and atraumatic.   Nose: Nose normal.   Ears: No deformities seen  Eyes: Conjunctivae and EOM are normal.   Neck: No tracheal deviation present.   Cardiovascular: Normal rate and intact distal pulses.    Pulmonary/Chest: Effort normal. No respiratory distress.   Abdominal: There is no guarding.   Neurological: The patient is alert.   Psychiatric: The patient has a normal mood and affect.     Right Hand/Wrist Examination:    Observation/Inspection:  Swelling  none    Deformity  none  Discoloration  none     Scars   none    Atrophy  none    HAND/WRIST EXAMINATION:  Finkelstein's Test   Positive  WHAT Test    Positive  Snuff box tenderness   Neg  Vicente's Test    Neg  Hook of Hamate Tenderness  Neg  CMC grind    Neg  Circumduction test   Neg    Neurovascular Exam:  Digits WWP, brisk CR < 3s " throughout  NVI motor/LTS to M/R/U nerves, radial pulse 2+  Tinel's Test - Carpal Tunnel  Neg  Tinel's Test - Cubital Tunnel  Neg  Phalen's Test    Neg  Median Nerve Compression Test Neg    Pain with Thumb extension and abduction    ROM hand full, painless    ROM wrist full, painless except for the above test.  Moderate tenderness to palpation along the 1st dorsal compartment.    ROM elbow full, painless    Abdomen not guarded  Respirations nonlabored  Perfusion intact    Diagnostic Results:   XRAY right 04/03/2024  FINDINGS:  No evidence of acute displaced fracture, dislocation, or osseous destructive process.  No radiopaque retained foreign body seen.     Impression:     No acute osseous abnormality identified.     1 . Severe tendinosis and tenosynovitis of the 1st extensor compartment, possibly with interstitial tear of abductor pollicis longus tendon.  2. Irregular narrowing of the lunotriquetral cartilage space with cystic changes, possibly degenerative or sequela of non osseous coa        Imaging - I independently viewed the patient's imaging as well as the radiology report.  Xrays of the patient's right wrist demonstrate no evidence of any acute fractures or dislocations or significant degenerative changes.    ASSESSMENT/PLAN:      Fausto was seen today for follow-up.    Diagnoses and all orders for this visit:    De Quervain's tenosynovitis, right        Educated patient on de Quervain syndrome.  Patient would like to proceed with surgical intervention. We have discussed risks of hand surgery which include but are not limited to  blood clots in the legs that can travel to the lungs (pulmonary embolism). Pulmonary embolism can cause shortness of breath, chest pain, and even shock. Other risks include urinary tract infection, nausea and vomiting (usually related to pain medication), chronic pain, nerve damage, blood vessel injury, and infection of the hand which can require re-operation. Furthermore, the risks  of anesthesia include potential heart, lung, kidney, and liver damage.  He understands and is ready for the procedure.  Consents obtained in clinic.  Patient we will follow up 2 weeks postoperatively.  Patient was educated no lifting pushing or pulling for 2 weeks postoperatively.

## 2024-07-18 DIAGNOSIS — M25.531 RIGHT WRIST PAIN: Primary | ICD-10-CM

## 2024-07-18 DIAGNOSIS — M65.4 DE QUERVAIN'S TENOSYNOVITIS, RIGHT: ICD-10-CM

## 2024-07-19 ENCOUNTER — PATIENT MESSAGE (OUTPATIENT)
Dept: PREADMISSION TESTING | Facility: OTHER | Age: 59
End: 2024-07-19
Payer: MEDICARE

## 2024-07-22 RX ORDER — PREGABALIN 75 MG/1
75 CAPSULE ORAL ONCE
OUTPATIENT
Start: 2024-07-22 | End: 2024-07-22

## 2024-07-22 RX ORDER — LIDOCAINE HYDROCHLORIDE 10 MG/ML
0.5 INJECTION, SOLUTION EPIDURAL; INFILTRATION; INTRACAUDAL; PERINEURAL ONCE
OUTPATIENT
Start: 2024-07-22 | End: 2024-07-22

## 2024-07-22 RX ORDER — ACETAMINOPHEN 500 MG
1000 TABLET ORAL
OUTPATIENT
Start: 2024-07-22 | End: 2024-07-22

## 2024-07-22 RX ORDER — SODIUM CHLORIDE, SODIUM LACTATE, POTASSIUM CHLORIDE, CALCIUM CHLORIDE 600; 310; 30; 20 MG/100ML; MG/100ML; MG/100ML; MG/100ML
INJECTION, SOLUTION INTRAVENOUS CONTINUOUS
OUTPATIENT
Start: 2024-07-22

## 2024-07-23 ENCOUNTER — HOSPITAL ENCOUNTER (OUTPATIENT)
Dept: PREADMISSION TESTING | Facility: OTHER | Age: 59
Discharge: HOME | End: 2024-07-23
Attending: ORTHOPAEDIC SURGERY
Payer: MEDICARE

## 2024-07-23 VITALS
WEIGHT: 315 LBS | SYSTOLIC BLOOD PRESSURE: 141 MMHG | DIASTOLIC BLOOD PRESSURE: 75 MMHG | RESPIRATION RATE: 19 BRPM | HEART RATE: 75 BPM | BODY MASS INDEX: 40.43 KG/M2 | TEMPERATURE: 98 F | OXYGEN SATURATION: 95 % | HEIGHT: 74 IN

## 2024-07-23 DIAGNOSIS — Z01.818 PREOP TESTING: Primary | ICD-10-CM

## 2024-07-23 LAB
ANION GAP SERPL CALC-SCNC: 10 MMOL/L (ref 8–16)
BUN SERPL-MCNC: 31 MG/DL (ref 6–20)
CALCIUM SERPL-MCNC: 9.6 MG/DL (ref 8.7–10.5)
CHLORIDE SERPL-SCNC: 104 MMOL/L (ref 95–110)
CO2 SERPL-SCNC: 27 MMOL/L (ref 23–29)
CREAT SERPL-MCNC: 2.9 MG/DL (ref 0.5–1.4)
EST. GFR  (NO RACE VARIABLE): 25 ML/MIN/1.73 M^2
GLUCOSE SERPL-MCNC: 121 MG/DL (ref 70–110)
POTASSIUM SERPL-SCNC: 3.8 MMOL/L (ref 3.5–5.1)
SODIUM SERPL-SCNC: 141 MMOL/L (ref 136–145)

## 2024-07-23 PROCEDURE — 80048 BASIC METABOLIC PNL TOTAL CA: CPT | Performed by: ANESTHESIOLOGY

## 2024-07-23 RX ORDER — HYDROCODONE BITARTRATE AND ACETAMINOPHEN 5; 325 MG/1; MG/1
1 TABLET ORAL EVERY 6 HOURS PRN
COMMUNITY

## 2024-07-23 NOTE — DISCHARGE INSTRUCTIONS
Information to Prepare you for your Surgery    PRE-ADMIT TESTING -  [phone]    [address]  [facility]          Your surgery has been scheduled at [facility]. We are pleased to have the opportunity to serve you. For Further Information please call [phone].    On the day of surgery please report to the Information Desk on the 1st floor.    CONTACT YOUR PHYSICIAN'S OFFICE THE DAY PRIOR TO YOUR SURGERY TO OBTAIN YOUR ARRIVAL TIME.     The evening before surgery do not eat anything after 9 p.m. ( this includes hard candy, chewing gum and mints).  You may only have GATORADE, POWERADE AND WATER  from 9 p.m. until you leave your home.   DO NOT DRINK ANY LIQUIDS ON THE WAY TO THE HOSPITAL.      Why does your anesthesiologist allow you to drink Gatorade/Powerade before surgery?  Gatorade/Powerade helps to increase your comfort before surgery and to decrease your nausea after surgery. The carbohydrates in Gatorade/Powerade help reduce your body's stress response to surgery.  If you are a diabetic-drink only water prior to surgery.    Outpatient Surgery- May allow 2 adult (18 and older) Support Persons (1 being the designated ) for all surgical/procedural patients. A breastfeeding mother will be allowed her infant and 2 adult Support Persons. No one under the age of 18 will be allowed in the building. No swapping out of visitors in the [facility].      SPECIAL MEDICATION INSTRUCTIONS: TAKE medications checked off by the Anesthesiologist on your Medication List.    Angiogram Patients: Take medications as instructed by your physician, including aspirin.     Surgery Patients:    If you take ASPIRIN - Your PHYSICIAN/SURGEON will need to inform you IF/OR when you need to stop taking aspirin prior to your surgery.     The week prior to surgery do not ot take any medications containing IBUPROFEN or NSAIDS ( Advil, Motrin, Goodys, BC, Aleve, Naproxen etc)  If you are not sure if you should take a medicine please call your surgeon's office.  Ok to take Tylenol    Do Not Wear any make-up (especially eye make-up) to surgery. Please remove any false eyelashes or eyelash extensions. If you arrive the day of surgery with makeup/eyelashes on you will be required to remove prior to surgery. (There is a risk of corneal abrasions if eye makeup/eyelash extensions are not removed)      Leave all valuables at home.   Do Not wear any jewelry or watches, including any metal in body piercings. Jewelry must be removed prior to coming to the hospital.  There is a possibility that rings that are unable to be removed may be cut off if they are on the surgical extremity.    Please remove all hair extensions, wigs, clips and any other metal accessories/ ornaments from your hair.  These items may pose a flammable/fire risk in Surgery and must be removed.    Do not shave your surgical area at least 5 days prior to your surgery. The surgical prep will be performed at the hospital according to Infection Control regulations.    Contact Lens must be removed before surgery. Either do not wear the contact lens or bring a case and solution for storage.  Please bring a container for eyeglasses or dentures as required.  Bring any paperwork your physician has provided, such as consent forms,  history and physicals, doctor's orders, etc.   Bring comfortable clothes that are loose fitting to wear upon discharge. Take into consideration the type of surgery being performed.  Maintain your diet as advised per your physician the day prior to surgery.      Adequate rest the night before surgery is advised.   Park in the Parking lot behind the hospital or in the [facility] across the street from the parking lot. Parking is complimentary.  If you will be discharged the same day as your procedure, please arrange for a responsible adult to drive you home or to accompany you if traveling by taxi.    YOU WILL NOT BE PERMITTED TO DRIVE OR TO LEAVE THE HOSPITAL ALONE AFTER SURGERY.   If you are being discharged the same day, it is strongly recommended that you arrange for someone to remain with you for the first 24 hrs following your surgery.    The Surgeon will speak to your family/visitor after your surgery regarding the outcome of your surgery and post op care.  The Surgeon may speak to you after your surgery, but there is a possibility you may not remember the details.  Please check with your family members regarding the conversation with the Surgeon.    We strongly recommend whoever is bringing you home be present for discharge instructions.  This will ensure a thorough understanding for your post op home care.          Thank you for your cooperation.  The Staff of [facility].            Bathing Instructions with Hibiclens    Shower the evening before and morning of your procedure with Chlorhexidine (Hibiclens)  do not use Chlorhexidine on your face or genitals. Do not get in your eyes.  Wash your face with water and your regular face wash/soap  Use your regular shampoo  Apply Chlorhexidine (Hibiclens) directly on your skin or on a wet washcloth and wash gently. When showering: Move away from the shower stream when applying Chlorhexidine (Hibiclens) to avoid rinsing off too soon.  Rinse thoroughly with warm water  Do not dilute Chlorhexidine (Hibiclens)   Dry off as usual, do not use any deodorant, powder, body lotions, perfume, after shave or cologne.

## 2024-07-30 ENCOUNTER — PATIENT MESSAGE (OUTPATIENT)
Dept: ORTHOPEDICS | Facility: CLINIC | Age: 59
End: 2024-07-30
Payer: MEDICARE

## 2024-07-30 ENCOUNTER — DOCUMENTATION ONLY (OUTPATIENT)
Dept: ORTHOPEDICS | Facility: CLINIC | Age: 59
End: 2024-07-30
Payer: MEDICARE

## 2024-07-30 ENCOUNTER — TELEPHONE (OUTPATIENT)
Dept: ORTHOPEDICS | Facility: CLINIC | Age: 59
End: 2024-07-30
Payer: MEDICARE

## 2024-07-30 NOTE — TELEPHONE ENCOUNTER
Spoke c pt. Informed pt of 6:30 arrival time for  07/31/24 surgery at the [facility]. Reminded pt of NPO status. Pt expressed understanding & was thankful.

## 2024-07-30 NOTE — PROGRESS NOTES
Spoke to patient in regards to his postoperative medications.  States that he is unable to take tramadol due to his underlying kidney disease.  Stated the patient that I am unable to give him a higher narcotic postoperatively.  Recommended we prescribe extra strength Tylenol as well as ibuprofen, but due to his underlying kidney disease unable to do ibuprofen.  Patient was verbally upset over the phone threatened to cancel surgery and threatened obtaining legal action. Will cancel surgery tomorrow.

## 2024-07-31 ENCOUNTER — TELEPHONE (OUTPATIENT)
Dept: ORTHOPEDICS | Facility: CLINIC | Age: 59
End: 2024-07-31
Payer: MEDICARE

## 2024-07-31 NOTE — TELEPHONE ENCOUNTER
"Per Dr. Garcia, informed pt that his surgery tomorrow had in fact been canceled due to the threatening language used with his previous call/encounter with our PA-C, Papo Díaz. Pt raised his voice at me stating that, "he didn't threaten Peter!" Pt stated he didn't appreciate being called a liar in their conversation re: pain medications to which I replied that I was not a part of that conversation and reiterated the simple fact that his surgery would not be performed tomorrow (today). Pt requested that I repeat my name and stated "you will be hearing from someone" before hanging up.     ----- Message from Roxanna Palacios sent at 7/30/2024  4:54 PM CDT -----    ----- Message -----  From: Marah Louis  Sent: 7/30/2024   4:40 PM CDT  To: Radha HERNANDEZ Staff    Type: Patient Call Back    Who called:pt     What is the request in detail:pt calling to see if his surgery is still schedule for tomorrow. Call pt     Can the clinic reply by [facility]?    Would the patient rather a call back or a response via [facility]? call    Best call back number:[phone] (home)       Additional Information:  "

## 2024-08-16 DIAGNOSIS — I10 BENIGN ESSENTIAL HTN: ICD-10-CM

## 2024-08-16 DIAGNOSIS — M10.9 GOUT, UNSPECIFIED CAUSE, UNSPECIFIED CHRONICITY, UNSPECIFIED SITE: ICD-10-CM

## 2024-08-16 RX ORDER — OLMESARTAN MEDOXOMIL 40 MG/1
40 TABLET ORAL DAILY
Qty: 90 TABLET | Refills: 3 | Status: SHIPPED | OUTPATIENT
Start: 2024-08-16

## 2024-08-16 NOTE — TELEPHONE ENCOUNTER
Care Due:                  Date            Visit Type   Department     Provider  --------------------------------------------------------------------------------                                EP -                              PRIMARY      [facility] INTER[facility]  Last Visit: 05-      CARE ([facility])   MEDICINE       Ivan Thornton  Next Visit: None Scheduled  None         None Found                                                            Last  Test          Frequency    Reason                     Performed    Due Date  --------------------------------------------------------------------------------    CMP.........  12 months..  allopurinoL..............  06- 06-    Uric Acid...  12 months..  allopurinoL, colchicine,.  09- 08-    [facility] Embedded Care Due Messages. Reference number: [id number].   8/16/2024 12:57:42 PM CDT

## 2024-08-16 NOTE — TELEPHONE ENCOUNTER
No care due was identified.  [facility] Embedded Care Due Messages. Reference number: [id number].   8/16/2024 1:01:21 PM CDT

## 2024-08-17 RX ORDER — ALLOPURINOL 100 MG/1
TABLET ORAL
Qty: 135 TABLET | Refills: 3 | OUTPATIENT
Start: 2024-08-17

## 2024-08-17 NOTE — TELEPHONE ENCOUNTER
Refill Routing Note   Medication(s) are not appropriate for processing by [facility] for the following reason(s):        Required labs outdated  Required labs abnormal  No active prescription written by provider    [facility] action(s):  Defer        Medication Therapy Plan: Hydralazine- prescription ; DEFER      Appointments  past 12m or future 3m with PCP    Date Provider   Last Visit   2024 Ivan Thornton MD   Next Visit   Visit date not found Ivan Thornton MD   ED visits in past 90 days: 0        Note composed:9:35 AM 2024

## 2024-08-17 NOTE — TELEPHONE ENCOUNTER
Provider Staff:  Action required for this patient    Requires labs      Please see care gap opportunities below in Care Due Message.    Thanks!  [facility]     Appointments      Date Provider   Last Visit   5/20/2024 Ivan Thornton MD   Next Visit   8/16/2024 Ivan Thornton MD     Refill Decision Note   Fausto Pandey  is requesting a refill authorization.  Brief Assessment and Rationale for Refill:  Quick Discontinue     Medication Therapy Plan:         Comments:     Note composed:9:08 AM 08/17/2024

## 2024-08-19 RX ORDER — ALLOPURINOL 100 MG/1
150 TABLET ORAL DAILY
Qty: 135 TABLET | Refills: 3 | Status: SHIPPED | OUTPATIENT
Start: 2024-08-19

## 2024-08-19 RX ORDER — HYDRALAZINE HYDROCHLORIDE 100 MG/1
100 TABLET, FILM COATED ORAL EVERY 8 HOURS
Qty: 270 TABLET | Refills: 3 | Status: SHIPPED | OUTPATIENT
Start: 2024-08-19 | End: 2025-08-19

## 2024-09-04 ENCOUNTER — PATIENT MESSAGE (OUTPATIENT)
Facility: CLINIC | Age: 59
End: 2024-09-04
Payer: MEDICARE

## 2024-09-05 ENCOUNTER — TELEPHONE (OUTPATIENT)
Dept: NEPHROLOGY | Facility: CLINIC | Age: 59
End: 2024-09-05
Payer: MEDICARE

## 2024-09-24 ENCOUNTER — PATIENT MESSAGE (OUTPATIENT)
Facility: CLINIC | Age: 59
End: 2024-09-24
Payer: MEDICARE

## 2024-09-25 ENCOUNTER — TELEPHONE (OUTPATIENT)
Facility: CLINIC | Age: 59
End: 2024-09-25
Payer: MEDICARE

## 2024-09-25 NOTE — TELEPHONE ENCOUNTER
LVM with myChart tech support number and [facility] phone number if patient needs to reschedule kidney class tomorrow at 10am.

## 2024-10-07 ENCOUNTER — PATIENT MESSAGE (OUTPATIENT)
Facility: CLINIC | Age: 59
End: 2024-10-07
Payer: MEDICARE

## 2024-10-14 ENCOUNTER — TELEPHONE (OUTPATIENT)
Facility: CLINIC | Age: 59
End: 2024-10-14
Payer: MEDICARE

## 2024-10-18 ENCOUNTER — LAB VISIT (OUTPATIENT)
Dept: LAB | Facility: OTHER | Age: 59
End: 2024-10-18
Attending: INTERNAL MEDICINE
Payer: MEDICARE

## 2024-10-18 DIAGNOSIS — N18.32 STAGE 3B CHRONIC KIDNEY DISEASE: ICD-10-CM

## 2024-10-18 LAB
ALBUMIN/CREAT UR: 29.9 UG/MG (ref 0–30)
BILIRUB UR QL STRIP: NEGATIVE
CLARITY UR: CLEAR
COLOR UR: YELLOW
CREAT UR-MCNC: 203.7 MG/DL (ref 23–375)
CREAT UR-MCNC: 203.7 MG/DL (ref 23–375)
GLUCOSE UR QL STRIP: NEGATIVE
HGB UR QL STRIP: NEGATIVE
KETONES UR QL STRIP: NEGATIVE
LEUKOCYTE ESTERASE UR QL STRIP: NEGATIVE
MICROALBUMIN UR DL<=1MG/L-MCNC: 61 UG/ML
NITRITE UR QL STRIP: NEGATIVE
PH UR STRIP: 6 [PH] (ref 5–8)
PROT UR QL STRIP: NEGATIVE
PROT UR-MCNC: 17 MG/DL (ref 0–15)
PROT/CREAT UR: 0.08 MG/G{CREAT} (ref 0–0.2)
SP GR UR STRIP: 1.02 (ref 1–1.03)
URN SPEC COLLECT METH UR: NORMAL
UROBILINOGEN UR STRIP-ACNC: NEGATIVE EU/DL

## 2024-10-18 PROCEDURE — 82043 UR ALBUMIN QUANTITATIVE: CPT | Performed by: NURSE PRACTITIONER

## 2024-10-18 PROCEDURE — 84156 ASSAY OF PROTEIN URINE: CPT | Performed by: NURSE PRACTITIONER

## 2024-10-18 PROCEDURE — 82570 ASSAY OF URINE CREATININE: CPT | Performed by: NURSE PRACTITIONER

## 2024-10-18 PROCEDURE — 81003 URINALYSIS AUTO W/O SCOPE: CPT | Performed by: NURSE PRACTITIONER

## 2024-10-25 ENCOUNTER — PATIENT MESSAGE (OUTPATIENT)
Dept: NEPHROLOGY | Facility: CLINIC | Age: 59
End: 2024-10-25
Payer: MEDICARE

## 2024-11-06 ENCOUNTER — OFFICE VISIT (OUTPATIENT)
Dept: NEPHROLOGY | Facility: CLINIC | Age: 59
End: 2024-11-06
Payer: MEDICARE

## 2024-11-06 VITALS
HEART RATE: 75 BPM | OXYGEN SATURATION: 98 % | HEIGHT: 74 IN | DIASTOLIC BLOOD PRESSURE: 88 MMHG | WEIGHT: 315 LBS | SYSTOLIC BLOOD PRESSURE: 147 MMHG | BODY MASS INDEX: 40.43 KG/M2

## 2024-11-06 DIAGNOSIS — N18.4 CHRONIC KIDNEY DISEASE (CKD), STAGE IV (SEVERE): Primary | ICD-10-CM

## 2024-11-06 DIAGNOSIS — I10 HYPERTENSION, UNSPECIFIED TYPE: ICD-10-CM

## 2024-11-06 DIAGNOSIS — E66.01 MORBID OBESITY WITH BMI OF 45.0-49.9, ADULT: ICD-10-CM

## 2024-11-06 DIAGNOSIS — E66.01 MORBID OBESITY: ICD-10-CM

## 2024-11-06 DIAGNOSIS — N25.81 SECONDARY HYPERPARATHYROIDISM: ICD-10-CM

## 2024-11-06 PROCEDURE — G2211 COMPLEX E/M VISIT ADD ON: HCPCS | Mod: S$PBB,,, | Performed by: NURSE PRACTITIONER

## 2024-11-06 PROCEDURE — 99213 OFFICE O/P EST LOW 20 MIN: CPT | Mod: PBBFAC | Performed by: NURSE PRACTITIONER

## 2024-11-06 PROCEDURE — 99214 OFFICE O/P EST MOD 30 MIN: CPT | Mod: S$PBB,,, | Performed by: NURSE PRACTITIONER

## 2024-11-06 PROCEDURE — 99999 PR PBB SHADOW E&M-EST. PATIENT-LVL III: CPT | Mod: PBBFAC,,, | Performed by: NURSE PRACTITIONER

## 2024-11-06 RX ORDER — METOPROLOL SUCCINATE 50 MG/1
50 TABLET, EXTENDED RELEASE ORAL DAILY
Qty: 90 TABLET | Refills: 3 | Status: SHIPPED | OUTPATIENT
Start: 2024-11-06 | End: 2025-11-06

## 2024-11-06 NOTE — PROGRESS NOTES
Subjective:       Patient ID: Fausto Pandey is a 59 y.o. AA male who presents for follow-up evaluation of   CKD    HPI     Last seen in October 2020.     Patient presents for new evaluation of CKD.  Baseline creatinine difficult to determine due to infrequency of available labs; appears to be 2.5-2.7 mg/dL.      Recently with breast tenderness and was instructed to stop spironolactone.    Home BPs: 120s/80s    Significant hx includes HTN.    Social history: smokes ~4 cigarettes daily; no ETOH     The patient denies taking NSAIDs. Previously said he uses marijuana.     Significant family hx includes: HTN; no known renal disease     Last renal doppler US: November 2020, reviewed.    Update 12/10/21:  Last seen April 2021.  Baseline sCr: 2.5-2.7 mg/dL.  Home BPs: 140s/80s after  Denies NSAID use. Denies recent hospitalizations.  Still smoking cigarettes (no longer daily). Still smoking marijuana.  Plans for inguinal hernia surgery this month; has been having significant groin pain that impairs ability to exercise.    Update 4/8/22:  Returns for f/u of CKD.  Baseline sCr: 2.5-2.7 mg/dL, but recent levels have been in the 2.4 range.  Home BPs: 150/80s at home.  Went to ER with back pain. Says muscle relaxers and patch prescribed are not helping. Said tylenol helped in ER but not helping at home. Unsure if it is a different dose.  Has started walking on the treadmill again, trying to lose weight. Unable to right now due to back pain.    Update 7/25/22:  Returns for f/u of CKD.  Baseline sCr: 2.5-2.7 mg/dL.  Started chlorthalidone last visit.    Home BPs: 130s/80s   Still smoking, but has decreased.  Started exercising again recently.    Denies NSAIDs. Denies hospitalizations.    Update 10/28/22:  Returns for f/u of CKD.  Baseline sCr: 2.5-2.7 mg/dL.  Home BPs: has not been taking    Quit smoking cigarettes on 8/27.  Says he is exercising. Has gym membership.    Update 1/30/23:  Returns for f/u of CKD.  Baseline  "sCr: 2.5-2.7 mg/dL.  Home BPs: has not been taking  Walks for exercise. Has cut out fried food. Working on weight loss.    Update 4/5/23:  Returns for f/u of CKD.  Baseline sCr: 2.5-2.7 mg/dL.  Home BPs: 150s/90s (lots of stress recently); prior to stress was 130s/90s  Still walking for exercise. Working on diet.    Update 6/26/24:  Returns for f/u of CKD.  Baseline sCr: 2.5-2.7 mg/dL. No recent labs.  Takes tylenol for pain.    Update 11/6/24:  Returns for f/u of CKD.  Baseline sCr: 2.5-2.7 mg/dL.  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  Has lost 20lb through diet and exercise.      Review of Systems   Respiratory: Negative for shortness of breath.    Cardiovascular: Some BLE swelling if he's on his feet all day  Gastrointestinal: Negative for diarrhea, nausea and vomiting.   Genitourinary: Negative for difficulty urinating, dysuria, hematuria.         Objective:       Blood pressure (!) 147/88, pulse 75, height 6' 2" (1.88 m), weight (!) 160.1 kg (352 lb 15.3 oz), SpO2 98%.  Physical Exam  Vitals reviewed.   Constitutional:       General: He is not in acute distress.     Appearance: Normal appearance. He is well-developed. He is obese. He is not ill-appearing or diaphoretic.   Cardiovascular:      Rate and Rhythm: Normal rate.   Pulmonary:      Effort: Pulmonary effort is normal. No respiratory distress.   Musculoskeletal:      Cervical back: Neck supple.      Right lower leg: no edema.      Left lower leg: no edema.   Skin:     General: Skin is warm and dry.   Neurological:      Mental Status: He is alert and oriented to person, place, and time.   Psychiatric:         Mood and Affect: Mood normal.         Behavior: Behavior normal.         Thought Content: Thought content normal.         Judgment: Judgment normal.           Lab Results   Component Value Date    CREATININE 2.5 (H) 10/18/2024    URICACID 6.3 09/05/2023     Prot/Creat Ratio, Urine   Date " Value Ref Range Status   10/18/2024 0.08 0.00 - 0.20 Final   07/10/2024 0.11 0.00 - 0.20 Final   09/05/2023 0.11 0.00 - 0.20 Final     Lab Results   Component Value Date     10/18/2024    K 3.8 10/18/2024    CO2 28 10/18/2024     10/18/2024     Lab Results   Component Value Date    .6 (H) 10/18/2024    CALCIUM 9.7 10/18/2024    PHOS 3.7 10/18/2024     Lab Results   Component Value Date    HGB 13.3 (L) 10/18/2024    WBC 6.24 10/18/2024    HCT 41.9 10/18/2024      Lab Results   Component Value Date    HGBA1C 4.9 02/15/2022     10/18/2024    BUN 19 10/18/2024     Lab Results   Component Value Date    LDLCALC 124.2 03/31/2023         Assessment:       1. Chronic kidney disease (CKD), stage IV (severe)    2. Morbid obesity with BMI of 45.0-49.9, adult    3. Hypertension, unspecified type    4. Morbid obesity    5. Secondary hyperparathyroidism                  Plan:   CKD stage 4 - clinically 2/2 longstanding poorly controlled HTN +/- obesity. Stable. Non-proteinuric.  Educated patient to control BP, BG, remain well-hydrated, and avoid NSAIDs to prevent progression of CKD.  Moderate risk of progression to ESRD in the next 5 years.    He has quit smoking cigarettes.  He has lost 20 lbs through diet and exercise.    UPCR Previously proteinuric but it improved. On olmesartan   Acid-base WNL   Renal osteodystrophy Ca, phos okay. PTH elevated. On Vitamin D 2000 daily OTC.   Anemia WNL   DM N/a   Lipid Management On statin now. Recommend avoiding fenofibrate.   ESRD planning Defer    Kidney Failure Risk Equation (Tangri)    Kidney Failure Risk at 2 years: 3.7%    Kidney Failure Risk at 5 years: 11.2%    Lab Results   Component Value Date    MICALBCREAT 29.9 10/18/2024    CREATININE 2.5 (H) 10/18/2024           HTN - High on amlodipine 10 mg, chlorthalidone 25 mg, hydralazine 100 mg TID, olmesartan 40 mg, metoprolol succinate 25 mg   - now off Hctz; says back pain has improved off it; also off  spironolactone 2/2 breast tenderness  - Thinks he had side effects with nifedipine previously  - Increase metoprolol succinate to 50 mg    Morbid obesity -  Applauded his weight loss efforts. His goal is to be able to ride the the rides at [employer] with his kids next Fall. Briefly discussed adding a GLP1a, but he is not interested    All questions patient had were answered.  Asked if further questions. None. F/u in clinic in 4 mos with labs and urine prior to next visit or sooner if needed.  ER for emergency concerns.     Summary of Plan:  Increase metoprolol succinate to 50 mg  E-visit in 2 weeks to check BPs  avoid NSAID/ bactrim/ IV contrast/ gadolinium/ aminoglycoside where possible  RTC in 3 mos (virtual)      Visit today included increased complexity associated with managing the longitudinal care of the patient due to the serious and/or complex managed problem(s) CKD.

## 2024-11-06 NOTE — PATIENT INSTRUCTIONS
Increase metoprolol succinate to 50 mg. Take two pills of what you have at home until you run out, then you can start the single 50 mg pill  E-visit in 2 weeks to check BPs  Start looking for a nephrologist in [place] now

## 2024-11-20 ENCOUNTER — E-VISIT (OUTPATIENT)
Dept: NEPHROLOGY | Facility: CLINIC | Age: 59
End: 2024-11-20
Payer: MEDICARE

## 2024-11-20 DIAGNOSIS — I10 HYPERTENSION, UNSPECIFIED TYPE: Primary | ICD-10-CM

## 2024-11-21 NOTE — PROGRESS NOTES
Patient ID: Fausto Pandey is a 59 y.o. male.    Chief Complaint: Hypertension (Entered automatically based on patient selection in [employer].)    The patient initiated a request through [employer] on 11/20/2024 for evaluation and management with a chief complaint of Hypertension (Entered automatically based on patient selection in [employer].)     I evaluated the questionnaire submission on 11/21/2024  .    [facility] Peq Evisit Hypertension    11/21/2024 12:04 AM CST - Filed by Patient   Do you agree to participate in an E-Visit? Yes   If you have any of the following symptoms, please do not complete an E-Visit. Instead, schedule an appointment with your provider I acknowledge   Choose the state of your primary residence [place]   What would you like addressed about your blood pressure? New concern   What is the main issue you would like addressed today? Blood pressure   Your blood pressure is a: Chronic problem   When were you first diagnosed with high blood pressure? More than 1 year ago   Since you first learned you had high blood pressure, how has it changed? Always present but gets better and worse   How would you classify your blood pressure? Well controlled   Are you having any of the following symptoms from your high blood pressure? Leg swelling   Are you taking any of the following medications? None of these   The following factors can make high blood pressure worse or harder to control. Which of them might be contributing to your high blood pressure?  High salt diet;  Excess weight;  Lack of exercise   Have you taken blood pressure medications in the past that caused you problems or side effects? No   Are you currently taking medication(s) for your blood pressure? Yes   Have you recently started a new medication or changed your dose? Yes   How often are you taking your medication per week?  Every day   Have you had any side effects from your current blood pressure medication? No   Are you able to take your  blood pressure? Yes   Please give your most recent blood pressure readings    Reading 1    Reading 2    Reading 3    Reading 4    Reading 5    If you are able to take your pulse, please provide it below.    Provide any additional information you feel is important.    Please attach any relevant images or files    Are you able to take any other vitals? No         No diagnosis found.     No orders of the defined types were placed in this encounter.           No follow-ups on file.      E-Visit Time Tracking:    Day 2 Time (in minutes): 2    Total Time (in minutes): 2

## 2025-01-25 DIAGNOSIS — I10 BENIGN ESSENTIAL HTN: ICD-10-CM

## 2025-01-27 RX ORDER — CHLORTHALIDONE 25 MG/1
25 TABLET ORAL
Qty: 90 TABLET | Refills: 2 | Status: SHIPPED | OUTPATIENT
Start: 2025-01-27

## 2025-01-27 NOTE — TELEPHONE ENCOUNTER
Refill Routing Note   Medication(s) are not appropriate for processing by [facility] for the following reason(s):      Required vitals abnormal    [facility] action(s):  Defer Care Due:  Labs due            Appointments  past 12m or future 3m with PCP    Date Provider   Last Visit   5/20/2024 Ivan Thornton MD   Next Visit   Visit date not found Ivan Thornton MD   ED visits in past 90 days: 0        Note composed:8:57 PM 01/26/2025

## 2025-02-04 ENCOUNTER — LAB VISIT (OUTPATIENT)
Dept: LAB | Facility: OTHER | Age: 60
End: 2025-02-04
Attending: NURSE PRACTITIONER
Payer: MEDICARE

## 2025-02-04 DIAGNOSIS — N18.4 CHRONIC KIDNEY DISEASE (CKD), STAGE IV (SEVERE): ICD-10-CM

## 2025-02-04 LAB
ALBUMIN/CREAT UR: 47.6 UG/MG (ref 0–30)
BILIRUB UR QL STRIP: NEGATIVE
CLARITY UR: CLEAR
COLOR UR: YELLOW
CREAT UR-MCNC: 140.7 MG/DL (ref 23–375)
CREAT UR-MCNC: 145.7 MG/DL (ref 23–375)
GLUCOSE UR QL STRIP: NEGATIVE
HGB UR QL STRIP: NEGATIVE
KETONES UR QL STRIP: NEGATIVE
LEUKOCYTE ESTERASE UR QL STRIP: NEGATIVE
MICROALBUMIN UR DL<=1MG/L-MCNC: 67 UG/ML
NITRITE UR QL STRIP: NEGATIVE
PH UR STRIP: 6 [PH] (ref 5–8)
PROT UR QL STRIP: NEGATIVE
PROT UR-MCNC: 14 MG/DL (ref 0–15)
PROT/CREAT UR: 0.1 MG/G{CREAT} (ref 0–0.2)
SP GR UR STRIP: 1.01 (ref 1–1.03)
URN SPEC COLLECT METH UR: NORMAL
UROBILINOGEN UR STRIP-ACNC: NEGATIVE EU/DL

## 2025-02-04 PROCEDURE — 82570 ASSAY OF URINE CREATININE: CPT | Performed by: NURSE PRACTITIONER

## 2025-02-04 PROCEDURE — 82570 ASSAY OF URINE CREATININE: CPT | Mod: 91 | Performed by: NURSE PRACTITIONER

## 2025-02-04 PROCEDURE — 81003 URINALYSIS AUTO W/O SCOPE: CPT | Performed by: NURSE PRACTITIONER

## 2025-02-11 ENCOUNTER — OFFICE VISIT (OUTPATIENT)
Dept: NEPHROLOGY | Facility: CLINIC | Age: 60
End: 2025-02-11
Payer: MEDICARE

## 2025-02-11 DIAGNOSIS — N18.32 STAGE 3B CHRONIC KIDNEY DISEASE: Primary | ICD-10-CM

## 2025-02-11 DIAGNOSIS — N25.81 SECONDARY HYPERPARATHYROIDISM: ICD-10-CM

## 2025-02-11 DIAGNOSIS — E66.01 MORBID OBESITY: ICD-10-CM

## 2025-02-11 DIAGNOSIS — I10 HYPERTENSION, UNSPECIFIED TYPE: ICD-10-CM

## 2025-02-11 DIAGNOSIS — R80.9 PROTEINURIA, UNSPECIFIED TYPE: ICD-10-CM

## 2025-02-11 PROBLEM — N18.4 CHRONIC KIDNEY DISEASE (CKD), STAGE IV (SEVERE): Status: RESOLVED | Noted: 2024-11-06 | Resolved: 2025-02-11

## 2025-02-11 PROCEDURE — G2211 COMPLEX E/M VISIT ADD ON: HCPCS | Mod: 95,,, | Performed by: NURSE PRACTITIONER

## 2025-02-11 PROCEDURE — 98006 SYNCH AUDIO-VIDEO EST MOD 30: CPT | Mod: 95,,, | Performed by: NURSE PRACTITIONER

## 2025-02-11 RX ORDER — DIPHENHYDRAMINE HCL 25 MG
25 CAPSULE ORAL 2 TIMES DAILY
COMMUNITY

## 2025-02-11 NOTE — PROGRESS NOTES
Subjective:       Patient ID: Fausto Pandey is a 59 y.o. AA male who presents for follow-up evaluation of   CKD    HPI     Last seen in October 2020.     Patient presents for new evaluation of CKD.  Baseline creatinine difficult to determine due to infrequency of available labs; appears to be 2.5-2.7 mg/dL.      Recently with breast tenderness and was instructed to stop spironolactone.    Home BPs: 120s/80s    Significant hx includes HTN.    Social history: smokes ~4 cigarettes daily; no ETOH     The patient denies taking NSAIDs. Previously said he uses marijuana.     Significant family hx includes: HTN; no known renal disease     Last renal doppler US: November 2020, reviewed.    Update 12/10/21:  Last seen April 2021.  Baseline sCr: 2.5-2.7 mg/dL.  Home BPs: 140s/80s after  Denies NSAID use. Denies recent hospitalizations.  Still smoking cigarettes (no longer daily). Still smoking marijuana.  Plans for inguinal hernia surgery this month; has been having significant groin pain that impairs ability to exercise.    Update 4/8/22:  Returns for f/u of CKD.  Baseline sCr: 2.5-2.7 mg/dL, but recent levels have been in the 2.4 range.  Home BPs: 150/80s at home.  Went to ER with back pain. Says muscle relaxers and patch prescribed are not helping. Said tylenol helped in ER but not helping at home. Unsure if it is a different dose.  Has started walking on the treadmill again, trying to lose weight. Unable to right now due to back pain.    Update 7/25/22:  Returns for f/u of CKD.  Baseline sCr: 2.5-2.7 mg/dL.  Started chlorthalidone last visit.    Home BPs: 130s/80s   Still smoking, but has decreased.  Started exercising again recently.    Denies NSAIDs. Denies hospitalizations.    Update 10/28/22:  Returns for f/u of CKD.  Baseline sCr: 2.5-2.7 mg/dL.  Home BPs: has not been taking    Quit smoking cigarettes on 8/27.  Says he is exercising. Has gym membership.    Update 1/30/23:  Returns for f/u of CKD.  Baseline  sCr: 2.5-2.7 mg/dL.  Home BPs: has not been taking  Walks for exercise. Has cut out fried food. Working on weight loss.    Update 4/5/23:  Returns for f/u of CKD.  Baseline sCr: 2.5-2.7 mg/dL.  Home BPs: 150s/90s (lots of stress recently); prior to stress was 130s/90s  Still walking for exercise. Working on diet.    Update 6/26/24:  Returns for f/u of CKD.  Baseline sCr: 2.5-2.7 mg/dL. No recent labs.  Takes tylenol for pain.    Update 11/6/24:  Returns for f/u of CKD.  Baseline sCr: 2.5-2.7 mg/dL.  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  Has lost 20lb through diet and exercise.    Update 2/11/25:  The patient location is: [place]  The chief complaint leading to consultation is: f/u of CKD    Visit type: audiovisual    Face to Face time with patient: 11 min  25 minutes of total time spent on the encounter, which includes face to face time and non-face to face time preparing to see the patient (eg, review of tests), Obtaining and/or reviewing separately obtained history, Documenting clinical information in the electronic or other health record, Independently interpreting results (not separately reported) and communicating results to the patient/family/caregiver, or Care coordination (not separately reported).     Each patient to whom he or she provides medical services by telemedicine is:  (1) informed of the relationship between the physician and patient and the respective role of any other health care provider with respect to management of the patient; and (2) notified that he or she may decline to receive medical services by telemedicine and may withdraw from such care at any time.    Notes:   Returns for f/u of CKD.  Baseline sCr: 2.5-2.7 mg/dL. Most recent sCr was 2.4 mg/dL.  Home BPs: 139/77 before medication  Has lost a total of 25 lbs through diet and exercise.  Back pain improved with weight loss.    Review of Systems   Respiratory: Negative for  shortness of breath.    Cardiovascular: Some BLE swelling if standing too long.  Gastrointestinal: Negative for diarrhea, nausea and vomiting.   Genitourinary: Negative for difficulty urinating, dysuria, hematuria.         Objective:       There were no vitals taken for this visit.  Physical Exam  Vitals reviewed.   Constitutional:       General: He is not in acute distress.     Appearance: Normal appearance. He is well-developed. He is obese. He is not ill-appearing or diaphoretic.     Pulmonary:      Effort: Pulmonary effort is normal. No respiratory distress.       Neurological:      Mental Status: He is alert and oriented to person, place, and time.   Psychiatric:         Mood and Affect: Mood normal.         Behavior: Behavior normal.         Thought Content: Thought content normal.         Judgment: Judgment normal.           Lab Results   Component Value Date    CREATININE 2.4 (H) 02/04/2025    URICACID 6.3 09/05/2023     Prot/Creat Ratio, Urine   Date Value Ref Range Status   02/04/2025 0.10 0.00 - 0.20 Final   10/18/2024 0.08 0.00 - 0.20 Final   07/10/2024 0.11 0.00 - 0.20 Final     Lab Results   Component Value Date     02/04/2025    K 4.2 02/04/2025    CO2 27 02/04/2025     02/04/2025     Lab Results   Component Value Date    .1 (H) 02/04/2025    CALCIUM 9.8 02/04/2025    PHOS 3.6 02/04/2025     Lab Results   Component Value Date    HGB 14.3 02/04/2025    WBC 6.17 02/04/2025    HCT 44.2 02/04/2025      Lab Results   Component Value Date    HGBA1C 4.9 02/15/2022     02/04/2025    BUN 26 (H) 02/04/2025     Lab Results   Component Value Date    LDLCALC 124.2 03/31/2023         Assessment:       1. Stage 3b chronic kidney disease    2. Hypertension, unspecified type    3. Morbid obesity    4. Secondary hyperparathyroidism    5. Proteinuria, unspecified type                    Plan:   CKD stage 4 - clinically 2/2 longstanding poorly controlled HTN +/- obesity. Stable. Moderate risk of  ESRD in the next 5 years.        He has quit smoking cigarettes.  He has lost 25 lbs through diet and exercise.    UPCR Mild albuminuria.  On olmesartan. Monitor. Consider addition of eplerenone vs. SGLT2i next visit.   Acid-base WNL   Renal osteodystrophy Ca, phos okay. PTH elevated. Vit D okay. On Vitamin D 2000 daily OTC.   Anemia Hgb WNL   DM N/a   Lipid Management On statin now. Recommend avoiding fenofibrate.   ESRD planning Defer    Kidney Failure Risk Equation (Tangri)    Kidney Failure Risk at 2 years: 3.7%    Kidney Failure Risk at 5 years: 11.1%    Lab Results   Component Value Date    MICALBCREAT 47.6 (H) 02/04/2025    CREATININE 2.4 (H) 02/04/2025           HTN - Slightly above goal in the morning. On amlodipine 10 mg, chlorthalidone 25 mg, hydralazine 100 mg TID, olmesartan 40 mg, metoprolol succinate 50 mg   - now off Hctz; says back pain has improved off it; also off spironolactone 2/2 breast tenderness  - Thinks he had side effects with nifedipine previously  - Monitor BP before and after medications a few times a week. Write down results. Bring to appointments. Consider eplerenone in future.    Morbid obesity -  Applauded his weight loss efforts. His goal is to be able to ride the the rides at [employer] with his kids next Fall. Briefly discussed adding a GLP1a, but he is not interested at present.    All questions patient had were answered.  Asked if further questions. None. F/u in clinic in 4 mos with labs and urine prior to next visit or sooner if needed.  ER for emergency concerns.     Summary of Plan:  Monitor home BPs. Bring results to appts  Consider eplerenone vs. SGLT2i at next visit  avoid NSAID/ bactrim/ IV contrast/ gadolinium/ aminoglycoside where possible  Continue weight loss  RTC in 4 mos.       Visit today included increased complexity associated with managing the longitudinal care of the patient due to the serious and/or complex managed problem(s) CKD.

## 2025-04-11 DIAGNOSIS — I10 BENIGN ESSENTIAL HTN: ICD-10-CM

## 2025-04-11 NOTE — TELEPHONE ENCOUNTER
Refill Routing Note   Medication(s) are not appropriate for processing by [facility] for the following reason(s):        Required vitals abnormal    [facility] action(s):  Defer     Requires labs : Yes             Appointments  past 12m or future 3m with PCP    Date Provider   Last Visit   5/20/2024 Ivan Thornton MD   Next Visit   Visit date not found Ivan Thornton MD   ED visits in past 90 days: 0        Note composed:7:19 AM 04/11/2025                 pt resting in cart in no distress, urinal at bedside. seen by social work, pt to be placed in long term placement and possibly held in observation until tomorrow.

## 2025-04-11 NOTE — TELEPHONE ENCOUNTER
Care Due:                  Date            Visit Type   Department     Provider  --------------------------------------------------------------------------------                                EP -                              PRIMARY      [facility]  Last Visit: 05-      CARE ([facility])   MEDICINE       Ivan Thornton  Next Visit: None Scheduled  None         None Found                                                            Last  Test          Frequency    Reason                     Performed    Due Date  --------------------------------------------------------------------------------    CMP.........  12 months..  allopurinoL..............  06- 06-    Uric Acid...  12 months..  allopurinoL, colchicine,.  09- 08-    [facility] Embedded Care Due Messages. Reference number: [id number].   4/11/2025 5:14:44 AM CDT

## 2025-04-12 ENCOUNTER — PATIENT MESSAGE (OUTPATIENT)
Dept: ADMINISTRATIVE | Facility: HOSPITAL | Age: 60
End: 2025-04-12
Payer: MEDICARE

## 2025-04-13 DIAGNOSIS — Z12.11 SCREENING FOR COLON CANCER: ICD-10-CM

## 2025-04-13 RX ORDER — AMLODIPINE BESYLATE 10 MG/1
10 TABLET ORAL
Qty: 90 TABLET | Refills: 0 | Status: SHIPPED | OUTPATIENT
Start: 2025-04-13

## 2025-05-08 ENCOUNTER — PATIENT MESSAGE (OUTPATIENT)
Dept: INTERNAL MEDICINE | Facility: CLINIC | Age: 60
End: 2025-05-08
Payer: MEDICARE

## 2025-06-10 ENCOUNTER — APPOINTMENT (OUTPATIENT)
Dept: LAB | Facility: OTHER | Age: 60
End: 2025-06-10
Attending: NURSE PRACTITIONER
Payer: MEDICARE

## 2025-06-17 ENCOUNTER — TELEPHONE (OUTPATIENT)
Dept: RHEUMATOLOGY | Facility: CLINIC | Age: 60
End: 2025-06-17
Payer: MEDICARE

## 2025-07-08 ENCOUNTER — OFFICE VISIT (OUTPATIENT)
Dept: NEPHROLOGY | Facility: CLINIC | Age: 60
End: 2025-07-08
Payer: MEDICARE

## 2025-07-08 DIAGNOSIS — I10 HYPERTENSION, UNSPECIFIED TYPE: Primary | ICD-10-CM

## 2025-07-08 DIAGNOSIS — E66.01 MORBID OBESITY: ICD-10-CM

## 2025-07-08 DIAGNOSIS — N18.4 CHRONIC KIDNEY DISEASE (CKD), STAGE IV (SEVERE): ICD-10-CM

## 2025-07-08 DIAGNOSIS — R80.9 PROTEINURIA, UNSPECIFIED TYPE: ICD-10-CM

## 2025-07-08 DIAGNOSIS — N25.81 SECONDARY HYPERPARATHYROIDISM: ICD-10-CM

## 2025-07-08 PROCEDURE — 98006 SYNCH AUDIO-VIDEO EST MOD 30: CPT | Mod: 95,,, | Performed by: NURSE PRACTITIONER

## 2025-07-08 PROCEDURE — G2211 COMPLEX E/M VISIT ADD ON: HCPCS | Mod: 95,,, | Performed by: NURSE PRACTITIONER

## 2025-07-08 NOTE — PROGRESS NOTES
The patient location is: [place]  The chief complaint leading to consultation is: f/u CKD    Visit type: audiovisual    Face to Face time with patient: 4 min  9 minutes of total time spent on the encounter, which includes face to face time and non-face to face time preparing to see the patient (eg, review of tests), Obtaining and/or reviewing separately obtained history, Documenting clinical information in the electronic or other health record, Independently interpreting results (not separately reported) and communicating results to the patient/family/caregiver, or Care coordination (not separately reported).         Each patient to whom he or she provides medical services by telemedicine is:  (1) informed of the relationship between the physician and patient and the respective role of any other health care provider with respect to management of the patient; and (2) notified that he or she may decline to receive medical services by telemedicine and may withdraw from such care at any time.    Notes:   Subjective:       Patient ID: Fausto Pandey is a 59 y.o. AA male who presents for follow-up evaluation of   CKD    HPI     Last seen in October 2020.     Patient presents for new evaluation of CKD.  Baseline creatinine difficult to determine due to infrequency of available labs; appears to be 2.5-2.7 mg/dL.      Recently with breast tenderness and was instructed to stop spironolactone.    Home BPs: 120s/80s    Significant hx includes HTN.    Social history: smokes ~4 cigarettes daily; no ETOH     The patient denies taking NSAIDs. Previously said he uses marijuana.     Significant family hx includes: HTN; no known renal disease     Last renal doppler US: November 2020, reviewed.    Update 12/10/21:  Last seen April 2021.  Baseline sCr: 2.5-2.7 mg/dL.  Home BPs: 140s/80s after  Denies NSAID use. Denies recent hospitalizations.  Still smoking cigarettes (no longer daily). Still smoking marijuana.  Plans for inguinal  hernia surgery this month; has been having significant groin pain that impairs ability to exercise.    Update 4/8/22:  Returns for f/u of CKD.  Baseline sCr: 2.5-2.7 mg/dL, but recent levels have been in the 2.4 range.  Home BPs: 150/80s at home.  Went to ER with back pain. Says muscle relaxers and patch prescribed are not helping. Said tylenol helped in ER but not helping at home. Unsure if it is a different dose.  Has started walking on the treadmill again, trying to lose weight. Unable to right now due to back pain.    Update 7/25/22:  Returns for f/u of CKD.  Baseline sCr: 2.5-2.7 mg/dL.  Started chlorthalidone last visit.    Home BPs: 130s/80s   Still smoking, but has decreased.  Started exercising again recently.    Denies NSAIDs. Denies hospitalizations.    Update 10/28/22:  Returns for f/u of CKD.  Baseline sCr: 2.5-2.7 mg/dL.  Home BPs: has not been taking    Quit smoking cigarettes on 8/27.  Says he is exercising. Has gym membership.    Update 1/30/23:  Returns for f/u of CKD.  Baseline sCr: 2.5-2.7 mg/dL.  Home BPs: has not been taking  Walks for exercise. Has cut out fried food. Working on weight loss.    Update 4/5/23:  Returns for f/u of CKD.  Baseline sCr: 2.5-2.7 mg/dL.  Home BPs: 150s/90s (lots of stress recently); prior to stress was 130s/90s  Still walking for exercise. Working on diet.    Update 6/26/24:  Returns for f/u of CKD.  Baseline sCr: 2.5-2.7 mg/dL. No recent labs.  Takes tylenol for pain.    Update 11/6/24:  Returns for f/u of CKD.  Baseline sCr: 2.5-2.7 mg/dL.  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  Has lost 20lb through diet and exercise.    Update 2/11/25:  Notes:   Returns for f/u of CKD.  Baseline sCr: 2.5-2.7 mg/dL. Most recent sCr was 2.4 mg/dL.  Home BPs: 139/77 before medication  Has lost a total of 25 lbs through diet and exercise.  Back pain improved with weight loss.    Update 7/8/25:  Returns for f/u of  CKD.  Baseline sCr: 2.5-2.7 mg/dL. Most recent sCr was 2.5 mg/dL.  Home BPs: 130s/80s before medicine  Has lost a total of 30 lbs through diet and exercise.    Review of Systems   Respiratory: Negative for shortness of breath.    Cardiovascular: Denies swelling in legs  Gastrointestinal: Negative for diarrhea, nausea and vomiting.   Genitourinary: Negative for difficulty urinating, dysuria, hematuria.         Objective:       There were no vitals taken for this visit.  Physical Exam  Vitals reviewed.   Constitutional:       General: He is not in acute distress.     Appearance: Normal appearance. He is well-developed. He is obese. He is not ill-appearing or diaphoretic.     Pulmonary:      Effort: Pulmonary effort is normal. No respiratory distress.       Neurological:      Mental Status: He is alert and oriented to person, place, and time.   Psychiatric:         Mood and Affect: Mood normal.         Behavior: Behavior normal.         Thought Content: Thought content normal.         Judgment: Judgment normal.           Lab Results   Component Value Date    CREATININE 2.5 (H) 06/10/2025    URICACID 6.3 09/05/2023     Urine Protein/Creatinine Ratio   Date Value Ref Range Status   06/10/2025 0.05 <=0.20 Final     Prot/Creat Ratio, Urine   Date Value Ref Range Status   02/04/2025 0.10 0.00 - 0.20 Final   10/18/2024 0.08 0.00 - 0.20 Final   07/10/2024 0.11 0.00 - 0.20 Final     Lab Results   Component Value Date     06/10/2025    K 4.1 06/10/2025    CO2 25 06/10/2025     06/10/2025     Lab Results   Component Value Date    .7 (H) 06/10/2025    CALCIUM 9.3 06/10/2025    PHOS 3.0 06/10/2025     Lab Results   Component Value Date    HGB 13.8 (L) 06/10/2025    WBC 7.37 06/10/2025    HCT 41.0 06/10/2025      Lab Results   Component Value Date    HGBA1C 4.9 02/15/2022     06/10/2025    BUN 25 (H) 06/10/2025     Lab Results   Component Value Date    LDLCALC 124.2 03/31/2023         Assessment:       1.  Hypertension, unspecified type    2. Chronic kidney disease (CKD), stage IV (severe)    3. Morbid obesity    4. Secondary hyperparathyroidism    5. Proteinuria, unspecified type                      Plan:   CKD stage 4 - clinically 2/2 longstanding poorly controlled HTN +/- obesity. Stable. Moderate risk of ESRD in the next 5 years.        He has quit smoking cigarettes.  He has lost 30 lbs through diet and exercise.    UPCR Albuminuria controlled on olmesartan. Monitor.      Acid-base WNL   Renal osteodystrophy Ca, phos okay. PTH elevated. Vit D okay. On Vitamin D 2000 daily OTC.   Anemia Hgb at goal   DM N/a   Lipid Management On statin now. Recommend avoiding fenofibrate.   ESRD planning Defer    Kidney Failure Risk Equation (Tangri)    Kidney Failure Risk at 2 years: 2%    Kidney Failure Risk at 5 years: 6.2%    Lab Results   Component Value Date    MICALBCREAT 7.7 06/10/2025    CREATININE 2.5 (H) 06/10/2025           HTN - Improving with weight loss. Well-controlled. On amlodipine 10 mg, chlorthalidone 25 mg, hydralazine 100 mg TID, olmesartan 40 mg, metoprolol succinate 50 mg   - now off Hctz; says back pain has improved off it; also off spironolactone 2/2 breast tenderness  - Thinks he had side effects with nifedipine previously  - Monitor BP before and after medications a few times a week. Write down results. Bring to appointments. Consider eplerenone in future.    Morbid obesity -  Applauded his weight loss efforts. His goal is to be able to ride the the rides at [employer] with his kids next Fall 2025. Briefly discussed adding a GLP1a, but he is not interested at present.    All questions patient had were answered.  Asked if further questions. None. F/u in clinic in 4 mos with labs and urine prior to next visit or sooner if needed.  ER for emergency concerns.     Summary of Plan:  Monitor home BPs. Bring results to appts  Consider eplerenone vs. SGLT2i at next visit  avoid NSAID/ bactrim/ IV contrast/  gadolinium/ aminoglycoside where possible  Continue weight loss  RTC in 4 mos.       Visit today included increased complexity associated with managing the longitudinal care of the patient due to the serious and/or complex managed problem(s) CKD.

## 2025-07-10 ENCOUNTER — PATIENT MESSAGE (OUTPATIENT)
Dept: NEPHROLOGY | Facility: CLINIC | Age: 60
End: 2025-07-10
Payer: MEDICARE

## 2025-08-07 ENCOUNTER — PATIENT MESSAGE (OUTPATIENT)
Dept: NEPHROLOGY | Facility: CLINIC | Age: 60
End: 2025-08-07
Payer: MEDICARE

## 2025-08-20 ENCOUNTER — PATIENT MESSAGE (OUTPATIENT)
Dept: NEPHROLOGY | Facility: CLINIC | Age: 60
End: 2025-08-20
Payer: MEDICARE

## (undated) DEVICE — DISSECTOR SPACEMAKER + 10-12MM

## (undated) DEVICE — NDL HYPO REG 25G X 1 1/2

## (undated) DEVICE — SUT 0 VICRYL / UR6 (J603)

## (undated) DEVICE — GAUZE SPONGE 4X4 12PLY

## (undated) DEVICE — NDL 18GA X1 1/2 REG BEVEL

## (undated) DEVICE — SUT MCRYL PLUS 4-0 PS2 27IN

## (undated) DEVICE — SEE MEDLINE ITEM 157117

## (undated) DEVICE — SOL PVP-I SCRUB 7.5% 4OZ

## (undated) DEVICE — BLADE SURG CARBON STEEL SZ11

## (undated) DEVICE — TUBING HF INSUFFLATION W/ FLTR

## (undated) DEVICE — DRESSING N ADH OIL EMUL 3X3

## (undated) DEVICE — ADHESIVE MASTISOL VIAL 48/BX

## (undated) DEVICE — SEE MEDLINE ITEM 157148

## (undated) DEVICE — COVER LIGHT HANDLE 80/CA

## (undated) DEVICE — Device

## (undated) DEVICE — TOURNIQUET SB QC DP 18X4IN

## (undated) DEVICE — GLOVE BIOGEL ECLIPSE SZ 7

## (undated) DEVICE — COVER CAMERA OPERATING ROOM

## (undated) DEVICE — SOL IRR SOD CHL .9% POUR

## (undated) DEVICE — SYR B-D DISP CONTROL 10CC100/C

## (undated) DEVICE — BANDAGE MATRIX HK LOOP 2IN 5YD

## (undated) DEVICE — TRAY MINOR GEN SURG

## (undated) DEVICE — STRAP SECURE 5MM

## (undated) DEVICE — ELECTRODE REM PLYHSV RETURN 9

## (undated) DEVICE — DRAPE STERI-DRAPE 1000 17X11IN

## (undated) DEVICE — DRAPE CORETEMP FLD WRM 56X62IN

## (undated) DEVICE — GLOVE BIOGEL SKINSENSE PI 7.0

## (undated) DEVICE — CLOSURE SKIN STERI STRIP 1/2X4

## (undated) DEVICE — SUT 4/0 18IN ETHILON BL P3

## (undated) DEVICE — NDL 22GA X1 1/2 REG BEVEL

## (undated) DEVICE — SOL NS 1000CC

## (undated) DEVICE — TRAY FOLEY 16FR INFECTION CONT